# Patient Record
Sex: FEMALE | Race: WHITE | Employment: FULL TIME | URBAN - METROPOLITAN AREA
[De-identification: names, ages, dates, MRNs, and addresses within clinical notes are randomized per-mention and may not be internally consistent; named-entity substitution may affect disease eponyms.]

---

## 2017-01-18 ENCOUNTER — OFFICE VISIT (OUTPATIENT)
Dept: FAMILY MEDICINE CLINIC | Age: 62
End: 2017-01-18

## 2017-01-18 VITALS
BODY MASS INDEX: 34.49 KG/M2 | WEIGHT: 202 LBS | DIASTOLIC BLOOD PRESSURE: 72 MMHG | SYSTOLIC BLOOD PRESSURE: 152 MMHG | HEIGHT: 64 IN

## 2017-01-18 DIAGNOSIS — N18.9 CRI (CHRONIC RENAL INSUFFICIENCY), UNSPECIFIED STAGE: ICD-10-CM

## 2017-01-18 DIAGNOSIS — E78.5 HYPERLIPIDEMIA, UNSPECIFIED HYPERLIPIDEMIA TYPE: ICD-10-CM

## 2017-01-18 DIAGNOSIS — E11.21 TYPE 2 DIABETES MELLITUS WITH DIABETIC NEPHROPATHY, WITH LONG-TERM CURRENT USE OF INSULIN (HCC): ICD-10-CM

## 2017-01-18 DIAGNOSIS — I10 ESSENTIAL HYPERTENSION: Primary | ICD-10-CM

## 2017-01-18 DIAGNOSIS — Z79.4 TYPE 2 DIABETES MELLITUS WITH DIABETIC NEPHROPATHY, WITH LONG-TERM CURRENT USE OF INSULIN (HCC): ICD-10-CM

## 2017-01-18 PROCEDURE — 99214 OFFICE O/P EST MOD 30 MIN: CPT | Performed by: FAMILY MEDICINE

## 2017-01-18 RX ORDER — CARVEDILOL 12.5 MG/1
12.5 TABLET ORAL 2 TIMES DAILY
Qty: 180 TABLET | Refills: 1 | Status: SHIPPED | OUTPATIENT
Start: 2017-01-18 | End: 2017-07-24 | Stop reason: SDUPTHER

## 2017-01-19 RX ORDER — HYDROCHLOROTHIAZIDE 12.5 MG/1
CAPSULE, GELATIN COATED ORAL
Qty: 180 CAPSULE | Refills: 0 | Status: SHIPPED | OUTPATIENT
Start: 2017-01-19 | End: 2017-06-12 | Stop reason: SDUPTHER

## 2017-03-06 ENCOUNTER — OFFICE VISIT (OUTPATIENT)
Dept: FAMILY MEDICINE CLINIC | Age: 62
End: 2017-03-06

## 2017-03-06 VITALS
DIASTOLIC BLOOD PRESSURE: 64 MMHG | HEIGHT: 63 IN | BODY MASS INDEX: 37.39 KG/M2 | SYSTOLIC BLOOD PRESSURE: 160 MMHG | WEIGHT: 211 LBS

## 2017-03-06 DIAGNOSIS — E78.5 HYPERLIPIDEMIA, UNSPECIFIED HYPERLIPIDEMIA TYPE: ICD-10-CM

## 2017-03-06 DIAGNOSIS — E11.21 TYPE 2 DIABETES MELLITUS WITH DIABETIC NEPHROPATHY, WITH LONG-TERM CURRENT USE OF INSULIN (HCC): Primary | ICD-10-CM

## 2017-03-06 DIAGNOSIS — N18.9 CRI (CHRONIC RENAL INSUFFICIENCY), UNSPECIFIED STAGE: ICD-10-CM

## 2017-03-06 DIAGNOSIS — Z79.4 TYPE 2 DIABETES MELLITUS WITH DIABETIC NEPHROPATHY, WITH LONG-TERM CURRENT USE OF INSULIN (HCC): Primary | ICD-10-CM

## 2017-03-06 DIAGNOSIS — I10 ESSENTIAL HYPERTENSION: ICD-10-CM

## 2017-03-06 LAB
ANION GAP SERPL CALCULATED.3IONS-SCNC: 12 MMOL/L (ref 3–16)
BUN BLDV-MCNC: 24 MG/DL (ref 7–20)
CALCIUM SERPL-MCNC: 9 MG/DL (ref 8.3–10.6)
CHLORIDE BLD-SCNC: 105 MMOL/L (ref 99–110)
CHOLESTEROL, TOTAL: 125 MG/DL (ref 0–199)
CO2: 27 MMOL/L (ref 21–32)
CREAT SERPL-MCNC: 1.1 MG/DL (ref 0.6–1.2)
GFR AFRICAN AMERICAN: >60
GFR NON-AFRICAN AMERICAN: 50
GLUCOSE BLD-MCNC: 104 MG/DL (ref 70–99)
HDLC SERPL-MCNC: 53 MG/DL (ref 40–60)
LDL CHOLESTEROL CALCULATED: 50 MG/DL
POTASSIUM SERPL-SCNC: 4.9 MMOL/L (ref 3.5–5.1)
SODIUM BLD-SCNC: 144 MMOL/L (ref 136–145)
TRIGL SERPL-MCNC: 112 MG/DL (ref 0–150)
VLDLC SERPL CALC-MCNC: 22 MG/DL

## 2017-03-06 PROCEDURE — 36415 COLL VENOUS BLD VENIPUNCTURE: CPT | Performed by: FAMILY MEDICINE

## 2017-03-06 PROCEDURE — 99214 OFFICE O/P EST MOD 30 MIN: CPT | Performed by: FAMILY MEDICINE

## 2017-03-06 RX ORDER — VALSARTAN 160 MG/1
160 TABLET ORAL DAILY
COMMUNITY
End: 2017-08-18 | Stop reason: SDUPTHER

## 2017-03-06 RX ORDER — VALSARTAN 80 MG/1
80 TABLET ORAL DAILY
COMMUNITY
End: 2017-03-06

## 2017-03-07 LAB
ESTIMATED AVERAGE GLUCOSE: 128.4 MG/DL
HBA1C MFR BLD: 6.1 %

## 2017-04-24 RX ORDER — HYDROCHLOROTHIAZIDE 12.5 MG/1
CAPSULE, GELATIN COATED ORAL
Qty: 180 CAPSULE | Refills: 0 | Status: SHIPPED | OUTPATIENT
Start: 2017-04-24 | End: 2017-06-12 | Stop reason: SDUPTHER

## 2017-06-12 ENCOUNTER — OFFICE VISIT (OUTPATIENT)
Dept: FAMILY MEDICINE CLINIC | Age: 62
End: 2017-06-12

## 2017-06-12 VITALS
BODY MASS INDEX: 36.71 KG/M2 | DIASTOLIC BLOOD PRESSURE: 66 MMHG | HEIGHT: 63 IN | OXYGEN SATURATION: 98 % | HEART RATE: 66 BPM | SYSTOLIC BLOOD PRESSURE: 154 MMHG | WEIGHT: 207.2 LBS

## 2017-06-12 DIAGNOSIS — E11.21 TYPE 2 DIABETES MELLITUS WITH DIABETIC NEPHROPATHY, WITH LONG-TERM CURRENT USE OF INSULIN (HCC): Primary | ICD-10-CM

## 2017-06-12 DIAGNOSIS — E78.5 HYPERLIPIDEMIA, UNSPECIFIED HYPERLIPIDEMIA TYPE: ICD-10-CM

## 2017-06-12 DIAGNOSIS — Z79.4 TYPE 2 DIABETES MELLITUS WITH DIABETIC NEPHROPATHY, WITH LONG-TERM CURRENT USE OF INSULIN (HCC): Primary | ICD-10-CM

## 2017-06-12 DIAGNOSIS — N18.9 CRI (CHRONIC RENAL INSUFFICIENCY), UNSPECIFIED STAGE: ICD-10-CM

## 2017-06-12 DIAGNOSIS — I10 ESSENTIAL HYPERTENSION: ICD-10-CM

## 2017-06-12 LAB
CREATININE URINE: 152.2 MG/DL (ref 28–259)
HBA1C MFR BLD: 6.5 %
MICROALBUMIN UR-MCNC: 3 MG/DL
MICROALBUMIN/CREAT UR-RTO: 19.7 MG/G (ref 0–30)

## 2017-06-12 PROCEDURE — 99214 OFFICE O/P EST MOD 30 MIN: CPT | Performed by: FAMILY MEDICINE

## 2017-06-12 PROCEDURE — 83036 HEMOGLOBIN GLYCOSYLATED A1C: CPT | Performed by: FAMILY MEDICINE

## 2017-06-26 RX ORDER — PIOGLITAZONEHYDROCHLORIDE 45 MG/1
TABLET ORAL
Qty: 30 TABLET | Refills: 5 | OUTPATIENT
Start: 2017-06-26

## 2017-06-28 RX ORDER — PIOGLITAZONEHYDROCHLORIDE 45 MG/1
45 TABLET ORAL DAILY
Qty: 30 TABLET | Refills: 3 | Status: CANCELLED | OUTPATIENT
Start: 2017-06-28

## 2017-07-24 RX ORDER — CARVEDILOL 12.5 MG/1
12.5 TABLET ORAL 2 TIMES DAILY
Qty: 180 TABLET | Refills: 1 | Status: SHIPPED | OUTPATIENT
Start: 2017-07-24 | End: 2017-09-12 | Stop reason: SDUPTHER

## 2017-07-24 RX ORDER — HYDROCHLOROTHIAZIDE 12.5 MG/1
CAPSULE, GELATIN COATED ORAL
Qty: 180 CAPSULE | Refills: 0 | Status: SHIPPED | OUTPATIENT
Start: 2017-07-24 | End: 2017-09-12 | Stop reason: DRUGHIGH

## 2017-08-18 ENCOUNTER — OFFICE VISIT (OUTPATIENT)
Dept: CARDIOLOGY CLINIC | Age: 62
End: 2017-08-18

## 2017-08-18 VITALS
BODY MASS INDEX: 35.08 KG/M2 | HEART RATE: 64 BPM | DIASTOLIC BLOOD PRESSURE: 90 MMHG | WEIGHT: 198 LBS | SYSTOLIC BLOOD PRESSURE: 180 MMHG | OXYGEN SATURATION: 96 % | HEIGHT: 63 IN

## 2017-08-18 DIAGNOSIS — Z79.4 TYPE 2 DIABETES MELLITUS WITH DIABETIC NEPHROPATHY, WITH LONG-TERM CURRENT USE OF INSULIN (HCC): ICD-10-CM

## 2017-08-18 DIAGNOSIS — I10 ESSENTIAL HYPERTENSION: Primary | ICD-10-CM

## 2017-08-18 DIAGNOSIS — E11.21 TYPE 2 DIABETES MELLITUS WITH DIABETIC NEPHROPATHY, WITH LONG-TERM CURRENT USE OF INSULIN (HCC): ICD-10-CM

## 2017-08-18 DIAGNOSIS — E78.2 MIXED HYPERLIPIDEMIA: ICD-10-CM

## 2017-08-18 DIAGNOSIS — N18.9 CRI (CHRONIC RENAL INSUFFICIENCY), UNSPECIFIED STAGE: ICD-10-CM

## 2017-08-18 PROCEDURE — 93000 ELECTROCARDIOGRAM COMPLETE: CPT | Performed by: INTERNAL MEDICINE

## 2017-08-18 PROCEDURE — 99204 OFFICE O/P NEW MOD 45 MIN: CPT | Performed by: INTERNAL MEDICINE

## 2017-08-18 RX ORDER — HYDROCHLOROTHIAZIDE 25 MG/1
25 TABLET ORAL DAILY
Qty: 30 TABLET | Refills: 5 | Status: SHIPPED | OUTPATIENT
Start: 2017-08-18 | End: 2018-02-08 | Stop reason: SDUPTHER

## 2017-08-18 RX ORDER — VALSARTAN 320 MG/1
320 TABLET ORAL DAILY
Qty: 30 TABLET | Refills: 11 | Status: SHIPPED | OUTPATIENT
Start: 2017-08-18 | End: 2018-08-07 | Stop reason: ALTCHOICE

## 2017-08-18 RX ORDER — AMLODIPINE BESYLATE 5 MG/1
5 TABLET ORAL DAILY
Qty: 30 TABLET | Refills: 5 | Status: SHIPPED | OUTPATIENT
Start: 2017-08-18 | End: 2018-02-08 | Stop reason: SDUPTHER

## 2017-09-06 ENCOUNTER — TELEPHONE (OUTPATIENT)
Dept: CARDIOLOGY CLINIC | Age: 62
End: 2017-09-06

## 2017-09-07 ENCOUNTER — HOSPITAL ENCOUNTER (OUTPATIENT)
Dept: CARDIOLOGY | Facility: CLINIC | Age: 62
Discharge: HOME | End: 2017-09-07
Attending: INTERNAL MEDICINE | Admitting: INTERNAL MEDICINE

## 2017-09-07 LAB
LV EF: 55 %
LVEF MODALITY: NORMAL

## 2017-09-08 ENCOUNTER — TELEPHONE (OUTPATIENT)
Dept: CARDIOLOGY CLINIC | Age: 62
End: 2017-09-08

## 2017-09-12 ENCOUNTER — OFFICE VISIT (OUTPATIENT)
Dept: CARDIOLOGY CLINIC | Age: 62
End: 2017-09-12

## 2017-09-12 ENCOUNTER — HOSPITAL ENCOUNTER (OUTPATIENT)
Dept: VASCULAR LAB | Age: 62
Discharge: HOME | End: 2017-09-12
Attending: INTERNAL MEDICINE | Admitting: INTERNAL MEDICINE

## 2017-09-12 ENCOUNTER — TELEPHONE (OUTPATIENT)
Dept: CARDIOLOGY CLINIC | Age: 62
End: 2017-09-12

## 2017-09-12 VITALS
OXYGEN SATURATION: 96 % | SYSTOLIC BLOOD PRESSURE: 128 MMHG | WEIGHT: 197.5 LBS | HEIGHT: 62 IN | BODY MASS INDEX: 36.34 KG/M2 | HEART RATE: 72 BPM | DIASTOLIC BLOOD PRESSURE: 64 MMHG

## 2017-09-12 DIAGNOSIS — I10 ESSENTIAL (PRIMARY) HYPERTENSION: ICD-10-CM

## 2017-09-12 DIAGNOSIS — N18.9 CRI (CHRONIC RENAL INSUFFICIENCY), UNSPECIFIED STAGE: ICD-10-CM

## 2017-09-12 DIAGNOSIS — Z79.4 TYPE 2 DIABETES MELLITUS WITH DIABETIC NEPHROPATHY, WITH LONG-TERM CURRENT USE OF INSULIN (HCC): ICD-10-CM

## 2017-09-12 DIAGNOSIS — E78.2 MIXED HYPERLIPIDEMIA: ICD-10-CM

## 2017-09-12 DIAGNOSIS — I10 ESSENTIAL HYPERTENSION: Primary | ICD-10-CM

## 2017-09-12 DIAGNOSIS — E11.21 TYPE 2 DIABETES MELLITUS WITH DIABETIC NEPHROPATHY, WITH LONG-TERM CURRENT USE OF INSULIN (HCC): ICD-10-CM

## 2017-09-12 PROCEDURE — 99214 OFFICE O/P EST MOD 30 MIN: CPT | Performed by: NURSE PRACTITIONER

## 2017-09-12 RX ORDER — CARVEDILOL 25 MG/1
25 TABLET ORAL 2 TIMES DAILY
Qty: 60 TABLET | Refills: 5 | Status: SHIPPED | OUTPATIENT
Start: 2017-09-12 | End: 2018-02-08 | Stop reason: SDUPTHER

## 2017-09-13 ENCOUNTER — OFFICE VISIT (OUTPATIENT)
Dept: FAMILY MEDICINE CLINIC | Age: 62
End: 2017-09-13

## 2017-09-13 VITALS
DIASTOLIC BLOOD PRESSURE: 64 MMHG | OXYGEN SATURATION: 97 % | SYSTOLIC BLOOD PRESSURE: 148 MMHG | HEIGHT: 62 IN | HEART RATE: 83 BPM | BODY MASS INDEX: 36.25 KG/M2 | WEIGHT: 197 LBS

## 2017-09-13 DIAGNOSIS — Z79.4 TYPE 2 DIABETES MELLITUS WITH DIABETIC NEPHROPATHY, WITH LONG-TERM CURRENT USE OF INSULIN (HCC): Primary | ICD-10-CM

## 2017-09-13 DIAGNOSIS — E78.2 MIXED HYPERLIPIDEMIA: ICD-10-CM

## 2017-09-13 DIAGNOSIS — I10 ESSENTIAL HYPERTENSION: ICD-10-CM

## 2017-09-13 DIAGNOSIS — E11.21 TYPE 2 DIABETES MELLITUS WITH DIABETIC NEPHROPATHY, WITH LONG-TERM CURRENT USE OF INSULIN (HCC): Primary | ICD-10-CM

## 2017-09-13 DIAGNOSIS — N18.9 CRI (CHRONIC RENAL INSUFFICIENCY), UNSPECIFIED STAGE: ICD-10-CM

## 2017-09-13 DIAGNOSIS — Z23 FLU VACCINE NEED: ICD-10-CM

## 2017-09-13 LAB
A/G RATIO: 1.7 (ref 1.1–2.2)
ALBUMIN SERPL-MCNC: 4.2 G/DL (ref 3.4–5)
ALP BLD-CCNC: 56 U/L (ref 40–129)
ALT SERPL-CCNC: 28 U/L (ref 10–40)
ANION GAP SERPL CALCULATED.3IONS-SCNC: 14 MMOL/L (ref 3–16)
AST SERPL-CCNC: 26 U/L (ref 15–37)
BILIRUB SERPL-MCNC: <0.2 MG/DL (ref 0–1)
BUN BLDV-MCNC: 24 MG/DL (ref 7–20)
CALCIUM SERPL-MCNC: 9.3 MG/DL (ref 8.3–10.6)
CHLORIDE BLD-SCNC: 102 MMOL/L (ref 99–110)
CHOLESTEROL, TOTAL: 138 MG/DL (ref 0–199)
CO2: 27 MMOL/L (ref 21–32)
CREAT SERPL-MCNC: 1 MG/DL (ref 0.6–1.2)
GFR AFRICAN AMERICAN: >60
GFR NON-AFRICAN AMERICAN: 56
GLOBULIN: 2.5 G/DL
GLUCOSE BLD-MCNC: 158 MG/DL (ref 70–99)
HDLC SERPL-MCNC: 54 MG/DL (ref 40–60)
LDL CHOLESTEROL CALCULATED: 57 MG/DL
POTASSIUM SERPL-SCNC: 5.1 MMOL/L (ref 3.5–5.1)
SODIUM BLD-SCNC: 143 MMOL/L (ref 136–145)
TOTAL PROTEIN: 6.7 G/DL (ref 6.4–8.2)
TRIGL SERPL-MCNC: 136 MG/DL (ref 0–150)
VLDLC SERPL CALC-MCNC: 27 MG/DL

## 2017-09-13 PROCEDURE — 90686 IIV4 VACC NO PRSV 0.5 ML IM: CPT | Performed by: FAMILY MEDICINE

## 2017-09-13 PROCEDURE — 36415 COLL VENOUS BLD VENIPUNCTURE: CPT | Performed by: FAMILY MEDICINE

## 2017-09-13 PROCEDURE — 99214 OFFICE O/P EST MOD 30 MIN: CPT | Performed by: FAMILY MEDICINE

## 2017-09-13 PROCEDURE — 90471 IMMUNIZATION ADMIN: CPT | Performed by: FAMILY MEDICINE

## 2017-09-13 RX ORDER — COVID-19 ANTIGEN TEST
440 KIT MISCELLANEOUS DAILY
COMMUNITY
End: 2019-07-24

## 2017-09-13 ASSESSMENT — PATIENT HEALTH QUESTIONNAIRE - PHQ9
1. LITTLE INTEREST OR PLEASURE IN DOING THINGS: 0
2. FEELING DOWN, DEPRESSED OR HOPELESS: 0
SUM OF ALL RESPONSES TO PHQ QUESTIONS 1-9: 0
SUM OF ALL RESPONSES TO PHQ9 QUESTIONS 1 & 2: 0

## 2017-09-15 ENCOUNTER — TELEPHONE (OUTPATIENT)
Dept: FAMILY MEDICINE CLINIC | Age: 62
End: 2017-09-15

## 2017-10-24 ENCOUNTER — OFFICE VISIT (OUTPATIENT)
Dept: CARDIOLOGY CLINIC | Age: 62
End: 2017-10-24

## 2017-10-24 VITALS
HEIGHT: 62 IN | BODY MASS INDEX: 37.26 KG/M2 | DIASTOLIC BLOOD PRESSURE: 56 MMHG | HEART RATE: 70 BPM | OXYGEN SATURATION: 96 % | WEIGHT: 202.5 LBS | SYSTOLIC BLOOD PRESSURE: 138 MMHG

## 2017-10-24 DIAGNOSIS — E11.21 TYPE 2 DIABETES MELLITUS WITH DIABETIC NEPHROPATHY, WITH LONG-TERM CURRENT USE OF INSULIN (HCC): ICD-10-CM

## 2017-10-24 DIAGNOSIS — Z79.4 TYPE 2 DIABETES MELLITUS WITH DIABETIC NEPHROPATHY, WITH LONG-TERM CURRENT USE OF INSULIN (HCC): ICD-10-CM

## 2017-10-24 DIAGNOSIS — N18.9 CRI (CHRONIC RENAL INSUFFICIENCY), UNSPECIFIED STAGE: ICD-10-CM

## 2017-10-24 DIAGNOSIS — E78.2 MIXED HYPERLIPIDEMIA: ICD-10-CM

## 2017-10-24 DIAGNOSIS — I10 ESSENTIAL HYPERTENSION: Primary | ICD-10-CM

## 2017-10-24 PROCEDURE — 99213 OFFICE O/P EST LOW 20 MIN: CPT | Performed by: NURSE PRACTITIONER

## 2017-10-24 NOTE — PROGRESS NOTES
[facility]   Cardiac Evaluation    Primary Care Doctor: Ricky Romano MD    Chief Complaint   Patient presents with    Hypertension     follow up        History of Present Illness:   I had the pleasure of seeing Jenn Feng in follow up for HTN, HLD and DM. At last visit we increased the Coreg to 25 mg bid. She has tolerated this well. She had blood work last month with stable renal function. Her BP readings at home range 127/60 to 167/ 80, mostly in the 140/70's. She continues to exercise regularly and run around with grandkids. Her exercise tolerance is unchanged, good. She follows a no added salt diet. Jenn Feng describes symptoms including edema but denies chest pain, dyspnea, palpitations, orthopnea, PND, fatigue, early saiety, syncope. NYHA:   II  ACC/ AHA Stage:    B    Past Medical History:   has a past medical history of Asthma; Chronic kidney disease; Hyperlipidemia; Hypertension; and Type II or unspecified type diabetes mellitus without mention of complication, not stated as uncontrolled. Surgical History:   has a past surgical history that includes Spine surgery (1995) and Hysterectomy. Social History:   reports that she has never smoked. She has never used smokeless tobacco. She reports that she does not drink alcohol or use drugs. Family History:   Family History   Problem Relation Age of Onset    Diabetes Father        Home Medications:  Prior to Admission medications    Medication Sig Start Date End Date Taking?  Authorizing Provider   Naproxen Sodium (ALEVE) 220 MG CAPS Take 440 mg by mouth daily   Yes Historical Provider, MD   carvedilol (COREG) 25 MG tablet Take 1 tablet by mouth 2 times daily 9/12/17  Yes Lena Flavors, NP   CINNAMON PO Take by mouth   Yes Historical Provider, MD   valsartan (DIOVAN) 320 MG tablet Take 1 tablet by mouth daily 8/18/17  Yes Carlos Manuel Herrera MD   hydrochlorothiazide (HYDRODIURIL) 25 MG tablet Take 1 tablet by mouth daily 8/18/17  Yes Jamal Ormond, MD   amLODIPine (NORVASC) 5 MG tablet Take 1 tablet by mouth daily 8/18/17  Yes Jamal Ormond, MD   simvastatin (ZOCOR) 40 MG tablet TAKE 1 TABLET BY MOUTH EVERY EVENING 5/24/17  Yes Brendan Willett MD   metFORMIN (GLUCOPHAGE) 1000 MG tablet TAKE 1 TABLET BY MOUTH TWICE DAILY WITH MEALS 11/17/16  Yes Brendan Willett MD   Insulin Degludec (TRESIBA FLEXTOUCH) 200 UNIT/ML SOPN Inject 10 Units into the skin daily  Patient taking differently: Inject 16 Units into the skin daily  11/17/16  Yes Brendan Willett MD        Allergies:  Norvasc [amlodipine besylate]; Proventil [albuterol sulfate]; and Tetanus toxoids     Review of Systems:   · Constitutional: there has been no unanticipated weight loss. There's been no change in energy level, sleep pattern, or activity level. · Eyes: No visual changes or diplopia. No scleral icterus. · ENT: No Headaches, hearing loss or vertigo. No mouth sores or sore throat. · Cardiovascular: Reviewed in HPI  · Respiratory: No cough or wheezing, no sputum production. No hematemesis. · Gastrointestinal: No abdominal pain, appetite loss, blood in stools. No change in bowel or bladder habits. · Genitourinary: No dysuria, trouble voiding, or hematuria. · Musculoskeletal:  No gait disturbance, weakness or joint complaints. · Integumentary: No rash or pruritis. · Neurological: No headache, diplopia, change in muscle strength, numbness or tingling. No change in gait, balance, coordination, mood, affect, memory, mentation, behavior. · Psychiatric: No anxiety, no depression. · Endocrine: No malaise, fatigue or temperature intolerance. No excessive thirst, fluid intake, or urination. No tremor. · Hematologic/Lymphatic: No abnormal bruising or bleeding, blood clots or swollen lymph nodes. · Allergic/Immunologic: No nasal congestion or hives.     Physical Examination:    Vitals:    10/24/17 1532 10/24/17 1536   BP: (!) 152/60 (!) 138/56   Pulse: 70 SpO2: 96%    Weight: 202 lb 8 oz (91.9 kg)    Height: 5' 2\" (1.575 m)         Constitutional and General Appearance: Warm and dry, no apparent distress, normal coloration  HEENT:  Normocephalic, atraumatic  Respiratory:  · Normal excursion and expansion without use of accessory muscles  · Resp Auscultation: Normal breath sounds without dullness  Cardiovascular:  · The apical impulses not displaced  · Heart tones are crisp and normal  · JVP 8 cm H2O  · Regular rate and rhythm, normal S1S2, no m/g/r/c  · Peripheral pulses are symmetrical and full  · There is no clubbing, cyanosis of the extremities. · 1+ edema  · Pedal Pulses: 2+ and equal   Abdomen:  · No masses or tenderness  · Liver/Spleen: No Abnormalities Noted  Neurological/Psychiatric:  · Alert and oriented in all spheres  · Moves all extremities well  · Exhibits normal gait balance and coordination  · No abnormalities of mood, affect, memory, mentation, or behavior are noted    Lab Data:    CBC: No results found for: WBC, RBC, HGB, HCT, MCV, RDW, PLT  BMP:  Lab Results   Component Value Date     09/13/2017     03/06/2017     12/02/2016    K 5.1 09/13/2017    K 4.9 03/06/2017    K 4.6 12/02/2016     09/13/2017     03/06/2017     12/02/2016    CO2 27 09/13/2017    CO2 27 03/06/2017    CO2 28 12/02/2016    BUN 24 09/13/2017    BUN 24 03/06/2017    BUN 25 12/02/2016    CREATININE 1.0 09/13/2017    CREATININE 1.1 03/06/2017    CREATININE 1.1 12/02/2016     BNP: No results found for: PROBNP     Cardiac Imaging:  Echo 9/7/17:    Normal left ventricle systolic function with an estimated ejection fraction of 55%.   No regional wall motion abnormalities are seen.   Normal left ventricular diastolic filling pressure.   No significant valvular heart disease.   Systolic pulmonary artery pressure (SPAP) is normal and estimated at 24 mmHg (RA pressure 3 mmHg).     Renal artery ultrasound 9/12/17:    1. Technically limited exam due to

## 2017-11-21 NOTE — TELEPHONE ENCOUNTER
Robert Jacobsen is requesting refill(s)   Last OV 9-13-17 (pertaining to medication)  LR 11-17-16 (per medication requested)  Next office visit scheduled or attempted Yes   If no, reason:  12-20-17

## 2017-12-20 ENCOUNTER — OFFICE VISIT (OUTPATIENT)
Dept: FAMILY MEDICINE CLINIC | Age: 62
End: 2017-12-20

## 2017-12-20 VITALS
BODY MASS INDEX: 36.25 KG/M2 | DIASTOLIC BLOOD PRESSURE: 78 MMHG | WEIGHT: 197 LBS | SYSTOLIC BLOOD PRESSURE: 142 MMHG | HEIGHT: 62 IN

## 2017-12-20 DIAGNOSIS — E78.2 MIXED HYPERLIPIDEMIA: ICD-10-CM

## 2017-12-20 DIAGNOSIS — I10 ESSENTIAL HYPERTENSION: ICD-10-CM

## 2017-12-20 DIAGNOSIS — N18.9 CHRONIC RENAL IMPAIRMENT, UNSPECIFIED CKD STAGE: ICD-10-CM

## 2017-12-20 DIAGNOSIS — Z12.11 SCREEN FOR COLON CANCER: ICD-10-CM

## 2017-12-20 DIAGNOSIS — E11.21 TYPE 2 DIABETES MELLITUS WITH DIABETIC NEPHROPATHY, WITH LONG-TERM CURRENT USE OF INSULIN (HCC): Primary | ICD-10-CM

## 2017-12-20 DIAGNOSIS — Z79.4 TYPE 2 DIABETES MELLITUS WITH DIABETIC NEPHROPATHY, WITH LONG-TERM CURRENT USE OF INSULIN (HCC): Primary | ICD-10-CM

## 2017-12-20 LAB
ANION GAP SERPL CALCULATED.3IONS-SCNC: 14 MMOL/L (ref 3–16)
BUN BLDV-MCNC: 29 MG/DL (ref 7–20)
CALCIUM SERPL-MCNC: 9.6 MG/DL (ref 8.3–10.6)
CHLORIDE BLD-SCNC: 101 MMOL/L (ref 99–110)
CO2: 27 MMOL/L (ref 21–32)
CREAT SERPL-MCNC: 1 MG/DL (ref 0.6–1.2)
GFR AFRICAN AMERICAN: >60
GFR NON-AFRICAN AMERICAN: 56
GLUCOSE BLD-MCNC: 167 MG/DL (ref 70–99)
HBA1C MFR BLD: 7.8 %
POTASSIUM SERPL-SCNC: 5.1 MMOL/L (ref 3.5–5.1)
SODIUM BLD-SCNC: 142 MMOL/L (ref 136–145)

## 2017-12-20 PROCEDURE — 83036 HEMOGLOBIN GLYCOSYLATED A1C: CPT | Performed by: FAMILY MEDICINE

## 2017-12-20 PROCEDURE — 99214 OFFICE O/P EST MOD 30 MIN: CPT | Performed by: FAMILY MEDICINE

## 2017-12-20 PROCEDURE — 36415 COLL VENOUS BLD VENIPUNCTURE: CPT | Performed by: FAMILY MEDICINE

## 2017-12-20 NOTE — PROGRESS NOTES
BP Readings from Last 2 Encounters:   10/24/17 (!) 138/56   09/13/17 (!) 148/64     Hemoglobin A1C (%)   Date Value   06/12/2017 6.5     Microalbumin, Random Urine (mg/dL)   Date Value   06/12/2017 3.00 (H)     LDL Calculated (mg/dL)   Date Value   09/13/2017 57              Tobacco use:  Patient  reports that she has never smoked. She has never used smokeless tobacco.    If Smoker - Cessation materials given? NA    Is Daily aspirin on medication list?:  No    Diabetic retinal exam done? Yes   If yes, document in Health Maintenance. Monofilament placed on counter? Yes    Shoes and socks removed? Yes    BP taken with correct size cuff? Yes   Repeated if > 130/80 No     Microalbumin performed if applicable? Yes     SUBJECTIVE:  Arleth Dooley is a 58 y.o. female who presents for evaluation ofRenal insufficiency, hypertension, Diabetes Mellitus and hyperlipidemia. She indicates that she is feeling well and denies any symptoms referable to her elevated blood pressure, diabetes or hyperlipidemia. Specifically denies chest pain, , dyspnea, orthopnea, PND,peripheral edema , or neuro sx. No anorexia, , or leg cramps noted. No episodes of hypoglycemia. Current medication regimen is as listed below. She denies any side effects of medication, and has been taking it regularly. Since patient's last visit, she has seen cardiology, they did not change her medication that visit. We updated her medication list. Her blood pressures at home have been ranging 120s to 160s over 70s. At her last visit we also talked about increasing her insulin to use 19 units. She did not seem to understand this instruction. So again had a discussion with her about increasing her insulin to 19 units, waiting for 3 days and then increase it by one unit every 3 days until her fasting sugars are closer to 102 hour postprandial sugars are closer to 140. Blood Pressure:   Blood pressures are being checked at home.   Ranges have been : see log    Diabetes: The last A1C: 7.4. Diabetic complications are: renal insufficiency. The Blood sugars range at home have been: see log. Last Eye Exam : 11/17. The last microalbumin:    Lab Results   Component Value Date    LABMICR 3.00 (H) 06/12/2017         she is not taking aspirin. History   Smoking Status    Never Smoker   Smokeless Tobacco    Never Used       Current Outpatient Prescriptions   Medication Sig Dispense Refill    Naproxen Sodium (ALEVE) 220 MG CAPS Take 440 mg by mouth daily      carvedilol (COREG) 25 MG tablet Take 1 tablet by mouth 2 times daily 60 tablet 5    CINNAMON PO Take by mouth      valsartan (DIOVAN) 320 MG tablet Take 1 tablet by mouth daily 30 tablet 11    hydrochlorothiazide (HYDRODIURIL) 25 MG tablet Take 1 tablet by mouth daily 30 tablet 5    amLODIPine (NORVASC) 5 MG tablet Take 1 tablet by mouth daily 30 tablet 5    simvastatin (ZOCOR) 40 MG tablet TAKE 1 TABLET BY MOUTH EVERY EVENING 90 tablet 3    metFORMIN (GLUCOPHAGE) 1000 MG tablet TAKE 1 TABLET BY MOUTH TWICE DAILY WITH MEALS 180 tablet 1    Insulin Degludec (TRESIBA FLEXTOUCH) 200 UNIT/ML SOPN Inject 10 Units into the skin daily (Patient taking differently: Inject 16 Units into the skin daily ) 1 Pen 5    metFORMIN (GLUCOPHAGE) 1000 MG tablet TAKE 1 TABLET BY MOUTH TWICE DAILY WITH MEALS 180 tablet 0     No current facility-administered medications for this visit. OBJECTIVE:  BP (!) 142/78   Ht 5' 2\" (1.575 m)   Wt 197 lb (89.4 kg)   BMI 36.03 kg/m²   General: NAD, non-toxic  Neck: no JVD or bruits  S1 and S2 normal, no murmurs, clicks, gallops or rubs. Regular rate and rhythm. Chest is clear; no wheezes or rales. No edema or JVD. Vascular : DP 1-2+=  Neuro: alert, no CN or motor deficits, monofilament normal BL  Psych: normal mood, thought and judgement        ASSESSMENT:   Diabetes Mellitus, today's A1C is 7.8  1.  Type 2 diabetes mellitus with diabetic nephropathy, with long-term current use of insulin (HCC)  HM DIABETES FOOT EXAM, Needs improvement again. Reviewed with the patient increasing her insulin to 19 units, then every 3 days increase it by one unit until her fasting sugars are closer to 100 and her postprandial sugars are closer to 140. She states that she understands these instructions    2. Essential hypertension  Basic Metabolic Panel, Stable    3. Mixed hyperlipidemia  Well controlled    4. Chronic renal impairment, unspecified CKD stage  Labs pending    5. Screen for colon cancer  POCT Fecal Immunochemical Test (FIT)             Plan:  1)  Medication: continue current medication regimen As above  2)  Recheck in 3 months, sooner should new symptoms or problems arise. 3) LLR  Clemencia received counseling on the following healthy behaviors: nutrition, exercise and medication adherence        I have instructed Clemencia to complete a self tracking handout on Blood Sugars  and Blood Pressures  and instructed them to bring it with them to her next appointment. Discussed use, benefit, and side effects of prescribed medications. Barriers to medication compliance addressed. All patient questions answered. Pt voiced understanding.            Brisa Paniagua M.D.

## 2017-12-22 ENCOUNTER — HOSPITAL ENCOUNTER (OUTPATIENT)
Dept: MAMMOGRAPHY | Age: 62
Discharge: HOME | End: 2017-12-22
Attending: FAMILY MEDICINE | Admitting: FAMILY MEDICINE

## 2017-12-22 DIAGNOSIS — I10 ESSENTIAL (PRIMARY) HYPERTENSION: ICD-10-CM

## 2017-12-22 DIAGNOSIS — Z12.31 ENCOUNTER FOR SCREENING MAMMOGRAM FOR BREAST CANCER: ICD-10-CM

## 2017-12-28 LAB
CONTROL: NORMAL
HEMOCCULT STL QL: NEGATIVE

## 2017-12-28 PROCEDURE — 82274 ASSAY TEST FOR BLOOD FECAL: CPT | Performed by: FAMILY MEDICINE

## 2018-01-09 ENCOUNTER — TELEPHONE (OUTPATIENT)
Dept: FAMILY MEDICINE CLINIC | Age: 63
End: 2018-01-09

## 2018-02-08 ENCOUNTER — OFFICE VISIT (OUTPATIENT)
Dept: CARDIOLOGY CLINIC | Age: 63
End: 2018-02-08

## 2018-02-08 VITALS
OXYGEN SATURATION: 96 % | WEIGHT: 193.6 LBS | SYSTOLIC BLOOD PRESSURE: 144 MMHG | HEART RATE: 63 BPM | HEIGHT: 62 IN | DIASTOLIC BLOOD PRESSURE: 72 MMHG | BODY MASS INDEX: 35.63 KG/M2

## 2018-02-08 DIAGNOSIS — E78.2 MIXED HYPERLIPIDEMIA: ICD-10-CM

## 2018-02-08 DIAGNOSIS — N18.9 CRI (CHRONIC RENAL INSUFFICIENCY), UNSPECIFIED STAGE: ICD-10-CM

## 2018-02-08 DIAGNOSIS — I10 ESSENTIAL HYPERTENSION: Primary | ICD-10-CM

## 2018-02-08 PROCEDURE — 99213 OFFICE O/P EST LOW 20 MIN: CPT | Performed by: NURSE PRACTITIONER

## 2018-02-08 RX ORDER — CARVEDILOL 25 MG/1
25 TABLET ORAL 2 TIMES DAILY
Qty: 60 TABLET | Refills: 11 | Status: SHIPPED | OUTPATIENT
Start: 2018-02-08 | End: 2018-08-09 | Stop reason: SDUPTHER

## 2018-02-08 RX ORDER — AMLODIPINE BESYLATE 5 MG/1
5 TABLET ORAL DAILY
Qty: 30 TABLET | Refills: 11 | Status: SHIPPED | OUTPATIENT
Start: 2018-02-08 | End: 2018-10-02 | Stop reason: SDUPTHER

## 2018-02-08 RX ORDER — CALCIUM CARBONATE 300MG(750)
TABLET,CHEWABLE ORAL NIGHTLY
COMMUNITY

## 2018-02-08 RX ORDER — HYDROCHLOROTHIAZIDE 25 MG/1
25 TABLET ORAL DAILY
Qty: 30 TABLET | Refills: 11 | Status: SHIPPED | OUTPATIENT
Start: 2018-02-08 | End: 2018-10-02 | Stop reason: SDUPTHER

## 2018-02-08 RX ORDER — PHENOL 1.4 %
AEROSOL, SPRAY (ML) MUCOUS MEMBRANE DAILY
COMMUNITY

## 2018-02-08 RX ORDER — CETIRIZINE HYDROCHLORIDE 10 MG/1
10 TABLET ORAL DAILY
COMMUNITY

## 2018-02-08 NOTE — PROGRESS NOTES
[address]   Cardiac Evaluation    Primary Care Doctor: Josefa Lee MD    Chief Complaint   Patient presents with    Follow-up     no cardiac complaints        History of Present Illness:   I had the pleasure of seeing Noe Members in follow up for HTN, HLD as well as DM and CKD. Blood work from December 2017 reviewed and stable. She has been doing well. She has lost about 10 lbs. She is working fewer hours now. Appetite and sleep are good. Noe Members describes symptoms including none but denies chest pain, dyspnea, palpitations, orthopnea, PND, fatigue, early saiety, edema, syncope. NYHA:   I  ACC/ AHA Stage:    B    Past Medical History:   has a past medical history of Asthma; Chronic kidney disease; Hyperlipidemia; Hypertension; and Type II or unspecified type diabetes mellitus without mention of complication, not stated as uncontrolled. Surgical History:   has a past surgical history that includes Spine surgery (1995) and Hysterectomy. Social History:   reports that she has never smoked. She has never used smokeless tobacco. She reports that she does not drink alcohol or use drugs. Family History:   Family History   Problem Relation Age of Onset    Diabetes Father        Home Medications:  Prior to Admission medications    Medication Sig Start Date End Date Taking?  Authorizing Provider   Multiple Vitamins-Minerals (MULTIVITAMIN WOMEN) TABS Take by mouth daily   Yes Historical Provider, MD   Magnesium 400 MG TABS Take by mouth nightly   Yes Historical Provider, MD   cetirizine (ZYRTEC) 10 MG tablet Take 10 mg by mouth daily   Yes Historical Provider, MD SCHMITT FLEXTOUCH 200 UNIT/ML SOPN INJECT 10 UNITS INTO THE SKIN DAILY 1/22/18  Yes Josefa Lee MD   Naproxen Sodium (ALEVE) 220 MG CAPS Take 440 mg by mouth daily   Yes Historical Provider, MD   carvedilol (COREG) 25 MG tablet Take 1 tablet by mouth 2 times daily 9/12/17  Yes Dionte Bill NP   CINNAMON PO Take by mouth   Yes Historical Provider, MD   valsartan (DIOVAN) 320 MG tablet Take 1 tablet by mouth daily 8/18/17  Yes Tessy Acharya MD   hydrochlorothiazide (HYDRODIURIL) 25 MG tablet Take 1 tablet by mouth daily 8/18/17  Yes Tessy Acharya MD   amLODIPine (NORVASC) 5 MG tablet Take 1 tablet by mouth daily 8/18/17  Yes Tessy Acharya MD   simvastatin (ZOCOR) 40 MG tablet TAKE 1 TABLET BY MOUTH EVERY EVENING 5/24/17  Yes Norma Faith MD   metFORMIN (GLUCOPHAGE) 1000 MG tablet TAKE 1 TABLET BY MOUTH TWICE DAILY WITH MEALS 11/17/16  Yes Norma Faith MD        Allergies:  Norvasc [amlodipine besylate]; Proventil [albuterol sulfate]; and Tetanus toxoids     Review of Systems:   · Constitutional: there has been no unanticipated weight loss. There's been no change in energy level, sleep pattern, or activity level. · Eyes: No visual changes or diplopia. No scleral icterus. · ENT: No Headaches, hearing loss or vertigo. No mouth sores or sore throat. · Cardiovascular: Reviewed in HPI  · Respiratory: No cough or wheezing, no sputum production. No hematemesis. · Gastrointestinal: No abdominal pain, appetite loss, blood in stools. No change in bowel or bladder habits. · Genitourinary: No dysuria, trouble voiding, or hematuria. · Musculoskeletal:  No gait disturbance, weakness or joint complaints. · Integumentary: No rash or pruritis. · Neurological: No headache, diplopia, change in muscle strength, numbness or tingling. No change in gait, balance, coordination, mood, affect, memory, mentation, behavior. · Psychiatric: No anxiety, no depression. · Endocrine: No malaise, fatigue or temperature intolerance. No excessive thirst, fluid intake, or urination. No tremor. · Hematologic/Lymphatic: No abnormal bruising or bleeding, blood clots or swollen lymph nodes. · Allergic/Immunologic: No nasal congestion or hives.     Physical Examination:    Vitals:    02/08/18 6365 02/08/18 0820 02/08/18 0654 BP: (!) 162/70 (!) 160/70 (!) 144/72   Site:   Left Arm   Position:   Sitting   Cuff Size:   Medium Adult   Pulse: 63     SpO2: 96%     Weight: 193 lb 9.6 oz (87.8 kg)     Height: 5' 2\" (1.575 m)          Constitutional and General Appearance: Warm and dry, no apparent distress, normal coloration  HEENT:  Normocephalic, atraumatic  Respiratory:  · Normal excursion and expansion without use of accessory muscles  · Resp Auscultation: Normal breath sounds without dullness  Cardiovascular:  · The apical impulses not displaced  · Heart tones are crisp and normal  · JVP <8 cm H2O  · Regular rate and rhythm, normal S1S2, no m/g/r/c  · Peripheral pulses are symmetrical and full  · There is no clubbing, cyanosis of the extremities.   · No edema  · Pedal Pulses: 2+ and equal   Abdomen:  · No masses or tenderness  · Liver/Spleen: No Abnormalities Noted  Neurological/Psychiatric:  · Alert and oriented in all spheres  · Moves all extremities well  · Exhibits normal gait balance and coordination  · No abnormalities of mood, affect, memory, mentation, or behavior are noted    Lab Data:    CBC: No results found for: WBC, RBC, HGB, HCT, MCV, RDW, PLT  BMP:  Lab Results   Component Value Date     12/20/2017     09/13/2017     03/06/2017    K 5.1 12/20/2017    K 5.1 09/13/2017    K 4.9 03/06/2017     12/20/2017     09/13/2017     03/06/2017    CO2 27 12/20/2017    CO2 27 09/13/2017    CO2 27 03/06/2017    BUN 29 12/20/2017    BUN 24 09/13/2017    BUN 24 03/06/2017    CREATININE 1.0 12/20/2017    CREATININE 1.0 09/13/2017    CREATININE 1.1 03/06/2017     BNP: No results found for: PROBNP     Cardiac Imaging:  Echo 9/7/17:    Normal left ventricle systolic function with an estimated ejection fraction of 55%.   No regional wall motion abnormalities are seen.   Normal left ventricular diastolic filling pressure.   No significant valvular heart disease.   Systolic pulmonary artery pressure (SPAP) is

## 2018-03-21 ENCOUNTER — OFFICE VISIT (OUTPATIENT)
Dept: FAMILY MEDICINE CLINIC | Age: 63
End: 2018-03-21

## 2018-03-21 VITALS
DIASTOLIC BLOOD PRESSURE: 68 MMHG | HEART RATE: 65 BPM | WEIGHT: 196 LBS | BODY MASS INDEX: 36.07 KG/M2 | HEIGHT: 62 IN | SYSTOLIC BLOOD PRESSURE: 132 MMHG | OXYGEN SATURATION: 96 %

## 2018-03-21 DIAGNOSIS — Z79.4 TYPE 2 DIABETES MELLITUS WITH DIABETIC NEPHROPATHY, WITH LONG-TERM CURRENT USE OF INSULIN (HCC): Primary | ICD-10-CM

## 2018-03-21 DIAGNOSIS — N18.9 CHRONIC RENAL IMPAIRMENT, UNSPECIFIED CKD STAGE: ICD-10-CM

## 2018-03-21 DIAGNOSIS — E11.21 TYPE 2 DIABETES MELLITUS WITH DIABETIC NEPHROPATHY, WITH LONG-TERM CURRENT USE OF INSULIN (HCC): Primary | ICD-10-CM

## 2018-03-21 DIAGNOSIS — I10 ESSENTIAL HYPERTENSION: ICD-10-CM

## 2018-03-21 DIAGNOSIS — E78.2 MIXED HYPERLIPIDEMIA: ICD-10-CM

## 2018-03-21 LAB
A/G RATIO: 1.6 (ref 1.1–2.2)
ALBUMIN SERPL-MCNC: 4.1 G/DL (ref 3.4–5)
ALP BLD-CCNC: 66 U/L (ref 40–129)
ALT SERPL-CCNC: 70 U/L (ref 10–40)
ANION GAP SERPL CALCULATED.3IONS-SCNC: 11 MMOL/L (ref 3–16)
AST SERPL-CCNC: 53 U/L (ref 15–37)
BILIRUB SERPL-MCNC: 0.3 MG/DL (ref 0–1)
BUN BLDV-MCNC: 21 MG/DL (ref 7–20)
CALCIUM SERPL-MCNC: 9.2 MG/DL (ref 8.3–10.6)
CHLORIDE BLD-SCNC: 102 MMOL/L (ref 99–110)
CHOLESTEROL, TOTAL: 160 MG/DL (ref 0–199)
CO2: 29 MMOL/L (ref 21–32)
CREAT SERPL-MCNC: 0.9 MG/DL (ref 0.6–1.2)
GFR AFRICAN AMERICAN: >60
GFR NON-AFRICAN AMERICAN: >60
GLOBULIN: 2.5 G/DL
GLUCOSE BLD-MCNC: 197 MG/DL (ref 70–99)
HBA1C MFR BLD: 8 %
HDLC SERPL-MCNC: 59 MG/DL (ref 40–60)
LDL CHOLESTEROL CALCULATED: 55 MG/DL
POTASSIUM SERPL-SCNC: 5.2 MMOL/L (ref 3.5–5.1)
SODIUM BLD-SCNC: 142 MMOL/L (ref 136–145)
TOTAL PROTEIN: 6.6 G/DL (ref 6.4–8.2)
TRIGL SERPL-MCNC: 228 MG/DL (ref 0–150)
VLDLC SERPL CALC-MCNC: 46 MG/DL

## 2018-03-21 PROCEDURE — 99214 OFFICE O/P EST MOD 30 MIN: CPT | Performed by: FAMILY MEDICINE

## 2018-03-21 PROCEDURE — 36415 COLL VENOUS BLD VENIPUNCTURE: CPT | Performed by: FAMILY MEDICINE

## 2018-03-21 PROCEDURE — 83036 HEMOGLOBIN GLYCOSYLATED A1C: CPT | Performed by: FAMILY MEDICINE

## 2018-03-21 NOTE — PROGRESS NOTES
diabetic nephropathy, with long-term current use of insulin (HCC)  POCT glycosylated hemoglobin (Hb A1C)    dapagliflozin (FARXIGA) 5 MG tablet     DIABETES FOOT EXAM, Continue Tresiba. Hold off on increasing the dose until she gives the [place] time to show how much of a reduction she would get. We talked about possible side effects of [place] infections and urinary tract infections    2. Essential hypertension  Comprehensive Metabolic Panel, Stable    3. Chronic renal impairment, unspecified CKD stage  Labs pending    4. Mixed hyperlipidemia  Lipid Panel, Labs pending              Plan:  1)  Medication: continue current medication regimen unchanged  2)  Recheck in 3 months, sooner should new symptoms or problems arise. 3) LLR  Clemencia received counseling on the following healthy behaviors: medication adherence        I have instructed Clemencia to complete a self tracking handout on Blood Sugars  and Blood Pressures  and instructed them to bring it with them to her next appointment. Discussed use, benefit, and side effects of prescribed medications. Barriers to medication compliance addressed. All patient questions answered. Pt voiced understanding.            Jena Acosta M.D.

## 2018-06-27 ENCOUNTER — OFFICE VISIT (OUTPATIENT)
Dept: FAMILY MEDICINE CLINIC | Age: 63
End: 2018-06-27

## 2018-06-27 VITALS
DIASTOLIC BLOOD PRESSURE: 64 MMHG | WEIGHT: 190.2 LBS | HEIGHT: 62 IN | BODY MASS INDEX: 35 KG/M2 | OXYGEN SATURATION: 96 % | HEART RATE: 66 BPM | SYSTOLIC BLOOD PRESSURE: 130 MMHG

## 2018-06-27 DIAGNOSIS — I10 ESSENTIAL HYPERTENSION: ICD-10-CM

## 2018-06-27 DIAGNOSIS — Z79.4 TYPE 2 DIABETES MELLITUS WITH DIABETIC NEPHROPATHY, WITH LONG-TERM CURRENT USE OF INSULIN (HCC): Primary | ICD-10-CM

## 2018-06-27 DIAGNOSIS — E78.2 MIXED HYPERLIPIDEMIA: ICD-10-CM

## 2018-06-27 DIAGNOSIS — R22.1 LOCALIZED SWELLING, MASS AND LUMP, NECK: ICD-10-CM

## 2018-06-27 DIAGNOSIS — N18.9 CHRONIC RENAL IMPAIRMENT, UNSPECIFIED CKD STAGE: ICD-10-CM

## 2018-06-27 DIAGNOSIS — E11.21 TYPE 2 DIABETES MELLITUS WITH DIABETIC NEPHROPATHY, WITH LONG-TERM CURRENT USE OF INSULIN (HCC): Primary | ICD-10-CM

## 2018-06-27 LAB
A/G RATIO: 1.4 (ref 1.1–2.2)
ALBUMIN SERPL-MCNC: 3.8 G/DL (ref 3.4–5)
ALP BLD-CCNC: 66 U/L (ref 40–129)
ALT SERPL-CCNC: 86 U/L (ref 10–40)
ANION GAP SERPL CALCULATED.3IONS-SCNC: 13 MMOL/L (ref 3–16)
AST SERPL-CCNC: 54 U/L (ref 15–37)
BILIRUB SERPL-MCNC: <0.2 MG/DL (ref 0–1)
BUN BLDV-MCNC: 24 MG/DL (ref 7–20)
CALCIUM SERPL-MCNC: 9.2 MG/DL (ref 8.3–10.6)
CHLORIDE BLD-SCNC: 97 MMOL/L (ref 99–110)
CO2: 27 MMOL/L (ref 21–32)
CREAT SERPL-MCNC: 1.1 MG/DL (ref 0.6–1.2)
CREATININE URINE: 75.1 MG/DL (ref 28–259)
GFR AFRICAN AMERICAN: >60
GFR NON-AFRICAN AMERICAN: 50
GLOBULIN: 2.8 G/DL
GLUCOSE BLD-MCNC: 263 MG/DL (ref 70–99)
HBA1C MFR BLD: 9.3 %
MICROALBUMIN UR-MCNC: <1.2 MG/DL
MICROALBUMIN/CREAT UR-RTO: NORMAL MG/G (ref 0–30)
POTASSIUM SERPL-SCNC: 4.7 MMOL/L (ref 3.5–5.1)
SODIUM BLD-SCNC: 137 MMOL/L (ref 136–145)
TOTAL PROTEIN: 6.6 G/DL (ref 6.4–8.2)

## 2018-06-27 PROCEDURE — 99214 OFFICE O/P EST MOD 30 MIN: CPT | Performed by: FAMILY MEDICINE

## 2018-06-27 PROCEDURE — 36415 COLL VENOUS BLD VENIPUNCTURE: CPT | Performed by: FAMILY MEDICINE

## 2018-06-27 PROCEDURE — 83036 HEMOGLOBIN GLYCOSYLATED A1C: CPT | Performed by: FAMILY MEDICINE

## 2018-06-28 LAB
ESTIMATED AVERAGE GLUCOSE: 237.4 MG/DL
HBA1C MFR BLD: 9.9 %

## 2018-06-29 ENCOUNTER — HOSPITAL ENCOUNTER (OUTPATIENT)
Dept: OTHER | Age: 63
Discharge: HOME | End: 2018-06-30
Attending: FAMILY MEDICINE | Admitting: FAMILY MEDICINE

## 2018-06-29 ENCOUNTER — HOSPITAL ENCOUNTER (OUTPATIENT)
Dept: GENERAL RADIOLOGY | Age: 63
Discharge: HOME | End: 2018-06-29

## 2018-06-29 ENCOUNTER — OFFICE VISIT (OUTPATIENT)
Dept: FAMILY MEDICINE CLINIC | Age: 63
End: 2018-06-29

## 2018-06-29 VITALS
SYSTOLIC BLOOD PRESSURE: 144 MMHG | WEIGHT: 190 LBS | HEIGHT: 62 IN | BODY MASS INDEX: 34.96 KG/M2 | DIASTOLIC BLOOD PRESSURE: 68 MMHG

## 2018-06-29 DIAGNOSIS — E11.21 TYPE 2 DIABETES MELLITUS WITH DIABETIC NEPHROPATHY, WITH LONG-TERM CURRENT USE OF INSULIN (HCC): Primary | ICD-10-CM

## 2018-06-29 DIAGNOSIS — R22.1 LOCALIZED SWELLING, MASS AND LUMP, NECK: ICD-10-CM

## 2018-06-29 DIAGNOSIS — Z79.4 TYPE 2 DIABETES MELLITUS WITH DIABETIC NEPHROPATHY, WITH LONG-TERM CURRENT USE OF INSULIN (HCC): Primary | ICD-10-CM

## 2018-06-29 PROCEDURE — 99213 OFFICE O/P EST LOW 20 MIN: CPT | Performed by: FAMILY MEDICINE

## 2018-07-12 ENCOUNTER — TELEPHONE (OUTPATIENT)
Dept: FAMILY MEDICINE CLINIC | Age: 63
End: 2018-07-12

## 2018-08-07 RX ORDER — IRBESARTAN 300 MG/1
300 TABLET ORAL NIGHTLY
Qty: 90 TABLET | Refills: 2 | Status: SHIPPED | OUTPATIENT
Start: 2018-08-07 | End: 2018-08-09 | Stop reason: SDUPTHER

## 2018-08-09 ENCOUNTER — OFFICE VISIT (OUTPATIENT)
Dept: CARDIOLOGY CLINIC | Age: 63
End: 2018-08-09

## 2018-08-09 VITALS
OXYGEN SATURATION: 97 % | BODY MASS INDEX: 35.33 KG/M2 | WEIGHT: 192 LBS | HEART RATE: 62 BPM | DIASTOLIC BLOOD PRESSURE: 80 MMHG | HEIGHT: 62 IN | SYSTOLIC BLOOD PRESSURE: 160 MMHG

## 2018-08-09 DIAGNOSIS — E78.2 MIXED HYPERLIPIDEMIA: ICD-10-CM

## 2018-08-09 DIAGNOSIS — E11.21 TYPE 2 DIABETES MELLITUS WITH DIABETIC NEPHROPATHY, WITH LONG-TERM CURRENT USE OF INSULIN (HCC): ICD-10-CM

## 2018-08-09 DIAGNOSIS — Z79.4 TYPE 2 DIABETES MELLITUS WITH DIABETIC NEPHROPATHY, WITH LONG-TERM CURRENT USE OF INSULIN (HCC): ICD-10-CM

## 2018-08-09 DIAGNOSIS — I10 ESSENTIAL HYPERTENSION: Primary | ICD-10-CM

## 2018-08-09 DIAGNOSIS — N18.9 CRI (CHRONIC RENAL INSUFFICIENCY), UNSPECIFIED STAGE: ICD-10-CM

## 2018-08-09 PROCEDURE — 99213 OFFICE O/P EST LOW 20 MIN: CPT | Performed by: NURSE PRACTITIONER

## 2018-08-09 RX ORDER — CARVEDILOL 25 MG/1
25 TABLET ORAL 2 TIMES DAILY WITH MEALS
Qty: 60 TABLET | Refills: 11
Start: 2018-08-09 | End: 2019-02-13 | Stop reason: SDUPTHER

## 2018-08-09 RX ORDER — IRBESARTAN 300 MG/1
300 TABLET ORAL EVERY EVENING
Qty: 90 TABLET | Refills: 2
Start: 2018-08-09 | End: 2020-01-28 | Stop reason: ALTCHOICE

## 2018-08-09 NOTE — PROGRESS NOTES
[address]   Cardiac Evaluation    Primary Care Doctor: Any Telles MD    Chief Complaint   Patient presents with    Hyperlipidemia    Hypertension        History of Present Illness:   I had the pleasure of seeing Mikey Cali in follow up for HTN, HLD as well as DM and CKD. She was started on short acting insulin for coverage, A1c was in 9 range. Her BP at home ranges 603-554 systolic however review of her log shows 709-497 systolic in AM, 224-471 in PM.  She is finishing out her valsartan and will  the irbesartan today. She denies any problems recently. She is staying active with working, grand kids and riding bicycle. She did not take the valsartan today, last dose was Monday (3 days ago). Mikey Cali describes symptoms including none but denies chest pain, dyspnea, palpitations, orthopnea, PND, fatigue, early saiety, edema, syncope. NYHA:   I  ACC/ AHA Stage:    B    Past Medical History:   has a past medical history of Asthma; Chronic kidney disease; Hyperlipidemia; Hypertension; and Type II or unspecified type diabetes mellitus without mention of complication, not stated as uncontrolled. Surgical History:   has a past surgical history that includes Spine surgery (1995) and Hysterectomy. Social History:   reports that she has never smoked. She has never used smokeless tobacco. She reports that she does not drink alcohol or use drugs. Family History:   Family History   Problem Relation Age of Onset    Diabetes Father        Home Medications:  Prior to Admission medications    Medication Sig Start Date End Date Taking?  Authorizing Provider   insulin lispro (HUMALOG KWIKPEN) 100 UNIT/ML pen Inject 5-12 Units into the skin 3 times daily (before meals) 6/29/18  Yes Any Telles MD   Insulin Degludec (TRESIBA FLEXTOUCH) 200 UNIT/ML SOPN Inject 30 Units into the skin daily 6/27/18  Yes Any Telles MD   simvastatin (ZOCOR) 40 MG tablet TAKE 1 TABLET BY MOUTH EVERY EVENING 6/1/18  Yes Billie Vallecillo MD   dapagliflozin Jenajasper Brantley) 5 MG tablet Take 1 tablet by mouth every morning 3/21/18  Yes Billie Vallecillo MD   metFORMIN (GLUCOPHAGE) 1000 MG tablet TAKE 1 TABLET BY MOUTH TWICE DAILY WITH MEALS 2/21/18  Yes Billie Vallecillo MD   Multiple Vitamins-Minerals (MULTIVITAMIN WOMEN) TABS Take by mouth daily   Yes Historical Provider, MD   Magnesium 400 MG TABS Take by mouth nightly   Yes Historical Provider, MD   cetirizine (ZYRTEC) 10 MG tablet Take 10 mg by mouth daily   Yes Historical Provider, MD   amLODIPine (NORVASC) 5 MG tablet Take 1 tablet by mouth daily 2/8/18  Yes SHIRA Uriostegui CNP   carvedilol (COREG) 25 MG tablet Take 1 tablet by mouth 2 times daily 2/8/18  Yes SHIRA Uriostegui CNP   hydrochlorothiazide (HYDRODIURIL) 25 MG tablet Take 1 tablet by mouth daily 2/8/18  Yes SHIRA Uriostegui CNP   Naproxen Sodium (ALEVE) 220 MG CAPS Take 440 mg by mouth daily   Yes Historical Provider, MD   CINNAMON PO Take by mouth   Yes Historical Provider, MD   irbesartan (AVAPRO) 300 MG tablet Take 1 tablet by mouth nightly 8/7/18   Tanner Patterson MD        Allergies:  Norvasc [amlodipine besylate]; Proventil [albuterol sulfate]; and Tetanus toxoids     Review of Systems:   · Constitutional: there has been no unanticipated weight loss. There's been no change in energy level, sleep pattern, or activity level. · Eyes: No visual changes or diplopia. No scleral icterus. · ENT: No Headaches, hearing loss or vertigo. No mouth sores or sore throat. · Cardiovascular: Reviewed in HPI  · Respiratory: No cough or wheezing, no sputum production. No hematemesis. · Gastrointestinal: No abdominal pain, appetite loss, blood in stools. No change in bowel or bladder habits. · Genitourinary: No dysuria, trouble voiding, or hematuria. · Musculoskeletal:  No gait disturbance, weakness or joint complaints. · Integumentary: No rash or pruritis.   · Neurological: No headache, BUN 24 06/27/2018    BUN 21 03/21/2018    BUN 29 12/20/2017    CREATININE 1.1 06/27/2018    CREATININE 0.9 03/21/2018    CREATININE 1.0 12/20/2017     BNP: No results found for: PROBNP     Cardiac Imaging:  Echo 9/7/17:    Normal left ventricle systolic function with an estimated ejection fraction of 55%.   No regional wall motion abnormalities are seen.   Normal left ventricular diastolic filling pressure.   No significant valvular heart disease.   Systolic pulmonary artery pressure (SPAP) is normal and estimated at 24 mmHg (RA pressure 3 mmHg). Renal artery ultrasound 9/12/17:    1. Technically limited exam due to depth and bowel gas.    2. Within the limits of this exam, there is no evidence to suggest renal    artery stenosis bilaterally.    3. Resistive indexes in the bilateral upper and lower poles are within    normal limits. Assessment:    1. Essential hypertension    2. Mixed hyperlipidemia    3. CRI (chronic renal insufficiency), unspecified stage    4. Type 2 diabetes mellitus with diabetic nephropathy, with long-term current use of insulin ([address]          Plan:   1. Start the irbesartan 300 mg this evening and take with each evening meal  2. No change in other BP medicines  3. Call us if you have 3 consecutive readings of BP > 140/ > 90  4. Follow up with Dr. Taiwo Osborn in 6 months       I appreciate the opportunity of cooperating in the care of this individual.    Jia Arce CNP, 8/9/2018, 8:54 AM    QUALITY MEASURES  1. Tobacco Cessation Counseling: NA  2. Retake of BP if >140/90:   Yes  3. Documentation to PCP/referring for new patient:  Sent to PCP at close of office visit  4. CAD patient on anti-platelet: NA  5. CAD patient on STATIN therapy:  Yes  6.  Patient with CHF and aFib on anticoagulation:  NA

## 2018-09-05 NOTE — TELEPHONE ENCOUNTER
Saul Barakat is requesting refill(s) metformin  Last OV 6/29/18 (pertaining to medication)  LR 2/21/18 (per medication requested)  Next office visit scheduled or attempted Yes   If no, reason:  Pt is scheduled for 10/2/18

## 2018-10-02 ENCOUNTER — OFFICE VISIT (OUTPATIENT)
Dept: FAMILY MEDICINE CLINIC | Age: 63
End: 2018-10-02
Payer: COMMERCIAL

## 2018-10-02 VITALS
HEART RATE: 84 BPM | DIASTOLIC BLOOD PRESSURE: 74 MMHG | BODY MASS INDEX: 35.15 KG/M2 | SYSTOLIC BLOOD PRESSURE: 152 MMHG | HEIGHT: 62 IN | WEIGHT: 191 LBS | OXYGEN SATURATION: 97 %

## 2018-10-02 DIAGNOSIS — I10 ESSENTIAL HYPERTENSION: ICD-10-CM

## 2018-10-02 DIAGNOSIS — E78.2 MIXED HYPERLIPIDEMIA: ICD-10-CM

## 2018-10-02 DIAGNOSIS — Z23 NEEDS FLU SHOT: ICD-10-CM

## 2018-10-02 DIAGNOSIS — E11.21 TYPE 2 DIABETES MELLITUS WITH DIABETIC NEPHROPATHY, WITH LONG-TERM CURRENT USE OF INSULIN (HCC): Primary | ICD-10-CM

## 2018-10-02 DIAGNOSIS — Z79.4 TYPE 2 DIABETES MELLITUS WITH DIABETIC NEPHROPATHY, WITH LONG-TERM CURRENT USE OF INSULIN (HCC): Primary | ICD-10-CM

## 2018-10-02 DIAGNOSIS — Z23 NEED FOR INFLUENZA VACCINATION: ICD-10-CM

## 2018-10-02 LAB
A/G RATIO: 1.7 (ref 1.1–2.2)
ALBUMIN SERPL-MCNC: 4.3 G/DL (ref 3.4–5)
ALP BLD-CCNC: 68 U/L (ref 40–129)
ALT SERPL-CCNC: 84 U/L (ref 10–40)
ANION GAP SERPL CALCULATED.3IONS-SCNC: 17 MMOL/L (ref 3–16)
AST SERPL-CCNC: 53 U/L (ref 15–37)
BILIRUB SERPL-MCNC: 0.3 MG/DL (ref 0–1)
BUN BLDV-MCNC: 24 MG/DL (ref 7–20)
CALCIUM SERPL-MCNC: 10 MG/DL (ref 8.3–10.6)
CHLORIDE BLD-SCNC: 96 MMOL/L (ref 99–110)
CHOLESTEROL, TOTAL: 149 MG/DL (ref 0–199)
CO2: 26 MMOL/L (ref 21–32)
CREAT SERPL-MCNC: 1.1 MG/DL (ref 0.6–1.2)
GFR AFRICAN AMERICAN: >60
GFR NON-AFRICAN AMERICAN: 50
GLOBULIN: 2.6 G/DL
GLUCOSE BLD-MCNC: 222 MG/DL (ref 70–99)
HBA1C MFR BLD: 9.1 %
HDLC SERPL-MCNC: 41 MG/DL (ref 40–60)
LDL CHOLESTEROL CALCULATED: ABNORMAL MG/DL
LDL CHOLESTEROL DIRECT: 66 MG/DL
POTASSIUM SERPL-SCNC: 5 MMOL/L (ref 3.5–5.1)
SODIUM BLD-SCNC: 139 MMOL/L (ref 136–145)
TOTAL PROTEIN: 6.9 G/DL (ref 6.4–8.2)
TRIGL SERPL-MCNC: 344 MG/DL (ref 0–150)
VLDLC SERPL CALC-MCNC: ABNORMAL MG/DL

## 2018-10-02 PROCEDURE — 99214 OFFICE O/P EST MOD 30 MIN: CPT | Performed by: FAMILY MEDICINE

## 2018-10-02 PROCEDURE — 90471 IMMUNIZATION ADMIN: CPT | Performed by: FAMILY MEDICINE

## 2018-10-02 PROCEDURE — 36415 COLL VENOUS BLD VENIPUNCTURE: CPT | Performed by: FAMILY MEDICINE

## 2018-10-02 PROCEDURE — 90686 IIV4 VACC NO PRSV 0.5 ML IM: CPT | Performed by: FAMILY MEDICINE

## 2018-10-02 PROCEDURE — 83036 HEMOGLOBIN GLYCOSYLATED A1C: CPT | Performed by: FAMILY MEDICINE

## 2018-10-02 RX ORDER — AMLODIPINE BESYLATE 5 MG/1
5 TABLET ORAL DAILY
Qty: 30 TABLET | Refills: 11 | Status: SHIPPED | OUTPATIENT
Start: 2018-10-02 | End: 2019-10-04

## 2018-10-02 RX ORDER — HYDROCHLOROTHIAZIDE 25 MG/1
25 TABLET ORAL DAILY
Qty: 30 TABLET | Refills: 11 | Status: SHIPPED | OUTPATIENT
Start: 2018-10-02 | End: 2019-10-03 | Stop reason: SDUPTHER

## 2018-10-02 ASSESSMENT — PATIENT HEALTH QUESTIONNAIRE - PHQ9
SUM OF ALL RESPONSES TO PHQ9 QUESTIONS 1 & 2: 0
2. FEELING DOWN, DEPRESSED OR HOPELESS: 0
SUM OF ALL RESPONSES TO PHQ QUESTIONS 1-9: 0
1. LITTLE INTEREST OR PLEASURE IN DOING THINGS: 0
SUM OF ALL RESPONSES TO PHQ QUESTIONS 1-9: 0

## 2018-10-03 ENCOUNTER — OFFICE VISIT (OUTPATIENT)
Dept: ENDOCRINOLOGY | Age: 63
End: 2018-10-03
Payer: COMMERCIAL

## 2018-10-03 VITALS
WEIGHT: 189.4 LBS | BODY MASS INDEX: 34.85 KG/M2 | OXYGEN SATURATION: 98 % | HEART RATE: 79 BPM | HEIGHT: 62 IN | SYSTOLIC BLOOD PRESSURE: 170 MMHG | DIASTOLIC BLOOD PRESSURE: 76 MMHG

## 2018-10-03 DIAGNOSIS — E78.2 MIXED HYPERLIPIDEMIA: ICD-10-CM

## 2018-10-03 DIAGNOSIS — Z79.4 TYPE 2 DIABETES MELLITUS WITH DIABETIC NEPHROPATHY, WITH LONG-TERM CURRENT USE OF INSULIN (HCC): Primary | ICD-10-CM

## 2018-10-03 DIAGNOSIS — E11.21 TYPE 2 DIABETES MELLITUS WITH DIABETIC NEPHROPATHY, WITH LONG-TERM CURRENT USE OF INSULIN (HCC): Primary | ICD-10-CM

## 2018-10-03 DIAGNOSIS — I10 ESSENTIAL HYPERTENSION: ICD-10-CM

## 2018-10-03 DIAGNOSIS — E66.9 CLASS 1 OBESITY WITH SERIOUS COMORBIDITY AND BODY MASS INDEX (BMI) OF 34.0 TO 34.9 IN ADULT, UNSPECIFIED OBESITY TYPE: ICD-10-CM

## 2018-10-03 PROCEDURE — 95250 CONT GLUC MNTR PHYS/QHP EQP: CPT | Performed by: NURSE PRACTITIONER

## 2018-10-03 PROCEDURE — 99204 OFFICE O/P NEW MOD 45 MIN: CPT | Performed by: NURSE PRACTITIONER

## 2018-10-03 ASSESSMENT — ENCOUNTER SYMPTOMS
EYE PAIN: 0
NAUSEA: 0
DIARRHEA: 0
COLOR CHANGE: 0
SHORTNESS OF BREATH: 0
CONSTIPATION: 0

## 2018-10-03 NOTE — PROGRESS NOTES
Pt came in today for a NP visit with Samina Jefferson CNP . Pt had a  Danya Pro CGM placed during visit. Freestyle danya sensor placed on Left Arm. Lot# [id number]  Exp: 12/31/18. Patient instructed to return on 10/10/2018 to have sensor removed. Advised pt to come back in between 8am -5pm to have sensor removed. Pt stated her understanding.

## 2018-10-03 NOTE — PROGRESS NOTES
concerns  · Retinopathy no concerns with vision, field of vision  · Nephropathy  Component      Latest Ref Rng & Units 6/27/2018   Microalbumin, Random Urine    <2.0 mg/dL <1.20   Creatinine, Ur    28.0 - 259.0 mg/dL 75.1   Microalbumin Creatinine Ratio    0.0 - 30.0 mg/g see below     Diabetic Health Maintenance   · Last Eye Exam: 11/2017  · Last Foot exam:   · Has patient seen a dietitian? Yes  · Current Exercise: No structured exercise  · On ACEI or ARB: Irbesartan 300 mg  · On statin: Simvastatin 40 mg    Risk Factors  · Smoker: no  · ETOH:no  · Co-morbid conditions: HTN, HLD, Obesity, CKD    Hyperlipidemia: Currently is on Simvastatin 40 mg. Current complaints include occasional myalgias but otherwise tolerates well. Lab Results   Component Value Date    CHOL 149 10/02/2018    CHOL 160 03/21/2018    CHOL 138 09/13/2017     Lab Results   Component Value Date    TRIG 344 10/02/2018    TRIG 228 03/21/2018    TRIG 136 09/13/2017     Lab Results   Component Value Date    HDL 41 10/02/2018    HDL 59 03/21/2018    HDL 54 09/13/2017    HDL 47 03/20/2012    HDL 59 09/20/2011    HDL 49 12/17/2010     Lab Results   Component Value Date    LDLCALC see below 10/02/2018    LDLCALC 55 03/21/2018    LDLCALC 57 09/13/2017     Lab Results   Component Value Date    LDLDIRECT 66 10/02/2018    LDLDIRECT 127 09/17/2013    LDLDIRECT 84 12/14/2009     No results found for: CHOLHDLRATIO    Vitamin D deficiency: Currently is on no supplementation. Current complaints include fatigue on daily basis. Last vitamin D level is:  No results found for: VD23T, VITD3, VD25, VITD25    Hypertension  Currently on Irbesartan 300 mg, HCTZ 25 mg. Norvasc 5 mg. BOP elevated at visit, denies symptoms of dizziness, light headedness. Occasional dependent edema. Tries to follow a salt restricted diet.    Lab Results   Component Value Date     10/02/2018    K 5.0 10/02/2018    CL 96 (L) 10/02/2018    CO2 26 10/02/2018    BUN 24 (H) 10/02/2018 Upper Arm Right Upper Arm   Position: Sitting Sitting   Cuff Size: Large Adult Large Adult   Pulse: 79    SpO2: 98%    Weight: 189 lb 6.4 oz (85.9 kg)    Height: 5' 2\" (1.575 m)      Physical Exam   Constitutional: She is oriented to person, place, and time. She appears well-developed and well-nourished. Eyes: Pupils are equal, round, and reactive to light. Neck: Normal range of motion. No thyromegaly present. Cardiovascular: Normal rate, regular rhythm and normal heart sounds. Pulmonary/Chest: Effort normal and breath sounds normal.   Abdominal: She exhibits no distension. Musculoskeletal: Normal range of motion. She exhibits no edema. Neurological: She is alert and oriented to person, place, and time. No sensory deficit. Skin: Skin is warm and dry. No skin changes or evidence of trauma. Psychiatric: She has a normal mood and affect. Her behavior is normal. Thought content normal.   Vitals reviewed. Skeletal foot exam: defered patient request, foot exam at PCP yesterday    Raven Miranda is a 61 y.o. female with uncontrolled diabetes mellitus type 2 associated with HTN, HLD and obesity     Plan  Problem List Items Addressed This Visit     HLD (hyperlipidemia)     LDL goal  < 100; recent at 64  TG elevated at 344  Continue current regimen, managed by PCP  Reviewed impact of carbs on TG levels           HTN (hypertension)     Blood pressure elevated at visit. States has not had morning meds and is anxious.  Managed per PCP         Obesity     Has lost 20 lbs in past 6 months per patient  BMI is 34.64  Reviewed caloric vs carb intake         Relevant Medications    dapagliflozin (FARXIGA) 10 MG tablet    insulin glargine (TOUJEO SOLOSTAR) 300 UNIT/ML injection pen    Type 2 diabetes mellitus (HCC) - Primary     Titration of Toujeo/Tresiba  Increase by 1 unit for every three days that fasting blood sugars are > 150  Decrease by 1 unit for any given day that fasting blood sugars are <

## 2018-10-04 DIAGNOSIS — Z79.4 TYPE 2 DIABETES MELLITUS WITH DIABETIC NEPHROPATHY, WITH LONG-TERM CURRENT USE OF INSULIN (HCC): Primary | ICD-10-CM

## 2018-10-04 DIAGNOSIS — E11.21 TYPE 2 DIABETES MELLITUS WITH DIABETIC NEPHROPATHY, WITH LONG-TERM CURRENT USE OF INSULIN (HCC): Primary | ICD-10-CM

## 2018-11-14 ENCOUNTER — OFFICE VISIT (OUTPATIENT)
Dept: ENDOCRINOLOGY | Age: 63
End: 2018-11-14
Payer: COMMERCIAL

## 2018-11-14 VITALS
BODY MASS INDEX: 34.56 KG/M2 | DIASTOLIC BLOOD PRESSURE: 84 MMHG | SYSTOLIC BLOOD PRESSURE: 155 MMHG | HEART RATE: 67 BPM | WEIGHT: 187.8 LBS | OXYGEN SATURATION: 98 % | HEIGHT: 62 IN

## 2018-11-14 DIAGNOSIS — E66.9 CLASS 1 OBESITY WITH SERIOUS COMORBIDITY AND BODY MASS INDEX (BMI) OF 34.0 TO 34.9 IN ADULT, UNSPECIFIED OBESITY TYPE: ICD-10-CM

## 2018-11-14 DIAGNOSIS — E78.2 MIXED HYPERLIPIDEMIA: ICD-10-CM

## 2018-11-14 DIAGNOSIS — E55.9 VITAMIN D DEFICIENCY: ICD-10-CM

## 2018-11-14 DIAGNOSIS — E11.21 TYPE 2 DIABETES MELLITUS WITH DIABETIC NEPHROPATHY, WITH LONG-TERM CURRENT USE OF INSULIN (HCC): Primary | ICD-10-CM

## 2018-11-14 DIAGNOSIS — I10 ESSENTIAL HYPERTENSION: ICD-10-CM

## 2018-11-14 DIAGNOSIS — Z79.4 TYPE 2 DIABETES MELLITUS WITH DIABETIC NEPHROPATHY, WITH LONG-TERM CURRENT USE OF INSULIN (HCC): Primary | ICD-10-CM

## 2018-11-14 PROCEDURE — 99214 OFFICE O/P EST MOD 30 MIN: CPT | Performed by: NURSE PRACTITIONER

## 2018-11-14 PROCEDURE — 95251 CONT GLUC MNTR ANALYSIS I&R: CPT | Performed by: NURSE PRACTITIONER

## 2018-11-14 ASSESSMENT — ENCOUNTER SYMPTOMS
EYE PAIN: 0
SHORTNESS OF BREATH: 0
DIARRHEA: 0
CONSTIPATION: 0
NAUSEA: 0
COLOR CHANGE: 0

## 2018-11-14 NOTE — PROGRESS NOTES
10/02/2018    BUN 24 (H) 10/02/2018    CREATININE 1.1 10/02/2018    GLUCOSE 222 (H) 10/02/2018    CALCIUM 10.0 10/02/2018    PROT 6.9 10/02/2018    LABALBU 4.3 10/02/2018    BILITOT 0.3 10/02/2018    ALKPHOS 68 10/02/2018    AST 53 (H) 10/02/2018    ALT 84 (H) 10/02/2018    LABGLOM 50 (A) 10/02/2018    GFRAA >60 10/02/2018    AGRATIO 1.7 10/02/2018    GLOB 2.6 10/02/2018       The 10-year ASCVD risk score (Lidia Prajapati et al., 2013) is: 15.8%    Values used to calculate the score:      Age: 61 years      Sex: Female      Is Non- : No      Diabetic: Yes      Tobacco smoker: No      Systolic Blood Pressure: 213 mmHg      Is BP treated: Yes      HDL Cholesterol: 41 mg/dL      Total Cholesterol: 149 mg/dL    Past Medical History:   Diagnosis Date    Asthma     Chronic kidney disease     Hyperlipidemia     Hypertension     Type II or unspecified type diabetes mellitus without mention of complication, not stated as uncontrolled      Family History   Problem Relation Age of Onset    Diabetes Father      Review of Systems   Constitutional: Negative for activity change, appetite change, diaphoresis, fever and unexpected weight change. HENT: Negative for dental problem. Eyes: Negative for pain and visual disturbance. Respiratory: Negative for shortness of breath. Cardiovascular: Negative for chest pain, palpitations and leg swelling. Gastrointestinal: Negative for constipation, diarrhea and nausea. Endocrine: Negative for cold intolerance, heat intolerance, polydipsia, polyphagia and polyuria. Genitourinary: Negative for frequency and urgency. Musculoskeletal: Negative for arthralgias, joint swelling and myalgias. Skin: Negative for color change and pallor. Neurological: Negative for weakness, numbness and headaches. Psychiatric/Behavioral: Negative for dysphoric mood and sleep disturbance. The patient is not nervous/anxious.       Vitals:    11/14/18 1558 11/14/18 1604

## 2018-12-04 LAB — DIABETIC RETINOPATHY: NEGATIVE

## 2018-12-28 DIAGNOSIS — E55.9 VITAMIN D DEFICIENCY: ICD-10-CM

## 2018-12-28 DIAGNOSIS — E11.21 TYPE 2 DIABETES MELLITUS WITH DIABETIC NEPHROPATHY, WITH LONG-TERM CURRENT USE OF INSULIN (HCC): ICD-10-CM

## 2018-12-28 DIAGNOSIS — Z79.4 TYPE 2 DIABETES MELLITUS WITH DIABETIC NEPHROPATHY, WITH LONG-TERM CURRENT USE OF INSULIN (HCC): ICD-10-CM

## 2018-12-28 LAB
A/G RATIO: 1.9 (ref 1.1–2.2)
ALBUMIN SERPL-MCNC: 4.3 G/DL (ref 3.4–5)
ALP BLD-CCNC: 71 U/L (ref 40–129)
ALT SERPL-CCNC: 63 U/L (ref 10–40)
ANION GAP SERPL CALCULATED.3IONS-SCNC: 16 MMOL/L (ref 3–16)
AST SERPL-CCNC: 42 U/L (ref 15–37)
BILIRUB SERPL-MCNC: 0.3 MG/DL (ref 0–1)
BUN BLDV-MCNC: 38 MG/DL (ref 7–20)
CALCIUM SERPL-MCNC: 9.6 MG/DL (ref 8.3–10.6)
CHLORIDE BLD-SCNC: 98 MMOL/L (ref 99–110)
CHOLESTEROL, TOTAL: 158 MG/DL (ref 0–199)
CO2: 24 MMOL/L (ref 21–32)
CREAT SERPL-MCNC: 1.1 MG/DL (ref 0.6–1.2)
CREATININE URINE: 117 MG/DL (ref 28–259)
GFR AFRICAN AMERICAN: >60
GFR NON-AFRICAN AMERICAN: 50
GLOBULIN: 2.3 G/DL
GLUCOSE BLD-MCNC: 197 MG/DL (ref 70–99)
HDLC SERPL-MCNC: 42 MG/DL (ref 40–60)
LDL CHOLESTEROL CALCULATED: 62 MG/DL
MICROALBUMIN UR-MCNC: <1.2 MG/DL
MICROALBUMIN/CREAT UR-RTO: NORMAL MG/G (ref 0–30)
POTASSIUM SERPL-SCNC: 4.5 MMOL/L (ref 3.5–5.1)
SODIUM BLD-SCNC: 138 MMOL/L (ref 136–145)
T4 FREE: 1.5 NG/DL (ref 0.9–1.8)
TOTAL PROTEIN: 6.6 G/DL (ref 6.4–8.2)
TRIGL SERPL-MCNC: 271 MG/DL (ref 0–150)
TSH REFLEX FT4: 14.15 UIU/ML (ref 0.27–4.2)
VITAMIN D 25-HYDROXY: 28.9 NG/ML
VLDLC SERPL CALC-MCNC: 54 MG/DL

## 2018-12-29 LAB
ESTIMATED AVERAGE GLUCOSE: 188.6 MG/DL
HBA1C MFR BLD: 8.2 %

## 2019-01-02 ENCOUNTER — OFFICE VISIT (OUTPATIENT)
Dept: ENDOCRINOLOGY | Age: 64
End: 2019-01-02
Payer: COMMERCIAL

## 2019-01-02 VITALS
DIASTOLIC BLOOD PRESSURE: 78 MMHG | BODY MASS INDEX: 34.63 KG/M2 | OXYGEN SATURATION: 97 % | HEART RATE: 68 BPM | SYSTOLIC BLOOD PRESSURE: 148 MMHG | WEIGHT: 188.2 LBS | HEIGHT: 62 IN

## 2019-01-02 DIAGNOSIS — Z79.4 TYPE 2 DIABETES MELLITUS WITH DIABETIC NEPHROPATHY, WITH LONG-TERM CURRENT USE OF INSULIN (HCC): Primary | ICD-10-CM

## 2019-01-02 DIAGNOSIS — I10 ESSENTIAL HYPERTENSION: ICD-10-CM

## 2019-01-02 DIAGNOSIS — E66.9 CLASS 1 OBESITY WITH SERIOUS COMORBIDITY AND BODY MASS INDEX (BMI) OF 34.0 TO 34.9 IN ADULT, UNSPECIFIED OBESITY TYPE: ICD-10-CM

## 2019-01-02 DIAGNOSIS — N18.9 CHRONIC RENAL IMPAIRMENT, UNSPECIFIED CKD STAGE: ICD-10-CM

## 2019-01-02 DIAGNOSIS — E11.21 TYPE 2 DIABETES MELLITUS WITH DIABETIC NEPHROPATHY, WITH LONG-TERM CURRENT USE OF INSULIN (HCC): Primary | ICD-10-CM

## 2019-01-02 DIAGNOSIS — E55.9 VITAMIN D DEFICIENCY: ICD-10-CM

## 2019-01-02 DIAGNOSIS — E78.2 MIXED HYPERLIPIDEMIA: ICD-10-CM

## 2019-01-02 PROCEDURE — 99214 OFFICE O/P EST MOD 30 MIN: CPT | Performed by: NURSE PRACTITIONER

## 2019-01-02 ASSESSMENT — ENCOUNTER SYMPTOMS
DIARRHEA: 0
CONSTIPATION: 0
SHORTNESS OF BREATH: 0
EYE PAIN: 0
NAUSEA: 0
COLOR CHANGE: 0

## 2019-01-22 ENCOUNTER — OFFICE VISIT (OUTPATIENT)
Dept: FAMILY MEDICINE CLINIC | Age: 64
End: 2019-01-22
Payer: COMMERCIAL

## 2019-01-22 VITALS
SYSTOLIC BLOOD PRESSURE: 112 MMHG | HEART RATE: 80 BPM | DIASTOLIC BLOOD PRESSURE: 62 MMHG | HEIGHT: 62 IN | WEIGHT: 186 LBS | OXYGEN SATURATION: 97 % | BODY MASS INDEX: 34.23 KG/M2

## 2019-01-22 DIAGNOSIS — N18.9 CHRONIC RENAL IMPAIRMENT, UNSPECIFIED CKD STAGE: ICD-10-CM

## 2019-01-22 DIAGNOSIS — Z79.4 TYPE 2 DIABETES MELLITUS WITH DIABETIC NEPHROPATHY, WITH LONG-TERM CURRENT USE OF INSULIN (HCC): Primary | ICD-10-CM

## 2019-01-22 DIAGNOSIS — E78.2 MIXED HYPERLIPIDEMIA: ICD-10-CM

## 2019-01-22 DIAGNOSIS — I10 ESSENTIAL HYPERTENSION: ICD-10-CM

## 2019-01-22 DIAGNOSIS — E11.21 TYPE 2 DIABETES MELLITUS WITH DIABETIC NEPHROPATHY, WITH LONG-TERM CURRENT USE OF INSULIN (HCC): Primary | ICD-10-CM

## 2019-01-22 DIAGNOSIS — Z12.11 SCREEN FOR COLON CANCER: ICD-10-CM

## 2019-01-22 LAB — HBA1C MFR BLD: 8.1 %

## 2019-01-22 PROCEDURE — 99214 OFFICE O/P EST MOD 30 MIN: CPT | Performed by: FAMILY MEDICINE

## 2019-01-22 PROCEDURE — 83036 HEMOGLOBIN GLYCOSYLATED A1C: CPT | Performed by: FAMILY MEDICINE

## 2019-02-12 ENCOUNTER — OFFICE VISIT (OUTPATIENT)
Dept: CARDIOLOGY CLINIC | Age: 64
End: 2019-02-12
Payer: COMMERCIAL

## 2019-02-12 VITALS
WEIGHT: 186.6 LBS | OXYGEN SATURATION: 97 % | HEIGHT: 62 IN | DIASTOLIC BLOOD PRESSURE: 60 MMHG | BODY MASS INDEX: 34.34 KG/M2 | SYSTOLIC BLOOD PRESSURE: 108 MMHG | HEART RATE: 72 BPM

## 2019-02-12 DIAGNOSIS — E78.2 MIXED HYPERLIPIDEMIA: ICD-10-CM

## 2019-02-12 DIAGNOSIS — I10 ESSENTIAL HYPERTENSION: Primary | ICD-10-CM

## 2019-02-12 PROCEDURE — 99213 OFFICE O/P EST LOW 20 MIN: CPT | Performed by: INTERNAL MEDICINE

## 2019-02-13 RX ORDER — DAPAGLIFLOZIN 10 MG/1
10 TABLET, FILM COATED ORAL EVERY MORNING
Qty: 30 TABLET | Refills: 0 | Status: SHIPPED | OUTPATIENT
Start: 2019-02-13 | End: 2019-03-25 | Stop reason: SDUPTHER

## 2019-02-13 RX ORDER — CARVEDILOL 25 MG/1
TABLET ORAL
Qty: 60 TABLET | Refills: 0 | Status: SHIPPED | OUTPATIENT
Start: 2019-02-13 | End: 2019-03-25 | Stop reason: SDUPTHER

## 2019-02-18 RX ORDER — AMLODIPINE BESYLATE 5 MG/1
5 TABLET ORAL DAILY
Qty: 30 TABLET | Refills: 5 | Status: SHIPPED | OUTPATIENT
Start: 2019-02-18 | End: 2019-09-01 | Stop reason: SDUPTHER

## 2019-03-07 DIAGNOSIS — Z12.11 SCREEN FOR COLON CANCER: ICD-10-CM

## 2019-03-26 RX ORDER — DAPAGLIFLOZIN 10 MG/1
10 TABLET, FILM COATED ORAL EVERY MORNING
Qty: 30 TABLET | Refills: 3 | Status: SHIPPED | OUTPATIENT
Start: 2019-03-26 | End: 2019-07-31 | Stop reason: SDUPTHER

## 2019-03-26 RX ORDER — CARVEDILOL 25 MG/1
TABLET ORAL
Qty: 60 TABLET | Refills: 11 | Status: SHIPPED | OUTPATIENT
Start: 2019-03-26 | End: 2020-04-14 | Stop reason: SDUPTHER

## 2019-04-03 ENCOUNTER — HOSPITAL ENCOUNTER (OUTPATIENT)
Age: 64
Discharge: HOME | End: 2019-04-03
Payer: COMMERCIAL

## 2019-04-03 ENCOUNTER — OFFICE VISIT (OUTPATIENT)
Dept: ENDOCRINOLOGY | Age: 64
End: 2019-04-03
Payer: COMMERCIAL

## 2019-04-03 VITALS
BODY MASS INDEX: 34.04 KG/M2 | OXYGEN SATURATION: 97 % | HEIGHT: 62 IN | RESPIRATION RATE: 16 BRPM | HEART RATE: 66 BPM | SYSTOLIC BLOOD PRESSURE: 138 MMHG | DIASTOLIC BLOOD PRESSURE: 70 MMHG | WEIGHT: 185 LBS

## 2019-04-03 DIAGNOSIS — I10 ESSENTIAL HYPERTENSION: ICD-10-CM

## 2019-04-03 DIAGNOSIS — E78.2 MIXED HYPERLIPIDEMIA: Primary | ICD-10-CM

## 2019-04-03 DIAGNOSIS — Z79.4 TYPE 2 DIABETES MELLITUS WITH DIABETIC NEPHROPATHY, WITH LONG-TERM CURRENT USE OF INSULIN (HCC): ICD-10-CM

## 2019-04-03 DIAGNOSIS — E11.21 TYPE 2 DIABETES MELLITUS WITH DIABETIC NEPHROPATHY, WITH LONG-TERM CURRENT USE OF INSULIN (HCC): ICD-10-CM

## 2019-04-03 DIAGNOSIS — E55.9 VITAMIN D DEFICIENCY: ICD-10-CM

## 2019-04-03 DIAGNOSIS — E78.2 MIXED HYPERLIPIDEMIA: ICD-10-CM

## 2019-04-03 DIAGNOSIS — E66.9 CLASS 1 OBESITY WITH SERIOUS COMORBIDITY AND BODY MASS INDEX (BMI) OF 34.0 TO 34.9 IN ADULT, UNSPECIFIED OBESITY TYPE: ICD-10-CM

## 2019-04-03 LAB
A/G RATIO: 1.3 (ref 1.1–2.2)
ALBUMIN SERPL-MCNC: 3.9 G/DL (ref 3.4–5)
ALP BLD-CCNC: 67 U/L (ref 40–129)
ALT SERPL-CCNC: 52 U/L (ref 10–40)
ANION GAP SERPL CALCULATED.3IONS-SCNC: 12 MMOL/L (ref 3–16)
AST SERPL-CCNC: 41 U/L (ref 15–37)
BILIRUB SERPL-MCNC: <0.2 MG/DL (ref 0–1)
BUN BLDV-MCNC: 20 MG/DL (ref 7–20)
CALCIUM SERPL-MCNC: 9.6 MG/DL (ref 8.3–10.6)
CHLORIDE BLD-SCNC: 103 MMOL/L (ref 99–110)
CHOLESTEROL, TOTAL: 182 MG/DL (ref 0–199)
CO2: 27 MMOL/L (ref 21–32)
CREAT SERPL-MCNC: 0.9 MG/DL (ref 0.6–1.2)
GFR AFRICAN AMERICAN: >60
GFR NON-AFRICAN AMERICAN: >60
GLOBULIN: 3.1 G/DL
GLUCOSE BLD-MCNC: 90 MG/DL (ref 70–99)
HBA1C MFR BLD: 8.4 %
HDLC SERPL-MCNC: 42 MG/DL (ref 40–60)
LDL CHOLESTEROL CALCULATED: ABNORMAL MG/DL
LDL CHOLESTEROL DIRECT: 87 MG/DL
POTASSIUM SERPL-SCNC: 4.3 MMOL/L (ref 3.5–5.1)
SODIUM BLD-SCNC: 142 MMOL/L (ref 136–145)
T4 FREE: 1.4 NG/DL (ref 0.9–1.8)
TOTAL PROTEIN: 7 G/DL (ref 6.4–8.2)
TRIGL SERPL-MCNC: 384 MG/DL (ref 0–150)
TSH REFLEX FT4: 6.37 UIU/ML (ref 0.27–4.2)
VLDLC SERPL CALC-MCNC: ABNORMAL MG/DL

## 2019-04-03 PROCEDURE — 99214 OFFICE O/P EST MOD 30 MIN: CPT | Performed by: NURSE PRACTITIONER

## 2019-04-03 PROCEDURE — 36415 COLL VENOUS BLD VENIPUNCTURE: CPT

## 2019-04-03 PROCEDURE — 83036 HEMOGLOBIN GLYCOSYLATED A1C: CPT | Performed by: NURSE PRACTITIONER

## 2019-04-03 PROCEDURE — 84443 ASSAY THYROID STIM HORMONE: CPT

## 2019-04-03 PROCEDURE — 80053 COMPREHEN METABOLIC PANEL: CPT

## 2019-04-03 PROCEDURE — 80061 LIPID PANEL: CPT

## 2019-04-03 PROCEDURE — 84439 ASSAY OF FREE THYROXINE: CPT

## 2019-04-03 ASSESSMENT — ENCOUNTER SYMPTOMS
DIARRHEA: 0
NAUSEA: 0
SHORTNESS OF BREATH: 0
COLOR CHANGE: 0
EYE PAIN: 0
CONSTIPATION: 0

## 2019-04-03 NOTE — PROGRESS NOTES
Endocrinology  [place], [place]  Phone: [phone]   FAX: [phone]    Andrez Randall is a 61 y.o. female who is following up for management of Diabetes Mellitus Type 2. Last A1C:   Lab Results   Component Value Date    LABA1C 8.4 04/03/2019    LABA1C 8.1 01/22/2019    LABA1C 8.2 12/28/2018     Last BP Readings:   BP Readings from Last 3 Encounters:   04/03/19 138/70   02/12/19 108/60   01/22/19 112/62     Last LDL:   Lab Results   Component Value Date    LDLCALC 62 12/28/2018    LDLDIRECT 66 10/02/2018     Aspirin Use: no    Tobacco History:    Smoking status: Never Smoker    Smokeless tobacco: Never Used    Alcohol use No     Diabetes:  Andrez Randall  has been diabetic since 1996 and on insulin for 2 years. Patient reports that diabetes is generally not well controlled. Disease course has been stable but uncontrolled. Patient reports compliance for about 80% of the time and adheres to medication, but usually not to diet and exercise instructions. There have been no recent hospital or ED admissions for hypoglycemia, hyperglycemia or DKA. Patient brought no or glucometer at this visit  Readings per day  4 per patient report--> now checking 2 x daily per patient  Average reading 150  Lowest in past 2 weeks 98  Highest in past 2 weeks 202  Hypoglycemia awareness and symptoms: no  CGM done  Usual carbs per meal:does not count carbs    Lab Results   Component Value Date    .6 12/28/2018    .4 06/27/2018    .4 03/06/2017     Current Medication regimen:   Farxiga 10 mg                                                    Tresiba 30 units -- has not titrated  Humalog 5-20 U AC TID--> 150-200 : 5 units, 3:50 > 200: typically doses BID with 10 -14 U per dose, lunch and dinner  Metformin 1000 mg BID    Diet:  Typically eat one meal a day: vegetables, meat, rice, soups (cream of potato). --> has improved diet and decreased overall carb intake  Coffee with no sugar  Reports financial constraints that prevent making better food choices    Microvascular Complications:  · Neuropathy denies concerns  · Retinopathy no concerns with vision, field of vision  · Nephropathy  Component      Latest Ref Rng & Units 6/27/2018   Microalbumin, Random Urine    <2.0 mg/dL <1.20   Creatinine, Ur    28.0 - 259.0 mg/dL 75.1   Microalbumin Creatinine Ratio    0.0 - 30.0 mg/g see below     Diabetic Health Maintenance   · Last Eye Exam: 11/2017  · Last Foot exam: at visit  · Has patient seen a dietitian? Yes  · Current Exercise: No structured exercise  · On ACEI or ARB: Irbesartan 300 mg  · On statin: Simvastatin 40 mg    Risk Factors  · Smoker: no  · ETOH:no  · Co-morbid conditions: HTN, HLD, Obesity, CKD    Hyperlipidemia: Currently is on Simvastatin 40 mg. Current complaints include occasional myalgias but otherwise tolerates well. Lab Results   Component Value Date    CHOL 158 12/28/2018    CHOL 149 10/02/2018    CHOL 160 03/21/2018     Lab Results   Component Value Date    TRIG 271 12/28/2018    TRIG 344 10/02/2018    TRIG 228 03/21/2018     Lab Results   Component Value Date    HDL 42 12/28/2018    HDL 41 10/02/2018    HDL 59 03/21/2018    HDL 47 03/20/2012    HDL 59 09/20/2011    HDL 49 12/17/2010     Lab Results   Component Value Date    LDLCALC 62 12/28/2018    LDLCALC see below 10/02/2018    LDLCALC 55 03/21/2018     Lab Results   Component Value Date    LDLDIRECT 66 10/02/2018    LDLDIRECT 127 09/17/2013    LDLDIRECT 84 12/14/2009     No results found for: CHOLHDLRATIBETTY    Vitamin D deficiency: Currently is on no supplementation. Current complaints include fatigue on daily basis. Last vitamin D level is:  Lab Results   Component Value Date    VITD25 28.9 12/28/2018       Hypertension  Currently on Irbesartan 300 mg, HCTZ 25 mg. Norvasc 5 mg. BOP elevated at visit, denies symptoms of dizziness, light headedness. Occasional dependent edema. Tries to follow a salt restricted diet.    Lab Results   Component Value Date     12/28/2018    K 4.5 12/28/2018    CL 98 (L) 12/28/2018    CO2 24 12/28/2018    BUN 38 (H) 12/28/2018    CREATININE 1.1 12/28/2018    GLUCOSE 197 (H) 12/28/2018    CALCIUM 9.6 12/28/2018    PROT 6.6 12/28/2018    LABALBU 4.3 12/28/2018    BILITOT 0.3 12/28/2018    ALKPHOS 71 12/28/2018    AST 42 (H) 12/28/2018    ALT 63 (H) 12/28/2018    LABGLOM 50 (A) 12/28/2018    GFRAA >60 12/28/2018    AGRATIO 1.9 12/28/2018    GLOB 2.3 12/28/2018       The 10-year ASCVD risk score (Carlos Eduardo Luu et al., 2013) is: 13%    Values used to calculate the score:      Age: 61 years      Sex: Female      Is Non- : No      Diabetic: Yes      Tobacco smoker: No      Systolic Blood Pressure: 101 mmHg      Is BP treated: Yes      HDL Cholesterol: 42 mg/dL      Total Cholesterol: 158 mg/dL    Past Medical History:   Diagnosis Date    Asthma     Chronic kidney disease     Hyperlipidemia     Hypertension     Type II or unspecified type diabetes mellitus without mention of complication, not stated as uncontrolled      Family History   Problem Relation Age of Onset    Diabetes Father      Review of Systems   Constitutional: Negative for activity change, appetite change, diaphoresis, fever and unexpected weight change. HENT: Negative for dental problem. Eyes: Negative for pain and visual disturbance. Respiratory: Negative for shortness of breath. Cardiovascular: Negative for chest pain, palpitations and leg swelling. Gastrointestinal: Negative for constipation, diarrhea and nausea. Endocrine: Negative for cold intolerance, heat intolerance, polydipsia, polyphagia and polyuria. Genitourinary: Negative for frequency and urgency. Musculoskeletal: Negative for arthralgias, joint swelling and myalgias. Skin: Negative for color change and pallor. Neurological: Negative for weakness, numbness and headaches. Psychiatric/Behavioral: Negative for dysphoric mood and sleep disturbance. proteins  Repeat IPRO: in 6 months or PRN      Assessment  Ike Orr is a 61 y.o. female with uncontrolled diabetes mellitus type 2 associated with HTN, HLD and obesity and Vitamin D deficiency    Plan  Problem List Items Addressed This Visit     Type 2 diabetes mellitus ([address]     Lab Results   Component Value Date    LABA1C 8.4 04/03/2019     Lab Results   Component Value Date    .6 12/28/2018     Has increased  Patient reports a higher carb diet  Started on Trulicity: coupon provided  Financial constraints: will see if qualifies for [employer] program         Relevant Medications    Dulaglutide (TRULICITY) 6.99 BK/5.5DQ SOPN    Other Relevant Orders    Comprehensive Metabolic Panel    TSH WITH REFLEX TO FT4    POCT glycosylated hemoglobin (Hb A1C) (Completed)    HTN (hypertension)     Blood pressure controlled. Continue current regimen         Relevant Orders    Comprehensive Metabolic Panel    HLD (hyperlipidemia) - Primary    Relevant Orders    Lipid Panel    Obesity     Reviewed carbohyrate and caloric restriction  Emphasis on former at this time  Ensure good hydration and adequate electrolyte replacement         Relevant Medications    Dulaglutide (TRULICITY) 8.08 RV/8.7LP SOPN    Vitamin D deficiency     Lab Results   Component Value Date    VITD25 28.9 (L) 12/28/2018     Continue to supplement @ 5000 IU QD           Counseled patient on the areas above for > 30 minutes  Return in about 3 months (around 7/3/2019).

## 2019-04-03 NOTE — ASSESSMENT & PLAN NOTE
Reviewed carbohyrate and caloric restriction  Emphasis on former at this time  Ensure good hydration and adequate electrolyte replacement

## 2019-04-29 ENCOUNTER — TELEPHONE (OUTPATIENT)
Dept: ENDOCRINOLOGY | Age: 64
End: 2019-04-29

## 2019-06-28 RX ORDER — SIMVASTATIN 40 MG
TABLET ORAL
Qty: 90 TABLET | Refills: 0 | Status: SHIPPED | OUTPATIENT
Start: 2019-06-28 | End: 2019-10-03 | Stop reason: SDUPTHER

## 2019-06-28 NOTE — TELEPHONE ENCOUNTER
Luis Miguel Cross is requesting refill(s) Zocor  Last OV 1/22/19 (pertaining to medication)  LR 5/29/19 (per medication requested)  Next office visit scheduled or attempted Yes, 7/24/19   If no, reason:

## 2019-07-03 ENCOUNTER — OFFICE VISIT (OUTPATIENT)
Dept: ENDOCRINOLOGY | Age: 64
End: 2019-07-03
Payer: COMMERCIAL

## 2019-07-03 ENCOUNTER — HOSPITAL ENCOUNTER (OUTPATIENT)
Age: 64
Discharge: HOME | End: 2019-07-03
Payer: COMMERCIAL

## 2019-07-03 VITALS
HEIGHT: 62 IN | HEART RATE: 76 BPM | BODY MASS INDEX: 33.57 KG/M2 | SYSTOLIC BLOOD PRESSURE: 122 MMHG | RESPIRATION RATE: 16 BRPM | WEIGHT: 182.4 LBS | OXYGEN SATURATION: 98 % | DIASTOLIC BLOOD PRESSURE: 72 MMHG

## 2019-07-03 DIAGNOSIS — E78.2 MIXED HYPERLIPIDEMIA: Primary | ICD-10-CM

## 2019-07-03 DIAGNOSIS — I10 ESSENTIAL HYPERTENSION: ICD-10-CM

## 2019-07-03 DIAGNOSIS — R79.89 HIGH SERUM THYROID STIMULATING HORMONE (TSH): ICD-10-CM

## 2019-07-03 DIAGNOSIS — E55.9 VITAMIN D DEFICIENCY: ICD-10-CM

## 2019-07-03 DIAGNOSIS — E11.21 TYPE 2 DIABETES MELLITUS WITH DIABETIC NEPHROPATHY, WITH LONG-TERM CURRENT USE OF INSULIN (HCC): ICD-10-CM

## 2019-07-03 DIAGNOSIS — E66.9 CLASS 1 OBESITY WITH SERIOUS COMORBIDITY AND BODY MASS INDEX (BMI) OF 34.0 TO 34.9 IN ADULT, UNSPECIFIED OBESITY TYPE: ICD-10-CM

## 2019-07-03 DIAGNOSIS — Z79.4 TYPE 2 DIABETES MELLITUS WITH DIABETIC NEPHROPATHY, WITH LONG-TERM CURRENT USE OF INSULIN (HCC): ICD-10-CM

## 2019-07-03 LAB
HBA1C MFR BLD: 7.6 %
T3 FREE: 2.6 PG/ML (ref 2.3–4.2)
T4 FREE: 1.4 NG/DL (ref 0.9–1.8)
THYROID PEROXIDASE (TPO) ABS: 13 IU/ML
TSH SERPL DL<=0.05 MIU/L-ACNC: 7.92 UIU/ML (ref 0.27–4.2)

## 2019-07-03 PROCEDURE — 99214 OFFICE O/P EST MOD 30 MIN: CPT | Performed by: NURSE PRACTITIONER

## 2019-07-03 PROCEDURE — 36415 COLL VENOUS BLD VENIPUNCTURE: CPT

## 2019-07-03 PROCEDURE — 84439 ASSAY OF FREE THYROXINE: CPT

## 2019-07-03 PROCEDURE — 83036 HEMOGLOBIN GLYCOSYLATED A1C: CPT | Performed by: NURSE PRACTITIONER

## 2019-07-03 PROCEDURE — 84443 ASSAY THYROID STIM HORMONE: CPT

## 2019-07-03 PROCEDURE — 86376 MICROSOMAL ANTIBODY EACH: CPT

## 2019-07-03 PROCEDURE — 84481 FREE ASSAY (FT-3): CPT

## 2019-07-03 ASSESSMENT — ENCOUNTER SYMPTOMS
COLOR CHANGE: 0
DIARRHEA: 0
SHORTNESS OF BREATH: 0
CONSTIPATION: 0
EYE PAIN: 0
NAUSEA: 0

## 2019-07-03 NOTE — ASSESSMENT & PLAN NOTE
Lost > 4 lbs from previous visit  Continues to monitor carb and calorie intake  BMI < 30 as interim goal

## 2019-07-03 NOTE — PATIENT INSTRUCTIONS
Lab Results   Component Value Date    LABA1C 7.6 07/03/2019     Goal A1c is < 6.5%    Fasting number: 90 -120  Meals:  Before meal: < 130  3 hours after a meal : < 180  No readings in the > 200 range    Carbs: 20-30 per meal  Avoid all processed carbs:anything out of a bag/can/packet  Humalog/Novalog : AC TID 2: 50 >150    Other meds:    Call the office if blood sugars are less than 60 or more than 500. Diet :   30-40 grams per meal , 3 meals per day, avoid carbs in snack choices  Include moderate proteins and good fats   Ensure adequate hydration and  electrolyte replacement    Exercise :  Recommended exercise is 5-7 days a week for 30-60 mins at least, per day OR a total of 2.5 hours per week , which ever is more feasible. Diabetic Health Maintenance  Follow up with annual eye exams  Follow up with annual podiatry exams if needed  Reviewed sick day management of BG     Other areas of Diabetic Education reviewed:   Carbs: good carbs and bad carbs, importance of carb counting, incorporation of protein with each meal to reduce Glycemic index, importance of portions, Carb/insulin ratio   Fats: Good fats and bad fats, meal planning and supplements.  Discussed how food affects blood sugar readings.     Insulin pumps, how they work and how they affect blood sugar levels   Steroids and effects on blood sugars   Different diabetic medications   Managing high and low sugar readings   Effects of smoking and diabetes   Rotation of sites for subcutaneous medication injection

## 2019-07-03 NOTE — PROGRESS NOTES
Endocrinology  [place], [place]  Phone: [phone]   FAX: [phone]    Ish Paredes is a 59 y.o. female who is following up for management of Diabetes Mellitus Type 2. Last A1C:   Lab Results   Component Value Date    LABA1C 7.6 07/03/2019    LABA1C 8.4 04/03/2019    LABA1C 8.1 01/22/2019     Last BP Readings:   BP Readings from Last 3 Encounters:   07/03/19 122/72   04/03/19 138/70   02/12/19 108/60     Last LDL:   Lab Results   Component Value Date    LDLCALC see below 04/03/2019    LDLDIRECT 87 04/03/2019     Aspirin Use: no    Tobacco History:    Smoking status: Never Smoker    Smokeless tobacco: Never Used    Alcohol use No     Diabetes:  Ish Paredes  has been diabetic since 1996 and on insulin for 2 years. Patient reports that diabetes is generally not well controlled. Disease course has been stable but uncontrolled. Patient reports compliance for about 80% of the time and adheres to medication, but usually not to diet and exercise instructions. There have been no recent hospital or ED admissions for hypoglycemia, hyperglycemia or DKA. Has had 3 back surgeries:  Last in 2002, chronic back pain, sciatica.       Patient brought no or glucometer at this visit  Readings per day  4 per patient report--> now checking 2 x daily per patient: in morning and after dinner  Average reading 150  Lowest in past 2 weeks 98  Highest in past 2 weeks 202  Hypoglycemia awareness and symptoms: no  CGM done  Usual carbs per meal:does not count carbs    Lab Results   Component Value Date    .6 12/28/2018    .4 06/27/2018    .4 03/06/2017     Current Medication regimen:   Farxiga 10 mg                                                    Tresiba 30 units -- has not titrated  Humalog 5-20 U AC TID--> 150-200 : 10 units, 2:50 > 150: typically doses BID with 10 -14 U--> 8-10 units per dose, lunch and dinner  Metformin 2308 mg BID  Trulicity 1.27 mg   GFR > 60    Diet:  Typically eat one meal Skin: Negative for color change and pallor. Neurological: Negative for weakness, numbness and headaches. Psychiatric/Behavioral: Negative for dysphoric mood and sleep disturbance. The patient is not nervous/anxious. Vitals:    07/03/19 0837   BP: 122/72   Site: Right Upper Arm   Position: Sitting   Pulse: 76   Resp: 16   SpO2: 98%   Weight: 182 lb 6.4 oz (82.7 kg)   Height: 5' 2\" (1.575 m)     Physical Exam   Constitutional: She is oriented to person, place, and time. She appears well-developed and well-nourished. Eyes: Pupils are equal, round, and reactive to light. Neck: Normal range of motion. No thyromegaly present. Cardiovascular: Normal rate, regular rhythm and normal heart sounds. Pulmonary/Chest: Effort normal and breath sounds normal.   Abdominal: She exhibits no distension. Musculoskeletal: Normal range of motion. She exhibits no edema. Neurological: She is alert and oriented to person, place, and time. No sensory deficit. Skin: Skin is warm and dry. No skin changes or evidence of trauma. Psychiatric: She has a normal mood and affect. Her behavior is normal. Thought content normal.   Vitals reviewed.     Skeletal foot exam: defered patient request, foot exam at PCP yesterday        Diabetes Continuous Glucose Monitoring Report      Reason for Study:   - Poorly controlled DM without success with standard interventions   - Glucose variability      Current Therapy:   Farxiga 5 mg                                                    Tresiba 30 units   Humalog 5-20 U AC TID--> 150-200 : 5 units, 3:50 > 200  Metformin 1000 mg BID     CGMS Report   CGMS Device: Freestyle Danya Pro  Data collection from 10/3/18 to 10/10/18  Range set @   Average reading 128 mg/dL   Above target range 6%  In target range 92%  Below target range 2%      Impression:   Postprandial hyperglycemia at lunch and dinner  Occasional nocturnal hypoglycemia     Recommendation:   Repeat A1c at visit today: not Results   Component Value Date    VITD25 28.9 (L) 12/28/2018   Ensure 5000 IU QD   Repeat labs in 6 months         High serum thyroid stimulating hormone (TSH)     TSH elevated  Repeat labs and clarify trend         Relevant Orders    T3, Free    T4, Free    TSH without Reflex    Thyroid Peroxidase Antibody      Greater than 30 minutes spent directly counseling patient about topics listed above (such as lifestyle modifications, preventative screenings and/or disease related processes). Return in about 3 months (around 10/3/2019).

## 2019-07-17 ENCOUNTER — TELEPHONE (OUTPATIENT)
Dept: ENDOCRINOLOGY | Age: 64
End: 2019-07-17

## 2019-07-22 ENCOUNTER — TELEPHONE (OUTPATIENT)
Dept: ENDOCRINOLOGY | Age: 64
End: 2019-07-22

## 2019-07-22 DIAGNOSIS — R79.89 HIGH SERUM THYROID STIMULATING HORMONE (TSH): Primary | ICD-10-CM

## 2019-07-22 RX ORDER — LEVOTHYROXINE SODIUM 0.03 MG/1
25 TABLET ORAL DAILY
Qty: 30 TABLET | Refills: 3 | Status: SHIPPED | OUTPATIENT
Start: 2019-07-22 | End: 2019-09-17 | Stop reason: DRUGHIGH

## 2019-07-24 ENCOUNTER — OFFICE VISIT (OUTPATIENT)
Dept: FAMILY MEDICINE CLINIC | Age: 64
End: 2019-07-24
Payer: COMMERCIAL

## 2019-07-24 VITALS
SYSTOLIC BLOOD PRESSURE: 116 MMHG | DIASTOLIC BLOOD PRESSURE: 60 MMHG | BODY MASS INDEX: 33.27 KG/M2 | HEART RATE: 71 BPM | WEIGHT: 180.8 LBS | OXYGEN SATURATION: 98 % | HEIGHT: 62 IN

## 2019-07-24 DIAGNOSIS — N18.9 CHRONIC RENAL IMPAIRMENT, UNSPECIFIED CKD STAGE: ICD-10-CM

## 2019-07-24 DIAGNOSIS — E78.2 MIXED HYPERLIPIDEMIA: ICD-10-CM

## 2019-07-24 DIAGNOSIS — E11.21 TYPE 2 DIABETES MELLITUS WITH DIABETIC NEPHROPATHY, WITH LONG-TERM CURRENT USE OF INSULIN (HCC): ICD-10-CM

## 2019-07-24 DIAGNOSIS — Z79.4 TYPE 2 DIABETES MELLITUS WITH DIABETIC NEPHROPATHY, WITH LONG-TERM CURRENT USE OF INSULIN (HCC): ICD-10-CM

## 2019-07-24 DIAGNOSIS — I10 ESSENTIAL HYPERTENSION: Primary | ICD-10-CM

## 2019-07-24 PROCEDURE — 99214 OFFICE O/P EST MOD 30 MIN: CPT | Performed by: FAMILY MEDICINE

## 2019-07-24 RX ORDER — ACETAMINOPHEN 500 MG
500 TABLET ORAL EVERY 6 HOURS PRN
COMMUNITY
End: 2020-07-27

## 2019-07-24 ASSESSMENT — PATIENT HEALTH QUESTIONNAIRE - PHQ9
SUM OF ALL RESPONSES TO PHQ QUESTIONS 1-9: 0
SUM OF ALL RESPONSES TO PHQ QUESTIONS 1-9: 0
1. LITTLE INTEREST OR PLEASURE IN DOING THINGS: 0
SUM OF ALL RESPONSES TO PHQ9 QUESTIONS 1 & 2: 0
2. FEELING DOWN, DEPRESSED OR HOPELESS: 0

## 2019-07-24 NOTE — PROGRESS NOTES
Insulin Degludec (TRESIBA FLEXTOUCH) 200 UNIT/ML SOPN Inject 50 Units into the skin nightly 3 pen 3    Dulaglutide (TRULICITY) 1.5 TD/3.2FV SOPN Inject 1.5 mg into the skin once a week 4 pen 5    simvastatin (ZOCOR) 40 MG tablet TAKE 1 TABLET BY MOUTH EVERY EVENING 90 tablet 0    carvedilol (COREG) 25 MG tablet TAKE 1 TABLET BY MOUTH TWICE DAILY 60 tablet 11    FARXIGA 10 MG tablet TAKE 1 TABLET BY MOUTH EVERY MORNING 30 tablet 3    amLODIPine (NORVASC) 5 MG tablet TAKE 1 TABLET BY MOUTH DAILY 30 tablet 5    vitamin D (CHOLECALCIFEROL) 1000 UNIT TABS tablet Take 1,000 Units by mouth daily      insulin lispro (HUMALOG KWIKPEN) 100 UNIT/ML pen Inject 5-20 Units into the skin 3 times daily (before meals) 1 pen 5    amLODIPine (NORVASC) 5 MG tablet Take 1 tablet by mouth daily 30 tablet 11    hydrochlorothiazide (HYDRODIURIL) 25 MG tablet Take 1 tablet by mouth daily 30 tablet 11    metFORMIN (GLUCOPHAGE) 1000 MG tablet TAKE 1 TABLET BY MOUTH TWICE DAILY WITH MEALS 180 tablet 3    irbesartan (AVAPRO) 300 MG tablet Take 1 tablet by mouth every evening 90 tablet 2    Multiple Vitamins-Minerals (MULTIVITAMIN WOMEN) TABS Take by mouth daily      Magnesium 400 MG TABS Take by mouth nightly      cetirizine (ZYRTEC) 10 MG tablet Take 10 mg by mouth daily      CINNAMON PO Take by mouth       No current facility-administered medications for this visit. OBJECTIVE:  /60   Pulse 71   Ht 5' 2\" (1.575 m)   Wt 180 lb 12.8 oz (82 kg)   SpO2 98%   BMI 33.07 kg/m²   General: NAD, non-toxic  Neck: no JVD or bruits  S1 and S2 normal, no murmurs, clicks, gallops or rubs. Regular rate and rhythm. Chest is clear; no wheezes or rales. No edema or JVD. Vascular : DP 1-2+=  Neuro: alert, no CN or motor deficits, monofilament normal BL  Psych: normal mood, thought and judgement        ASSESSMENT:   Diabetes Mellitus, today's A1C is per endo   Diagnosis Orders   1.  Essential hypertension   very well controlled 2. Chronic renal impairment, unspecified CKD stage   stable   3. Mixed hyperlipidemia   elevated triglycerides otherwise normal   4. Type 2 diabetes mellitus with diabetic nephropathy, with long-term current use of insulin (HCC)  HM DIABETES FOOT EXAM, improving continue working with endocrinology             Plan:  1)  Medication: continue current medication regimen unchanged  2)  Recheck in 6 months, sooner should new symptoms or problems arise. 3) LLR  Clemencia received counseling on the following healthy behaviors: nutrition, exercise and medication adherence        Discussed use, benefit, and side effects of prescribed medications. Barriers to medication compliance addressed. All patient questions answered. Pt voiced understanding.            Yahaira Jacobson M.D.

## 2019-07-31 DIAGNOSIS — E11.21 TYPE 2 DIABETES MELLITUS WITH DIABETIC NEPHROPATHY, WITH LONG-TERM CURRENT USE OF INSULIN (HCC): Primary | ICD-10-CM

## 2019-07-31 DIAGNOSIS — Z79.4 TYPE 2 DIABETES MELLITUS WITH DIABETIC NEPHROPATHY, WITH LONG-TERM CURRENT USE OF INSULIN (HCC): Primary | ICD-10-CM

## 2019-07-31 RX ORDER — DAPAGLIFLOZIN 10 MG/1
10 TABLET, FILM COATED ORAL EVERY MORNING
Qty: 30 TABLET | Refills: 3 | Status: SHIPPED | OUTPATIENT
Start: 2019-07-31 | End: 2020-01-27

## 2019-09-13 ENCOUNTER — HOSPITAL ENCOUNTER (OUTPATIENT)
Age: 64
Discharge: HOME | End: 2019-09-13
Payer: COMMERCIAL

## 2019-09-13 DIAGNOSIS — R79.89 HIGH SERUM THYROID STIMULATING HORMONE (TSH): ICD-10-CM

## 2019-09-13 LAB
T4 FREE: 1.5 NG/DL (ref 0.9–1.8)
TSH SERPL DL<=0.05 MIU/L-ACNC: 6.39 UIU/ML (ref 0.27–4.2)

## 2019-09-13 PROCEDURE — 84439 ASSAY OF FREE THYROXINE: CPT

## 2019-09-13 PROCEDURE — 84443 ASSAY THYROID STIM HORMONE: CPT

## 2019-09-13 PROCEDURE — 36415 COLL VENOUS BLD VENIPUNCTURE: CPT

## 2019-09-17 DIAGNOSIS — E66.9 CLASS 1 OBESITY WITH SERIOUS COMORBIDITY AND BODY MASS INDEX (BMI) OF 34.0 TO 34.9 IN ADULT, UNSPECIFIED OBESITY TYPE: Primary | ICD-10-CM

## 2019-09-17 RX ORDER — LEVOTHYROXINE SODIUM 0.05 MG/1
50 TABLET ORAL DAILY
Qty: 30 TABLET | Refills: 3 | Status: SHIPPED | OUTPATIENT
Start: 2019-09-17 | End: 2020-07-22 | Stop reason: SINTOL

## 2019-10-03 DIAGNOSIS — I10 ESSENTIAL HYPERTENSION: ICD-10-CM

## 2019-10-04 RX ORDER — SIMVASTATIN 40 MG
TABLET ORAL
Qty: 90 TABLET | Refills: 0 | Status: SHIPPED | OUTPATIENT
Start: 2019-10-04 | End: 2020-01-06

## 2019-10-04 RX ORDER — AMLODIPINE BESYLATE 5 MG/1
5 TABLET ORAL DAILY
Qty: 90 TABLET | Refills: 1 | Status: SHIPPED | OUTPATIENT
Start: 2019-10-04 | End: 2020-04-14 | Stop reason: SDUPTHER

## 2019-10-04 RX ORDER — HYDROCHLOROTHIAZIDE 25 MG/1
25 TABLET ORAL DAILY
Qty: 30 TABLET | Refills: 0 | Status: SHIPPED | OUTPATIENT
Start: 2019-10-04 | End: 2019-11-05 | Stop reason: SDUPTHER

## 2019-10-09 ENCOUNTER — OFFICE VISIT (OUTPATIENT)
Dept: ENDOCRINOLOGY | Age: 64
End: 2019-10-09
Payer: COMMERCIAL

## 2019-10-09 VITALS
DIASTOLIC BLOOD PRESSURE: 78 MMHG | SYSTOLIC BLOOD PRESSURE: 162 MMHG | BODY MASS INDEX: 32.97 KG/M2 | WEIGHT: 179.2 LBS | HEIGHT: 62 IN | HEART RATE: 76 BPM | RESPIRATION RATE: 18 BRPM | OXYGEN SATURATION: 99 %

## 2019-10-09 DIAGNOSIS — M81.0 OSTEOPOROSIS, POST-MENOPAUSAL: ICD-10-CM

## 2019-10-09 DIAGNOSIS — E78.2 MIXED HYPERLIPIDEMIA: ICD-10-CM

## 2019-10-09 DIAGNOSIS — Z79.4 TYPE 2 DIABETES MELLITUS WITH DIABETIC NEPHROPATHY, WITH LONG-TERM CURRENT USE OF INSULIN (HCC): Primary | ICD-10-CM

## 2019-10-09 DIAGNOSIS — R53.82 CHRONIC FATIGUE: ICD-10-CM

## 2019-10-09 DIAGNOSIS — R79.89 HIGH SERUM THYROID STIMULATING HORMONE (TSH): ICD-10-CM

## 2019-10-09 DIAGNOSIS — E66.9 CLASS 1 OBESITY WITH SERIOUS COMORBIDITY AND BODY MASS INDEX (BMI) OF 34.0 TO 34.9 IN ADULT, UNSPECIFIED OBESITY TYPE: ICD-10-CM

## 2019-10-09 DIAGNOSIS — E55.9 VITAMIN D DEFICIENCY: ICD-10-CM

## 2019-10-09 DIAGNOSIS — I10 ESSENTIAL HYPERTENSION: ICD-10-CM

## 2019-10-09 DIAGNOSIS — E11.21 TYPE 2 DIABETES MELLITUS WITH DIABETIC NEPHROPATHY, WITH LONG-TERM CURRENT USE OF INSULIN (HCC): Primary | ICD-10-CM

## 2019-10-09 LAB — HBA1C MFR BLD: 6.4 %

## 2019-10-09 PROCEDURE — 99214 OFFICE O/P EST MOD 30 MIN: CPT | Performed by: NURSE PRACTITIONER

## 2019-10-09 PROCEDURE — 83036 HEMOGLOBIN GLYCOSYLATED A1C: CPT | Performed by: NURSE PRACTITIONER

## 2019-10-09 ASSESSMENT — ENCOUNTER SYMPTOMS
DIARRHEA: 0
NAUSEA: 0
SHORTNESS OF BREATH: 0
COLOR CHANGE: 0
EYE PAIN: 0
CONSTIPATION: 0

## 2019-10-11 ENCOUNTER — HOSPITAL ENCOUNTER (OUTPATIENT)
Dept: WOMENS IMAGING | Age: 64
Discharge: HOME | End: 2019-10-11
Payer: COMMERCIAL

## 2019-10-11 DIAGNOSIS — Z12.31 ENCOUNTER FOR SCREENING MAMMOGRAM FOR BREAST CANCER: ICD-10-CM

## 2019-10-11 PROCEDURE — 77067 SCR MAMMO BI INCL CAD: CPT

## 2019-10-16 ENCOUNTER — HOSPITAL ENCOUNTER (OUTPATIENT)
Dept: WOMENS IMAGING | Age: 64
Discharge: HOME | End: 2019-10-16
Payer: COMMERCIAL

## 2019-10-16 DIAGNOSIS — M81.0 OSTEOPOROSIS, POST-MENOPAUSAL: ICD-10-CM

## 2019-10-16 PROCEDURE — 77080 DXA BONE DENSITY AXIAL: CPT

## 2019-10-18 ENCOUNTER — HOSPITAL ENCOUNTER (OUTPATIENT)
Age: 64
Discharge: HOME | End: 2019-10-18
Payer: COMMERCIAL

## 2019-10-18 DIAGNOSIS — E66.9 CLASS 1 OBESITY WITH SERIOUS COMORBIDITY AND BODY MASS INDEX (BMI) OF 34.0 TO 34.9 IN ADULT, UNSPECIFIED OBESITY TYPE: ICD-10-CM

## 2019-10-18 DIAGNOSIS — R53.82 CHRONIC FATIGUE: ICD-10-CM

## 2019-10-18 DIAGNOSIS — Z79.4 TYPE 2 DIABETES MELLITUS WITH DIABETIC NEPHROPATHY, WITH LONG-TERM CURRENT USE OF INSULIN (HCC): ICD-10-CM

## 2019-10-18 DIAGNOSIS — E55.9 VITAMIN D DEFICIENCY: ICD-10-CM

## 2019-10-18 DIAGNOSIS — E11.21 TYPE 2 DIABETES MELLITUS WITH DIABETIC NEPHROPATHY, WITH LONG-TERM CURRENT USE OF INSULIN (HCC): ICD-10-CM

## 2019-10-18 LAB
FOLATE: >20 NG/ML (ref 4.78–24.2)
HCT VFR BLD CALC: 38.9 % (ref 36–48)
HEMOGLOBIN: 13.2 G/DL (ref 12–16)
MCH RBC QN AUTO: 30.8 PG (ref 26–34)
MCHC RBC AUTO-ENTMCNC: 34 G/DL (ref 31–36)
MCV RBC AUTO: 90.5 FL (ref 80–100)
PDW BLD-RTO: 13.3 % (ref 12.4–15.4)
PLATELET # BLD: 248 K/UL (ref 135–450)
PMV BLD AUTO: 9.2 FL (ref 5–10.5)
RBC # BLD: 4.3 M/UL (ref 4–5.2)
T4 FREE: 1.4 NG/DL (ref 0.9–1.8)
TSH SERPL DL<=0.05 MIU/L-ACNC: 4.02 UIU/ML (ref 0.27–4.2)
VITAMIN B-12: 205 PG/ML (ref 211–911)
VITAMIN D 25-HYDROXY: 23.3 NG/ML
WBC # BLD: 8.6 K/UL (ref 4–11)

## 2019-10-18 PROCEDURE — 36415 COLL VENOUS BLD VENIPUNCTURE: CPT

## 2019-10-18 PROCEDURE — 84443 ASSAY THYROID STIM HORMONE: CPT

## 2019-10-18 PROCEDURE — 85027 COMPLETE CBC AUTOMATED: CPT

## 2019-10-18 PROCEDURE — 84439 ASSAY OF FREE THYROXINE: CPT

## 2019-10-18 PROCEDURE — 82306 VITAMIN D 25 HYDROXY: CPT

## 2019-10-18 PROCEDURE — 82746 ASSAY OF FOLIC ACID SERUM: CPT

## 2019-10-18 PROCEDURE — 82607 VITAMIN B-12: CPT

## 2019-11-05 ENCOUNTER — TELEPHONE (OUTPATIENT)
Dept: ENDOCRINOLOGY | Age: 64
End: 2019-11-05

## 2019-11-22 ENCOUNTER — TELEPHONE (OUTPATIENT)
Dept: ENDOCRINOLOGY | Age: 64
End: 2019-11-22

## 2019-11-22 ENCOUNTER — HOSPITAL ENCOUNTER (OUTPATIENT)
Age: 64
Discharge: HOME | End: 2019-11-22
Payer: COMMERCIAL

## 2019-11-22 LAB — RHEUMATOID FACTOR: <10 IU/ML

## 2019-11-22 PROCEDURE — 86039 ANTINUCLEAR ANTIBODIES (ANA): CPT

## 2019-11-22 PROCEDURE — 83519 RIA NONANTIBODY: CPT

## 2019-11-22 PROCEDURE — 36415 COLL VENOUS BLD VENIPUNCTURE: CPT

## 2019-11-22 PROCEDURE — 86038 ANTINUCLEAR ANTIBODIES: CPT

## 2019-11-22 PROCEDURE — 86431 RHEUMATOID FACTOR QUANT: CPT

## 2019-11-22 PROCEDURE — 83516 IMMUNOASSAY NONANTIBODY: CPT

## 2019-11-25 LAB
ANA INTERPRETATION: ABNORMAL
ANA TITER: ABNORMAL
ANTI-NUCLEAR ANTIBODY (ANA): POSITIVE

## 2019-11-26 LAB
ACETYLCHOLINE BINDING ANTIBODY: 0 NMOL/L (ref 0–0.4)
ACETYLCHOLINE BLOCKING AB: 0 % (ref 0–26)

## 2019-12-09 LAB — DIABETIC RETINOPATHY: NEGATIVE

## 2019-12-10 LAB
CATARACTS: NEGATIVE
DIABETIC RETINOPATHY: NEGATIVE
GLAUCOMA: NEGATIVE
INTRAOCULAR PRESSURE EYE: NORMAL
VISUAL ACUITY DISTANCE LEFT EYE: NORMAL
VISUAL ACUITY DISTANCE RIGHT EYE: NORMAL

## 2020-01-27 ENCOUNTER — OFFICE VISIT (OUTPATIENT)
Dept: FAMILY MEDICINE CLINIC | Age: 65
End: 2020-01-27
Payer: COMMERCIAL

## 2020-01-27 VITALS
WEIGHT: 181 LBS | HEIGHT: 62 IN | OXYGEN SATURATION: 99 % | HEART RATE: 69 BPM | BODY MASS INDEX: 33.31 KG/M2 | DIASTOLIC BLOOD PRESSURE: 60 MMHG | SYSTOLIC BLOOD PRESSURE: 120 MMHG

## 2020-01-27 PROBLEM — M62.89 MYOTONIA: Status: ACTIVE | Noted: 2020-01-27

## 2020-01-27 LAB
A/G RATIO: 1.6 (ref 1.1–2.2)
ALBUMIN SERPL-MCNC: 3.9 G/DL (ref 3.4–5)
ALP BLD-CCNC: 51 U/L (ref 40–129)
ALT SERPL-CCNC: 206 U/L (ref 10–40)
ANION GAP SERPL CALCULATED.3IONS-SCNC: 14 MMOL/L (ref 3–16)
AST SERPL-CCNC: 140 U/L (ref 15–37)
BILIRUB SERPL-MCNC: <0.2 MG/DL (ref 0–1)
BUN BLDV-MCNC: 32 MG/DL (ref 7–20)
CALCIUM SERPL-MCNC: 9.5 MG/DL (ref 8.3–10.6)
CHLORIDE BLD-SCNC: 104 MMOL/L (ref 99–110)
CHOLESTEROL, TOTAL: 148 MG/DL (ref 0–199)
CO2: 26 MMOL/L (ref 21–32)
CREAT SERPL-MCNC: 0.7 MG/DL (ref 0.6–1.2)
GFR AFRICAN AMERICAN: >60
GFR NON-AFRICAN AMERICAN: >60
GLOBULIN: 2.4 G/DL
GLUCOSE BLD-MCNC: 120 MG/DL (ref 70–99)
HBA1C MFR BLD: 6.5 %
HDLC SERPL-MCNC: 48 MG/DL (ref 40–60)
LDL CHOLESTEROL CALCULATED: 50 MG/DL
POTASSIUM SERPL-SCNC: 4.7 MMOL/L (ref 3.5–5.1)
SODIUM BLD-SCNC: 144 MMOL/L (ref 136–145)
TOTAL PROTEIN: 6.3 G/DL (ref 6.4–8.2)
TRIGL SERPL-MCNC: 249 MG/DL (ref 0–150)
VLDLC SERPL CALC-MCNC: 50 MG/DL

## 2020-01-27 PROCEDURE — 36415 COLL VENOUS BLD VENIPUNCTURE: CPT | Performed by: FAMILY MEDICINE

## 2020-01-27 PROCEDURE — 99214 OFFICE O/P EST MOD 30 MIN: CPT | Performed by: FAMILY MEDICINE

## 2020-01-27 PROCEDURE — 83036 HEMOGLOBIN GLYCOSYLATED A1C: CPT | Performed by: FAMILY MEDICINE

## 2020-01-27 RX ORDER — HYDROCHLOROTHIAZIDE 25 MG/1
25 TABLET ORAL DAILY
Qty: 30 TABLET | Refills: 5 | Status: SHIPPED | OUTPATIENT
Start: 2020-01-27 | End: 2020-02-06

## 2020-01-27 RX ORDER — PYRIDOXINE HCL (VITAMIN B6) 50 MG
TABLET ORAL
COMMUNITY

## 2020-01-27 NOTE — PROGRESS NOTES
SUBJECTIVE:  Florencio Smyth is a 59 y.o. female who presents for evaluation of hypertension and hyperlipidemia. She denies any symptoms referable to her elevated blood pressure. Specifically denies chest pain, palpitations, dyspnea, orthopnea, PND or peripheral edema   No anorexia, arthralgia, or leg cramps noted. Current medication regimen is as listed below. She denies any side effects of medication, and has been taking it regularly. Lab Results   Component Value Date    LDLCALC see below 04/03/2019    LDLDIRECT 87 04/03/2019       Patient also has a history of insulin requiring type 2 diabetes. She is under the care of endocrinology for this. Her last A1c was 6.4 in October. Patient's last BUN/creatinine was normal at 20 and 0.9.   she also has been identified with hypothyroidism, last TSH was 4.02 in October. Endocrinology also manages that condition as well. Since patient's last visit she has been evaluated for progressive weakness of her muscles. She is now seeing neurology for diagnosis of myotonia. Patient says it affects her left side more than her right side but it affects all her muscles. Patient says there is no treatment for it is a progressive process. She is having difficulty moving. She continues to work full-time. She says she needs to work another 3 years. Patient's medication list was reconciled. She is not sure if she is taking Avapro or not.     Current Outpatient Medications   Medication Sig Dispense Refill    Cyanocobalamin (B-12) 100 MCG TABS       hydrochlorothiazide (HYDRODIURIL) 25 MG tablet Take 1 tablet by mouth daily 30 tablet 5    simvastatin (ZOCOR) 40 MG tablet TAKE 1 TABLET BY MOUTH EVERY EVENING 90 tablet 1    metFORMIN (GLUCOPHAGE) 1000 MG tablet TAKE 1 TABLET BY MOUTH TWICE DAILY WITH MEALS 180 tablet 1    amLODIPine (NORVASC) 5 MG tablet TAKE 1 TABLET BY MOUTH DAILY 90 tablet 1    levothyroxine (SYNTHROID) 50 MCG tablet Take 1 tablet by mouth Daily 30 tablet 3    acetaminophen (TYLENOL) 500 MG tablet Take 500 mg by mouth every 6 hours as needed for Pain      Insulin Degludec (TRESIBA FLEXTOUCH) 200 UNIT/ML SOPN Inject 50 Units into the skin nightly 3 pen 3    Dulaglutide (TRULICITY) 1.5 JS/3.1AB SOPN Inject 1.5 mg into the skin once a week 4 pen 5    carvedilol (COREG) 25 MG tablet TAKE 1 TABLET BY MOUTH TWICE DAILY 60 tablet 11    vitamin D (CHOLECALCIFEROL) 1000 UNIT TABS tablet Take 1,000 Units by mouth daily      insulin lispro (HUMALOG KWIKPEN) 100 UNIT/ML pen Inject 5-20 Units into the skin 3 times daily (before meals) 1 pen 5    irbesartan (AVAPRO) 300 MG tablet Take 1 tablet by mouth every evening 90 tablet 2    Multiple Vitamins-Minerals (MULTIVITAMIN WOMEN) TABS Take by mouth daily      Magnesium 400 MG TABS Take by mouth nightly      cetirizine (ZYRTEC) 10 MG tablet Take 10 mg by mouth daily      CINNAMON PO Take by mouth       No current facility-administered medications for this visit. Allergies   Allergen Reactions    Norvasc [Amlodipine Besylate] Other (See Comments)     edema    Proventil [Albuterol Sulfate] Other (See Comments)     Paralyzed vocal cords    Tetanus Toxoids        Social History     Tobacco Use    Smoking status: Never Smoker    Smokeless tobacco: Never Used   Substance Use Topics    Alcohol use: No       OBJECTIVE:   /60   Pulse 69   Ht 5' 2\" (1.575 m)   Wt 181 lb (82.1 kg)   SpO2 99%   BMI 33.11 kg/m²   NAD  Neck no bruit or JVD  S1 and S2 normal, no murmurs, clicks, gallops or rubs. Regular rate and rhythm. Chest is clear; no wheezes or rales. No edema . Neuro alert, no CN deficits, psych nl mood, thought and judgement    ASSESSMENT:   Diagnosis Orders   1. Essential hypertension, well-controlled patient was given a copy of her medication list and was asked to verify if she is taking Avapro or not Comprehensive Metabolic Panel    hydrochlorothiazide (HYDRODIURIL) 25 MG tablet   2.  Type 2 diabetes mellitus with diabetic nephropathy, with long-term current use of insulin (HCC)  POCT glycosylated hemoglobin (Hb A1C), today 6.5 well-controlled plan per endocrinology   3. Chronic renal impairment, unspecified CKD stage   labs pending   4. Mixed hyperlipidemia  Lipid Panel, labs pending   5. Myotonia  Handicap placard, plan per neurology        Plan:  1)  Medication: continue current medication regimen unchanged  2)  Recheck in 6 months, sooner should new symptoms or problems arise. 3) TREVOR Khanna received counseling on the following healthy behaviors: medication adherence         Discussed use, benefit, and side effects of prescribed medications. Barriers to medication compliance addressed. All patient questions answered. Pt voiced understanding.

## 2020-02-06 RX ORDER — HYDROCHLOROTHIAZIDE 25 MG/1
25 TABLET ORAL DAILY
Qty: 30 TABLET | Refills: 5 | Status: SHIPPED | OUTPATIENT
Start: 2020-02-06 | End: 2020-08-12

## 2020-02-07 ENCOUNTER — HOSPITAL ENCOUNTER (OUTPATIENT)
Age: 65
Discharge: HOME | End: 2020-02-07
Payer: COMMERCIAL

## 2020-02-07 LAB
ALBUMIN SERPL-MCNC: 4 G/DL (ref 3.4–5)
ALP BLD-CCNC: 53 U/L (ref 40–129)
ALT SERPL-CCNC: 193 U/L (ref 10–40)
AST SERPL-CCNC: 118 U/L (ref 15–37)
BILIRUB SERPL-MCNC: <0.2 MG/DL (ref 0–1)
BILIRUBIN DIRECT: <0.2 MG/DL (ref 0–0.3)
BILIRUBIN, INDIRECT: ABNORMAL MG/DL (ref 0–1)
TOTAL PROTEIN: 6.5 G/DL (ref 6.4–8.2)

## 2020-02-07 PROCEDURE — 80076 HEPATIC FUNCTION PANEL: CPT

## 2020-02-07 PROCEDURE — 36415 COLL VENOUS BLD VENIPUNCTURE: CPT

## 2020-02-21 ENCOUNTER — HOSPITAL ENCOUNTER (OUTPATIENT)
Age: 65
Discharge: HOME | End: 2020-02-21
Payer: COMMERCIAL

## 2020-02-21 LAB
ALBUMIN SERPL-MCNC: 3.9 G/DL (ref 3.4–5)
ALP BLD-CCNC: 60 U/L (ref 40–129)
ALT SERPL-CCNC: 150 U/L (ref 10–40)
AST SERPL-CCNC: 100 U/L (ref 15–37)
BILIRUB SERPL-MCNC: <0.2 MG/DL (ref 0–1)
BILIRUBIN DIRECT: <0.2 MG/DL (ref 0–0.3)
BILIRUBIN, INDIRECT: ABNORMAL MG/DL (ref 0–1)
TOTAL PROTEIN: 6.7 G/DL (ref 6.4–8.2)

## 2020-02-21 PROCEDURE — 80076 HEPATIC FUNCTION PANEL: CPT

## 2020-02-21 PROCEDURE — 36415 COLL VENOUS BLD VENIPUNCTURE: CPT

## 2020-03-06 ENCOUNTER — HOSPITAL ENCOUNTER (OUTPATIENT)
Age: 65
Discharge: HOME | End: 2020-03-06
Payer: COMMERCIAL

## 2020-03-06 ENCOUNTER — OFFICE VISIT (OUTPATIENT)
Dept: GASTROENTEROLOGY | Age: 65
End: 2020-03-06
Payer: COMMERCIAL

## 2020-03-06 VITALS
BODY MASS INDEX: 32.76 KG/M2 | WEIGHT: 178 LBS | SYSTOLIC BLOOD PRESSURE: 132 MMHG | HEIGHT: 62 IN | DIASTOLIC BLOOD PRESSURE: 78 MMHG

## 2020-03-06 LAB
FERRITIN: 36.4 NG/ML (ref 15–150)
GAMMA GLUTAMYL TRANSFERASE: 18 U/L (ref 5–36)
HAV IGM SER IA-ACNC: NORMAL
HEPATITIS B CORE IGM ANTIBODY: NORMAL
HEPATITIS B SURFACE ANTIGEN INTERPRETATION: NORMAL
HEPATITIS C ANTIBODY INTERPRETATION: NORMAL
IGA: 167 MG/DL (ref 70–400)
INR BLD: 0.92 (ref 0.86–1.14)
IRON SATURATION: 13 % (ref 15–50)
IRON: 47 UG/DL (ref 37–145)
PROTHROMBIN TIME: 10.7 SEC (ref 10–13.2)
TOTAL IRON BINDING CAPACITY: 356 UG/DL (ref 260–445)

## 2020-03-06 PROCEDURE — 36415 COLL VENOUS BLD VENIPUNCTURE: CPT

## 2020-03-06 PROCEDURE — 83516 IMMUNOASSAY NONANTIBODY: CPT

## 2020-03-06 PROCEDURE — 82784 ASSAY IGA/IGD/IGG/IGM EACH: CPT

## 2020-03-06 PROCEDURE — 82977 ASSAY OF GGT: CPT

## 2020-03-06 PROCEDURE — 99204 OFFICE O/P NEW MOD 45 MIN: CPT | Performed by: INTERNAL MEDICINE

## 2020-03-06 PROCEDURE — 80074 ACUTE HEPATITIS PANEL: CPT

## 2020-03-06 PROCEDURE — 85610 PROTHROMBIN TIME: CPT

## 2020-03-06 PROCEDURE — 82390 ASSAY OF CERULOPLASMIN: CPT

## 2020-03-06 PROCEDURE — 82104 ALPHA-1-ANTITRYPSIN PHENO: CPT

## 2020-03-06 PROCEDURE — 82728 ASSAY OF FERRITIN: CPT

## 2020-03-06 PROCEDURE — 82103 ALPHA-1-ANTITRYPSIN TOTAL: CPT

## 2020-03-06 PROCEDURE — 83550 IRON BINDING TEST: CPT

## 2020-03-06 PROCEDURE — 83540 ASSAY OF IRON: CPT

## 2020-03-06 NOTE — PATIENT INSTRUCTIONS
[place] [id number] [facility] ,  [address] [facility]  Phone: [phone]  [facility],  [address] [place] [address], [address] [place] [address]  Phone: [phone]    Sedation  Three types of sedation are used for endoscopy and colonoscopy. The standard and most common is called conscious sedation. This is administered by the gastroenterologist and is part of the standard procedure. Common medications used for this are IV forms of a benzodiazepine (most commonly Versed) and a narcotic (most commonly fentanyl). Benadryl and nausea medicines may also be used. The effect of this is to make you comfortable. Most people will actually have amnesia with this and not recall the procedure. Some individuals will have other types of anesthesia provided by an anesthesiologist or nurse anesthetist.  The reason for this is a history of poor sedation, medication use that makes one more resistant to conscious sedation, a medical condition for which conscious sedation is contraindicated or other medical unstable conditions. This usually involves a separate fee from anesthesia. The most common of these is propofol (diprivan sedation) which is a deeper sedative the conscious sedation. In some instances, general anesthesia with intubation (breathing tube) is required. If you need to cancel or reschedule, please do so at least 2 weeks before the procedure, so that we can be considerate to other patients who are waiting to be scheduled. If you cancel or reschedule less than 7 days before your procedure, you will be placed on a lower priority list.    COLONOSCOPY OVERVIEW  A colonoscopy is an exam of the lower part of the gastrointestinal tract, which is called the colon or large intestine (bowel). Colonoscopy is a safe procedure that provides information other tests may not be able to give.  Patients who require colonoscopy often have questions and concerns about the procedure. Colonoscopy is performed by inserting a device called a colonoscope into the anus and advanced through the entire colon. The procedure generally takes between 20 minutes and one hour. Other tests that are sometimes used to screen for colon cancer, like virtual colonoscopy (also called CT colonography), are discussed separately. More detailed information about colonoscopy is available by subscription. REASONS FOR COLONOSCOPY   The most common reasons for colonoscopy are to evaluate the following:        As a screening exam for colon cancer      Rectal bleeding      A change in bowel habits, like persistent diarrhea      Iron deficiency anemia (a decrease in blood count due to loss of iron)      A family history of colon cancer      As a follow-up test in people with colon polyps or colon cancer      Chronic, unexplained abdominal or rectal pain      An abnormal X-ray exam, like a barium enema or CT scan    COLONOSCOPY PREPARATION  Before colonoscopy, your colon must be completely cleaned out so that the doctor can see any abnormal areas. To clean the colon, you will take a strong laxative and empty your bowels the night before your test.    Your doctor's office will provide specific instructions about how you should prepare for colonoscopy. Be sure to read these instructions ahead of time so you will be prepared for the prep. If you have questions, call the doctor's office in advance.     You will need to avoid solid food for at least one day before the test. You should also drink plenty of fluids on the day before the test. You can drink clear liquids up to several hours before your procedure, including:        Water      Clear broth (beef, chicken, or vegetable)      Coffee or tea (without milk)      Ices      Gelatin (avoid red gelatin)    The day or night before the colonoscopy, you will take a laxative in two parts:        A pill that you take by mouth      A powder that is mixed with water    The most common laxative treatment is called Go-Lytely® or Half-Lytely®. You can add a flavoring (included), which, unfortunately, only partially hides the unpleasant taste. Most doctors do not recommend that you add other flavorings to the solution. Refrigerating the solution can make it easier to drink. Drinking this solution may be the most unpleasant part of the exam. You will begin to have watery diarrhea within a short time after drinking the solution. If you become nauseated or vomit while drinking the solution, call your doctor or nurse for instructions. Medicines - You can take most prescription and nonprescription medicines right up to the day of the colonoscopy. Your doctor should tell you what medicines to stop. You should also tell the doctor if you are allergic to any medicines. Some medicines increase the risk of heavy bleeding if you have a biopsy during the colonoscopy. Ask your doctor how and when to stop these medicines, including warfarin/Coumadin® and clopidogrel/Plavix®. Transportation home - You will be given a sedative (a medicine to help you relax) during the colonoscopy, so you will need someone to take you home after your test. Although you will be awake by the time you go home, the sedative medicines cause changes in reflexes and judgment that can interfere with your ability to make decisions, similar to the effect of alcohol. WHAT TO EXPECT  Before the test, a doctor will review the test, including possible complications, and will ask you to sign a consent form. The nurse will start an IV line in your hand or arm. Your blood pressure and heart rate will be monitored during the test.    THE COLONOSCOPY PROCEDURE  You will be given fluid and medicines through an IV line. Many people sleep during the test, while others are very relaxed, comfortable, and generally not aware. The colonoscope is a flexible tube, approximately the size of the index finger.  The scope pumps air into the colon to inflate it and allow the doctor to see the entire lining. You might feel bloating or gas cramps as the air opens the colon. Try not to be embarrassed about passing this gas, and let your doctor know if you are uncomfortable. During the procedure, the doctor might take a biopsy (small pieces of tissue) or remove polyps. Polyps are growths of tissue that can range in size from the tip of a pen to several inches. Most polyps are benign (not cancerous). However, some polyps can become cancerous if allowed to grow for a long time. Having a polyp removed does not hurt. RECOVERY FROM COLONOSCOPY  After the colonoscopy, you will be observed in a recovery area until the effects of the sedative medication wear off. The most common complaint after colonoscopy is a feeling of bloating and gas cramps. You may also feel groggy from the sedation medications. You should not return to work or drive that day. Most people are able to eat normally after the test. Ask your doctor when it is safe to restart aspirin and other blood-thinning medications. COLONOSCOPY COMPLICATIONS  Colonoscopy is a safe procedure, and complications are rare but can occur:        Bleeding can occur from biopsies or the removal of polyps, but it is usually minimal and can be controlled. The colonoscope can cause a tear or hole (perforation) in the colon. This is a serious problem, but it does not happen commonly. It is possible to have side effects from the sedative medicines. Although colonoscopy is the best test to examine the colon, it is possible for even the most skilled doctors to miss or overlook an abnormal area in the colon.     You should call your doctor immediately if you have any of the following:        Severe abdominal pain (not just gas cramps)      A firm, bloated abdomen      Vomiting      Fever      Rectal bleeding (greater than a few tablespoons)    AFTER COLONOSCOPY  Although many people worry about being uncomfortable during a colonoscopy, most people tolerate it very well and feel fine afterward. It is normal to feel tired afterward. Plan to take it easy and relax the rest of the day. Your doctor can describe the results of the colonoscopy as soon as it is over. If s/he took biopsies or polyps, you should call for results within one to two weeks. We will make every attempt to get you your results by phone, mychart or sometimes a follow up visit, however, It is your responsibility to obtain your test results. If you do not get your results within 2 weeks, please call the office. Not all test results are available during your visit. If your test results are not back during the visit and you are still having symptoms, make an appointment with your caregiver to find out the results and any next steps. Do not assume everything is normal if you have not heard from your caregiver or the medical facility. It is important for you to follow up on all of your test results. WHERE TO GET MORE INFORMATION  Your healthcare provider is the best source of information for questions and concerns related to your medical problem. This article will be updated as needed every four months on our Web site (www.digitalbox.[employer]/patients). The following organizations also provide reliable health information. [employer] (www.nlm.nih.gov/medlineplus/colonoscopy.html)  [address] for Gastrointestinal Endoscopy  (www.asge.org/PatientInfoIndex. aspx?xy=520)

## 2020-03-06 NOTE — PROGRESS NOTES
[id number] [facility],  [address] [place] [address] Ave  [place], [address] [place] [address]  Phone: [phone]   [facility]     Chief Complaint   Patient presents with    New Patient     Elevated LFTs r/b Dr Barbara Duran       HPI     Thank you Andrade Disla MD for asking me to see Davida Phillips in consultation. She is a  [5] White [1] 59 y.o. Aneita Albert female seen independently who presents with the following GI complaints:    Davida Phillips  Presents for elevated transaminases since 2012 but increasing and now 3x elevation in January. They have improved a small amount since stopping her statin. Has not had any liver imaging. Has hypothyroidism with a near normal tsh. Plt and albumin normal.  LILIAN+ 1:40. No pertinent GI family history. Had cologuard 2yo and is due for follow up screening this year. Denies any GI symptoms. Describes new diagnosis of an unknown muscle disorder diagnosed by EMG. No pertinent GI family history. Results for Kylee Aguilar (MRN <[id number]>) as of 3/6/2020 13:58   Ref. Range 12/14/2009 10:03 3/15/2010 13:58 6/16/2010 02:42 12/17/2010 09:22 9/20/2011 09:56 3/20/2012 09:47 3/19/2013 08:16 9/17/2013 08:28 3/10/2014 08:28 9/16/2014 08:27 3/17/2015 09:10 9/15/2015 08:55 3/16/2016 09:07 8/31/2016 09:07 9/13/2017 08:13 3/21/2018 08:52 6/27/2018 07:55 10/2/2018 08:58 12/28/2018 07:49 4/3/2019 09:12 1/27/2020 08:30 2/7/2020 12:14 2/21/2020 12:09   ALT Latest Ref Range: 10 - 40 U/L 18 25 25 23 35 46 (H) 27 22 18 21 42 (H) 26 21 30 28 70 (H) 86 (H) 84 (H) 63 (H) 52 (H) 206 (H) 193 (H) 150 (H)   AST Latest Ref Range: 15 - 37 U/L 22 25 26 29 35 51 (H) 30 23 21 25 42 (H) 26 24 27 26 53 (H) 54 (H) 53 (H) 42 (H) 41 (H) 140 (H) 118 (H) 100 (H)       HPI elements: location, severity, timing, modifying factors, quality, duration, context and associated signs/symptoms. Last Encounter Reviewed:   Pertinent PMH, FH, SH is reviewed below.   Last EGD: none  Last Colonoscopy: 10yo, NA    Review of available records reveals:   Wt Readings from Last 50 Encounters:   03/06/20 178 lb (80.7 kg)   01/27/20 181 lb (82.1 kg)   10/09/19 179 lb 3.2 oz (81.3 kg)   07/24/19 180 lb 12.8 oz (82 kg)   07/03/19 182 lb 6.4 oz (82.7 kg)   04/03/19 185 lb (83.9 kg)   02/12/19 186 lb 9.6 oz (84.6 kg)   01/22/19 186 lb (84.4 kg)   01/02/19 188 lb 3.2 oz (85.4 kg)   11/14/18 187 lb 12.8 oz (85.2 kg)   10/03/18 189 lb 6.4 oz (85.9 kg)   10/02/18 191 lb (86.6 kg)   08/09/18 192 lb (87.1 kg)   06/29/18 190 lb (86.2 kg)   06/27/18 190 lb 3.2 oz (86.3 kg)   03/21/18 196 lb (88.9 kg)   02/08/18 193 lb 9.6 oz (87.8 kg)   12/20/17 197 lb (89.4 kg)   10/24/17 202 lb 8 oz (91.9 kg)   09/13/17 197 lb (89.4 kg)   09/12/17 197 lb 8 oz (89.6 kg)   08/18/17 198 lb (89.8 kg)   06/12/17 207 lb 3.2 oz (94 kg)   03/06/17 211 lb (95.7 kg)   01/18/17 202 lb (91.6 kg)   12/02/16 198 lb (89.8 kg)   08/31/16 198 lb (89.8 kg)   05/27/16 202 lb (91.6 kg)   03/16/16 204 lb (92.5 kg)   12/15/15 197 lb (89.4 kg)   09/15/15 198 lb (89.8 kg)   06/16/15 191 lb (86.6 kg)   03/17/15 195 lb (88.5 kg)   12/15/14 188 lb (85.3 kg)   09/16/14 192 lb (87.1 kg)   06/24/14 186 lb (84.4 kg)   06/16/14 186 lb (84.4 kg)   03/10/14 188 lb (85.3 kg)   12/10/13 190 lb (86.2 kg)   09/17/13 190 lb 12.8 oz (86.5 kg)   06/19/13 191 lb (86.6 kg)   03/19/13 188 lb 6.4 oz (85.5 kg)   12/17/12 187 lb 12.8 oz (85.2 kg)   09/17/12 187 lb 9.6 oz (85.1 kg)   06/20/12 180 lb 3.2 oz (81.7 kg)   03/20/12 179 lb (81.2 kg)   01/18/12 181 lb 6.4 oz (82.3 kg)   12/20/11 186 lb 3.2 oz (84.5 kg)   09/20/11 187 lb 12.8 oz (85.2 kg)   06/17/11 183 lb (83 kg)       No components found for: HGBA1C  BP Readings from Last 3 Encounters:   03/06/20 132/78   01/27/20 120/60   10/09/19 (!) 162/78     Health Maintenance   Topic Date Due    Hepatitis B vaccine (1 of 3 - Risk 3-dose series) [birth year]1974    Shingles Vaccine (2 of 3) 07/22/2016    Flu vaccine (1) 09/01/2019    Diabetic microalbuminuria test  12/28/2019    Diabetic foot exam  07/24/2020    A1C test (Diabetic or Prediabetic)  01/27/2021    Lipid screen  01/27/2021    Potassium monitoring  01/27/2021    Creatinine monitoring  01/27/2021    Breast cancer screen  10/11/2021    Diabetic retinal exam  12/10/2021    Colon cancer screen fecal DNA test (Cologuard)  02/25/2022    Pneumococcal 0-64 years Vaccine  Completed    Hepatitis C screen  Completed    HIV screen  Completed    Hepatitis A vaccine  Aged Out    Hib vaccine  Aged Out    Meningococcal (ACWY) vaccine  Aged Out       No components found for: [employer]     PAST MEDICAL HISTORY     Past Medical History:   Diagnosis Date    Asthma     Chronic kidney disease     Hyperlipidemia     Hypertension     Myotonia     Type II or unspecified type diabetes mellitus without mention of complication, not stated as uncontrolled      FAMILY HISTORY     Family History   Problem Relation Age of Onset    Diabetes Father      SOCIAL HISTORY     Social History     Socioeconomic History    Marital status:       Spouse name: Not on file    Number of children: Not on file    Years of education: Not on file    Highest education level: Not on file   Occupational History    Not on file   Social Needs    Financial resource strain: Not on file    Food insecurity:     Worry: Not on file     Inability: Not on file    Transportation needs:     Medical: Not on file     Non-medical: Not on file   Tobacco Use    Smoking status: Never Smoker    Smokeless tobacco: Never Used   Substance and Sexual Activity    Alcohol use: No    Drug use: No    Sexual activity: Not Currently   Lifestyle    Physical activity:     Days per week: Not on file     Minutes per session: Not on file    Stress: Not on file   Relationships    Social connections:     Talks on phone: Not on file     Gets together: Not on file     Attends Voodoo service: Not on file     Active member of club or organization: Not on file     Attends meetings of clubs or organizations: Not on file     Relationship status: Not on file    Intimate partner violence:     Fear of current or ex partner: Not on file     Emotionally abused: Not on file     Physically abused: Not on file     Forced sexual activity: Not on file   Other Topics Concern    Not on file   Social History Narrative    Not on file     SURGICAL HISTORY     Past Surgical History:   Procedure Laterality Date    HYSTERECTOMY      [id number]    cervical     CURRENT MEDICATIONS   (This list may include medications prescribed during this encounter as epic can not insert only the list prior to this encounter.)  Current Outpatient Rx   Medication Sig Dispense Refill    hydrochlorothiazide (HYDRODIURIL) 25 MG tablet TAKE 1 TABLET BY MOUTH DAILY 30 tablet 5    Cyanocobalamin (B-12) 100 MCG TABS       simvastatin (ZOCOR) 40 MG tablet TAKE 1 TABLET BY MOUTH EVERY EVENING 90 tablet 1    metFORMIN (GLUCOPHAGE) 1000 MG tablet TAKE 1 TABLET BY MOUTH TWICE DAILY WITH MEALS 180 tablet 1    amLODIPine (NORVASC) 5 MG tablet TAKE 1 TABLET BY MOUTH DAILY 90 tablet 1    levothyroxine (SYNTHROID) 50 MCG tablet Take 1 tablet by mouth Daily 30 tablet 3    acetaminophen (TYLENOL) 500 MG tablet Take 500 mg by mouth every 6 hours as needed for Pain      Insulin Degludec (TRESIBA FLEXTOUCH) 200 UNIT/ML SOPN Inject 50 Units into the skin nightly 3 pen 3    Dulaglutide (TRULICITY) 1.5 ZAMBRANO/4.8TR SOPN Inject 1.5 mg into the skin once a week 4 pen 5    carvedilol (COREG) 25 MG tablet TAKE 1 TABLET BY MOUTH TWICE DAILY 60 tablet 11    vitamin D (CHOLECALCIFEROL) 1000 UNIT TABS tablet Take 1,000 Units by mouth daily      insulin lispro (HUMALOG KWIKPEN) 100 UNIT/ML pen Inject 5-20 Units into the skin 3 times daily (before meals) 1 pen 5    Multiple Vitamins-Minerals (MULTIVITAMIN WOMEN) TABS Take by mouth daily      Magnesium 400 MG TABS Take by mouth nightly      cetirizine (ZYRTEC) 10 MG tablet Take 10 mg by mouth daily      CINNAMON PO Take by mouth       ALLERGIES     Allergies   Allergen Reactions    Norvasc [Amlodipine Besylate] Other (See Comments)     edema    Proventil [Albuterol Sulfate] Other (See Comments)     Paralyzed vocal cords    Tetanus Toxoids      IMMUNIZATIONS     Immunization History   Administered Date(s) Administered    Influenza Virus Vaccine 09/16/2014, 09/15/2015    Influenza, Quadv, IM, PF (6 mo and older Fluzone, Flulaval, Fluarix, and 3 yrs and older Afluria) 12/02/2016, 09/13/2017, 10/02/2018    Pneumococcal Polysaccharide ([id number]) 02/01/2008, 09/15/2015    Zoster Live (Zostavax) 05/27/2016     REVIEW OF SYSTEMS   See HPI for further details and pertinent postiives. Negative for the following:  Constitutional: Negative for weight change. Negative for appetite change and fatigue. HENT: Negative for nosebleeds, sore throat, mouth sores, and voice change. Respiratory: Negative for cough, choking and chest tightness. Cardiovascular: Negative for chest pain   Gastrointestinal: See HPI  Musculoskeletal: Negative for arthralgias. Skin: Negative for pallor. Neurological: Negative for weakness and light-headedness. Hematological: Negative for adenopathy. Does not bruise/bleed easily. Psychiatric/Behavioral: Negative for suicidal ideas. PHYSICAL EXAM   VITAL SIGNS: /78 (Site: Right Upper Arm, Position: Sitting, Cuff Size: Small Adult)   Ht 5' 2\" (1.575 m)   Wt 178 lb (80.7 kg)   BMI 32.56 kg/m²   Wt Readings from Last 3 Encounters:   03/06/20 178 lb (80.7 kg)   01/27/20 181 lb (82.1 kg)   10/09/19 179 lb 3.2 oz (81.3 kg)     Constitutional: Well developed, Well nourished, No acute distress, Non-toxic appearance. HENT: Normocephalic, Atraumatic, Bilateral external ears normal, Oropharynx moist, No oral exudates, Nose normal.   Eyes: Conjunctiva normal, No discharge.    Neck: Normal range of motion, No tenderness, Supple, No stridor. Lymphatic: No cervical, subclavian, or axillary lymphadenopathy. Cardiovascular: Normal heart rate, Normal rhythm, No murmurs, No rubs, No gallops. Thorax & Lungs: Normal breath sounds, No respiratory distress, No wheezing, No chest tenderness. No gynecomastia. Abdomen: scars consistent with stated surgeries, no hernias, no HSM, soft NTND   Rectal:  Deferred. Skin: Warm, Dry, No erythema, No rash. No bruising. No spider hemangiomas. Back: No tenderness, No CVA tenderness. Lower Extremities: Intact distal pulses, No edema, No tenderness, No cyanosis, No clubbing. Neurologic: Alert & oriented x 3, Normal motor function, Normal sensory function, No focal deficits noted. No asterixis. RADIOLOGY/PROCEDURES         FINAL IMPRESSION     Orders Placed This Encounter   Procedures    US Gallbladder Ruq     Standing Status:   Future     Standing Expiration Date:   3/6/2021    Mitochondrial antibodies, M2     Standing Status:   Future     Standing Expiration Date:   4/6/2020    Ceruloplasmin     Standing Status:   Future     Standing Expiration Date:   4/6/2020    F-actin (smooth muscle) antibody w/ reflex to titer     Standing Status:   Future     Standing Expiration Date:   4/6/2020    IgA     Standing Status:   Future     Standing Expiration Date:   4/6/2020    Tissue Transglutaminase, IgA     Standing Status:   Future     Standing Expiration Date:   4/6/2020    Hepatitis Panel, Acute     Standing Status:   Future     Standing Expiration Date:   4/6/2020    Protime-INR     Standing Status:   Future     Standing Expiration Date:   4/6/2020     Order Specific Question:   Daily Coumadin Dose?      Answer:   none-elevated lft's    Alpha-1-Antitrypsin w Phenotype     Standing Status:   Future     Standing Expiration Date:   4/6/2020    Gamma GT     Standing Status:   Future     Standing Expiration Date:   4/6/2020    Iron and TIBC     Standing Status:   Future     Standing Expiration liver diseases more then 50% risk. For this reason, the general recommendation of weight loss (if overweight) and moderate to heavy alcohol avoidance are universal recommendations with elevated lft's. ORDERED FUTURE/PENDING TESTS     Lab Frequency Next Occurrence       FOLLOWUP   Return for test results per phone/Antonia briggs.           Vinh Talley 3/6/20 1:57 PM    CC:  Spencer Peacock MD

## 2020-03-08 LAB
F-ACTIN AB IGG: 14 UNITS (ref 0–19)
MITOCHONDRIAL M2 AB, IGG: 3.2 UNITS (ref 0–20)
TISSUE TRANSGLUTAMINASE IGA: <0.5 U/ML (ref 0–14)

## 2020-03-09 NOTE — RESULT ENCOUNTER NOTE
Please call patient with normal results. Remind about ability to get results, make appointments, and communicate with us through [employer] since not signed up. Still awaiting a few labs.

## 2020-03-10 LAB
ALPHA-1 ANTITRYPSIN PHENOTYPE: NORMAL
ALPHA-1 ANTITRYPSIN: 145 MG/DL (ref 90–200)
CERULOPLASMIN: 26 MG/DL (ref 16–45)

## 2020-03-13 ENCOUNTER — HOSPITAL ENCOUNTER (OUTPATIENT)
Dept: ULTRASOUND IMAGING | Age: 65
Discharge: HOME | End: 2020-03-13
Payer: COMMERCIAL

## 2020-03-13 PROCEDURE — 76705 ECHO EXAM OF ABDOMEN: CPT

## 2020-03-16 NOTE — RESULT ENCOUNTER NOTE
Please call patient with normal results. Remind about ability to get results, make appointments, and communicate with us through [employer] since not signed up.

## 2020-03-16 NOTE — RESULT ENCOUNTER NOTE
Please call patient with normal results. Remind about ability to get results, make appointments, and communicate with us through [employer] since not signed up. Recommend repeat lft's along with ggt. If has a muscle disorder ggt will be normal but could cause the lft elevation.

## 2020-03-19 ENCOUNTER — TELEPHONE (OUTPATIENT)
Dept: GASTROENTEROLOGY | Age: 65
End: 2020-03-19

## 2020-04-09 RX ORDER — INSULIN LISPRO 100 [IU]/ML
INJECTION, SOLUTION INTRAVENOUS; SUBCUTANEOUS
Qty: 15 ML | Refills: 5 | Status: SHIPPED | OUTPATIENT
Start: 2020-04-09 | End: 2021-06-25

## 2020-04-09 NOTE — TELEPHONE ENCOUNTER
Trell Brito is requesting refill(s) humalog   Last OV 1/27/20 (pertaining to medication)  LR 10/2/18 (per medication requested)  Next office visit scheduled or attempted Yes   If no, reason:  7/27/20

## 2020-04-10 RX ORDER — CARVEDILOL 25 MG/1
TABLET ORAL
Qty: 180 TABLET | Refills: 1 | OUTPATIENT
Start: 2020-04-10

## 2020-04-10 RX ORDER — AMLODIPINE BESYLATE 5 MG/1
5 TABLET ORAL DAILY
Qty: 90 TABLET | Refills: 1 | OUTPATIENT
Start: 2020-04-10

## 2020-04-13 RX ORDER — ATORVASTATIN CALCIUM 40 MG/1
40 TABLET, FILM COATED ORAL DAILY
Qty: 90 TABLET | Refills: 1 | Status: SHIPPED | OUTPATIENT
Start: 2020-04-13 | End: 2020-07-28 | Stop reason: SINTOL

## 2020-04-13 RX ORDER — SIMVASTATIN 40 MG
TABLET ORAL
Qty: 90 TABLET | Refills: 1 | OUTPATIENT
Start: 2020-04-13

## 2020-04-13 RX ORDER — ATORVASTATIN CALCIUM 20 MG/1
20 TABLET, FILM COATED ORAL DAILY
Qty: 90 TABLET | Refills: 1 | Status: CANCELLED | OUTPATIENT
Start: 2020-04-13

## 2020-04-14 RX ORDER — AMLODIPINE BESYLATE 5 MG/1
5 TABLET ORAL DAILY
Qty: 90 TABLET | Refills: 1 | Status: SHIPPED | OUTPATIENT
Start: 2020-04-14 | End: 2020-10-23

## 2020-04-14 RX ORDER — CARVEDILOL 25 MG/1
TABLET ORAL
Qty: 180 TABLET | Refills: 1 | Status: SHIPPED | OUTPATIENT
Start: 2020-04-14 | End: 2020-10-23

## 2020-04-22 ENCOUNTER — VIRTUAL VISIT (OUTPATIENT)
Dept: ENDOCRINOLOGY | Age: 65
End: 2020-04-22
Payer: COMMERCIAL

## 2020-04-22 PROCEDURE — 99214 OFFICE O/P EST MOD 30 MIN: CPT | Performed by: NURSE PRACTITIONER

## 2020-04-22 RX ORDER — ERTUGLIFLOZIN 15 MG/1
1 TABLET, FILM COATED ORAL DAILY
Qty: 30 TABLET | Refills: 11 | Status: SHIPPED | OUTPATIENT
Start: 2020-04-22 | End: 2021-04-29 | Stop reason: SDUPTHER

## 2020-04-22 ASSESSMENT — ENCOUNTER SYMPTOMS
COLOR CHANGE: 0
NAUSEA: 0
SHORTNESS OF BREATH: 0
EYE PAIN: 0
DIARRHEA: 0
CONSTIPATION: 0

## 2020-04-22 NOTE — ASSESSMENT & PLAN NOTE
Stopped medication approx 3 months ago  Will recheck labs   Denies symptoms, does not want to go back on levothyroxine for s/e

## 2020-04-22 NOTE — PROGRESS NOTES
Endocrinology  Doreen Prescott, [facility], [address] [facility] [address] [facility], [address]  Phone [phone]  Fax [phone]    Services were provided over a phone visit to substitute for in-person clinic visit. Patient provided consent prior to the visit. Tomas De Santiago is a 59 y.o. female who is following up for management of Diabetes Mellitus Type 2. Last A1C:   Lab Results   Component Value Date    LABA1C 6.5 01/27/2020    LABA1C 6.4 10/09/2019    LABA1C 7.6 07/03/2019     Last BP Readings:   BP Readings from Last 3 Encounters:   03/06/20 132/78   01/27/20 120/60   10/09/19 (!) 162/78     Last LDL:   Lab Results   Component Value Date    LDLCALC 50 01/27/2020    LDLDIRECT 87 04/03/2019     Aspirin Use: no    Tobacco History:    Smoking status: Never Smoker    Smokeless tobacco: Never Used    Alcohol use No     Diabetes:  Tomas De Santiago  has been diabetic since 1996 and on insulin for 2 years. Patient reports that diabetes is generally not well controlled. Disease course has been stable but uncontrolled. Patient reports compliance for about 80% of the time and adheres to medication, but usually not to diet and exercise instructions. There have been no recent hospital or ED admissions for hypoglycemia, hyperglycemia or DKA. Has had 3 back surgeries:  Last in 2002, chronic back pain, sciatica. DMITRIY in 1996 when she was 41. Was on HRT for 10 years ago  Neck surgery (C5 and C6 ) in 1995    Patient brought no or glucometer at this visit  Readings per day  4 per patient report--> now checking 2 x daily per patient: in morning and after dinner  Average reading 150  Lowest in past 2 weeks 98--> 96 ( usually low 100s)  Highest in past 2 weeks 202--> 176  Hypoglycemia awareness and symptoms: no--> last week and ok after drinking OJ.  Did not check BG  CGM done  Usual carbs per meal:does not count carbs    HPI on 4/22/2020  Reports doing well  Has not had falls in 4 months  BG is 120-130 fasting; checks approx 1 hour > dinner in 160-170s  Stopped taking levothyroxine as could not get refill per patient  Could not get [place] rx filled      HPI on 10/9/19  Doing well; appetite suppression  Does low carb options; tolerates medication ok  Reports significant fatigue; occasional falls. Unable to bowl due to fatigue    Lab Results   Component Value Date    .6 12/28/2018    .4 06/27/2018    .4 03/06/2017     Current Medication regimen:   Farxiga 10 mg  --> stopped                                                  Tresiba 30 units -- has not titrated--> 24 units  Humalog 5-20 U AC TID--> 150-200 : 10 units, 2:50 > 150: typically doses BID with 10 units   Metformin 9548 mg BID  Trulicity 0.90 mg   GFR > 60    Diet:  Typically eat one meal a day: vegetables, meat, rice, soups (cream of potato). --> has improved diet and decreased overall carb intake  Coffee with no sugar, lots of water, lite beer usually 1/day  Reports financial constraints that prevent making better food choices    Microvascular Complications:  · Neuropathy denies concerns  · Retinopathy no concerns with vision, field of vision  · Nephropathy  Component    Latest Ref Rng & Units 6/27/2018   Microalbumin, Random Urine    <2.0 mg/dL <1.20   Creatinine, Ur    28.0 - 259.0 mg/dL 75.1   Microalbumin Creatinine Ratio    0.0 - 30.0 mg/g see below     Diabetic Health Maintenance   · Last Eye Exam: 11/2018--> next visit in 12/2019  · Last Foot exam: at visit  · Has patient seen a dietitian? Yes  · Current Exercise: No structured exercise  · On ACEI or ARB: Irbesartan 300 mg  · On statin: Simvastatin 40 mg    Risk Factors  · Smoker: no  · ETOH:no  · Co-morbid conditions: HTN, HLD, Obesity, CKD    Hyperlipidemia: Currently is on Simvastatin 40 mg. Current complaints include occasional myalgias but otherwise tolerates well.   Lab Results   Component Value Date    CHOL 148 01/27/2020    CHOL 182 04/03/2019    CHOL 158 12/28/2018 Lab Results   Component Value Date    TRIG 249 01/27/2020    TRIG 384 04/03/2019    TRIG 271 12/28/2018     Lab Results   Component Value Date    HDL 48 01/27/2020    HDL 42 04/03/2019    HDL 42 12/28/2018    HDL 47 03/20/2012    HDL 59 09/20/2011    HDL 49 12/17/2010     Lab Results   Component Value Date    LDLCALC 50 01/27/2020    LDLCALC see below 04/03/2019    LDLCALC 62 12/28/2018     Lab Results   Component Value Date    LDLDIRECT 87 04/03/2019    LDLDIRECT 66 10/02/2018    LDLDIRECT 127 09/17/2013     No results found for: CHOLHDLRATIO    Vitamin D deficiency: Currently is on no supplementation. Current complaints include fatigue on daily basis. Last vitamin D level is:  Lab Results   Component Value Date    VITD25 23.3 10/18/2019    VITD25 28.9 12/28/2018       Hypertension  Currently on Irbesartan 300 mg, HCTZ 25 mg. Norvasc 5 mg. BOP elevated at visit, denies symptoms of dizziness, light headedness. Occasional dependent edema. Tries to follow a salt restricted diet.    Lab Results   Component Value Date     01/27/2020    K 4.7 01/27/2020     01/27/2020    CO2 26 01/27/2020    BUN 32 (H) 01/27/2020    CREATININE 0.7 01/27/2020    GLUCOSE 120 (H) 01/27/2020    CALCIUM 9.5 01/27/2020    PROT 6.7 02/21/2020    LABALBU 3.9 02/21/2020    BILITOT <0.2 02/21/2020    ALKPHOS 60 02/21/2020     (H) 02/21/2020     (H) 02/21/2020    LABGLOM >60 01/27/2020    GFRAA >60 01/27/2020    AGRATIO 1.6 01/27/2020    GLOB 2.4 01/27/2020       The 10-year ASCVD risk score (Veto Villa et al., 2013) is: 12%    Values used to calculate the score:      Age: 59 years      Sex: Female      Is Non- : No      Diabetic: Yes      Tobacco smoker: No      Systolic Blood Pressure: 826 mmHg      Is BP treated: Yes      HDL Cholesterol: 48 mg/dL      Total Cholesterol: 148 mg/dL    Past Medical History:   Diagnosis Date    Asthma     Chronic kidney disease     Hyperlipidemia     Hypertension     Myotonia     Type II or unspecified type diabetes mellitus without mention of complication, not stated as uncontrolled      Family History   Problem Relation Age of Onset    Diabetes Father      Review of Systems   Constitutional: Negative for activity change, appetite change, diaphoresis, fever and unexpected weight change. HENT: Negative for dental problem. Eyes: Negative for pain and visual disturbance. Respiratory: Negative for shortness of breath. Cardiovascular: Negative for chest pain, palpitations and leg swelling. Gastrointestinal: Negative for constipation, diarrhea and nausea. Endocrine: Negative for cold intolerance, heat intolerance, polydipsia, polyphagia and polyuria. Genitourinary: Negative for frequency and urgency. Musculoskeletal: Negative for arthralgias, joint swelling and myalgias. Skin: Negative for color change and pallor. Neurological: Negative for weakness, numbness and headaches. Psychiatric/Behavioral: Negative for dysphoric mood and sleep disturbance. The patient is not nervous/anxious. There were no vitals filed for this visit.  televisit    Physical Exam   televisit    Skeletal foot exam: defered patient request, foot exam at PCP yesterday        Diabetes Continuous Glucose Monitoring Report      Reason for Study:   - Poorly controlled DM without success with standard interventions   - Glucose variability      Current Therapy:   Farxiga 5 mg                                                    Tresiba 30 units   Humalog 5-20 U AC TID--> 150-200 : 5 units, 3:50 > 200  Metformin 1000 mg BID     CGMS Report   CGMS Device: Freestyle Danya Pro  Data collection from 10/3/18 to 10/10/18  Range set @   Average reading 128 mg/dL   Above target range 6%  In target range 92%  Below target range 2%      Impression:   Postprandial hyperglycemia at lunch and dinner  Occasional nocturnal hypoglycemia     Recommendation:   Repeat A1c at visit today: not done, most recent is 10.2 (A1c Goal < 7% )  Insulin Change: increase Tresiba to 36 units  Dietary Recommendations: reinforced low processed carb diet, ok to ensure good fats and moderate proteins  Repeat IPRO: in 6 months or PRN      Assessment  Trell Brito is a 61 y.o. female with uncontrolled diabetes mellitus type 2 associated with HTN, HLD and obesity and Vitamin D deficiency    Plan  Problem List Items Addressed This Visit     Type 2 diabetes mellitus ([address]     Lab Results   Component Value Date    LABA1C 6.5 01/27/2020     Goal is < 6.5%  Was unable to get [place], will trial Steglatro with coupon  Rx for 15 mg sent, will start with 1/2 tab I.e. 7.5 mg    No change in other diabetic medications at this time  Check before meal blood sugars, and titrate down on humalog  Goal is to decrease/eliminate humalog and simplify management protocol         Relevant Medications    Ertugliflozin L-PyroglutamicAc (STEGLATRO) 15 MG TABS    Other Relevant Orders    Hemoglobin A1C    Vitamin D deficiency - Primary    Relevant Orders    Vitamin D 25 Hydroxy    High serum thyroid stimulating hormone (TSH)     Stopped medication approx 3 months ago  Will recheck labs   Denies symptoms, does not want to go back on levothyroxine for s/e         Relevant Orders    T4, Free    TSH without Reflex    T3, Free      Greater than 30 minutes spent directly counseling patient about topics listed above (such as lifestyle modifications, preventative screenings and/or disease related processes). No follow-ups on file.

## 2020-05-01 DIAGNOSIS — E55.9 VITAMIN D DEFICIENCY: ICD-10-CM

## 2020-05-01 DIAGNOSIS — Z79.4 TYPE 2 DIABETES MELLITUS WITH DIABETIC NEPHROPATHY, WITH LONG-TERM CURRENT USE OF INSULIN (HCC): ICD-10-CM

## 2020-05-01 DIAGNOSIS — R79.89 HIGH SERUM THYROID STIMULATING HORMONE (TSH): ICD-10-CM

## 2020-05-01 DIAGNOSIS — E11.21 TYPE 2 DIABETES MELLITUS WITH DIABETIC NEPHROPATHY, WITH LONG-TERM CURRENT USE OF INSULIN (HCC): ICD-10-CM

## 2020-05-01 LAB
ALBUMIN SERPL-MCNC: 3.9 G/DL (ref 3.4–5)
ALP BLD-CCNC: 198 U/L (ref 40–129)
ALT SERPL-CCNC: 294 U/L (ref 10–40)
AST SERPL-CCNC: 209 U/L (ref 15–37)
BILIRUB SERPL-MCNC: 0.3 MG/DL (ref 0–1)
BILIRUBIN DIRECT: <0.2 MG/DL (ref 0–0.3)
BILIRUBIN, INDIRECT: ABNORMAL MG/DL (ref 0–1)
T3 FREE: 3.1 PG/ML (ref 2.3–4.2)
T4 FREE: 1.5 NG/DL (ref 0.9–1.8)
TOTAL PROTEIN: 6.6 G/DL (ref 6.4–8.2)
TSH SERPL DL<=0.05 MIU/L-ACNC: 14.39 UIU/ML (ref 0.27–4.2)
VITAMIN D 25-HYDROXY: 33.3 NG/ML

## 2020-05-02 LAB
ESTIMATED AVERAGE GLUCOSE: 142.7 MG/DL
HBA1C MFR BLD: 6.6 %

## 2020-05-15 ENCOUNTER — TELEPHONE (OUTPATIENT)
Dept: GASTROENTEROLOGY | Age: 65
End: 2020-05-15

## 2020-05-15 NOTE — TELEPHONE ENCOUNTER
Patient calling to get clarification on whether or not she should be getting a CT w contrast or a CT liver bx? Per [facility] lab     Notes recorded by Patricia Kendall MD on 5/6/2020 at 2:21 PM EDT  Please call patient with persistent abnormal results. I don't see the GGT but given they are actually increasing and US was normal, I recommend a CT guided liver biopsy (ordered) followed by a VV follow up. Carmen Dickerson also need BMP prior to getting contrast for any CT (ordered).

## 2020-05-19 NOTE — TELEPHONE ENCOUNTER
Rec CT guided liver biopsy with aukerman, mclafferty, mittal, obert, phalen, or debby only followed by office visit 1-2 weeks after.

## 2020-06-02 ENCOUNTER — TELEPHONE (OUTPATIENT)
Dept: GASTROENTEROLOGY | Age: 65
End: 2020-06-02

## 2020-06-02 NOTE — TELEPHONE ENCOUNTER
Received call from radiology stating that since pt is having sedation with her liver bx that she will need to be tested for COVID-19.    [phone] (home) [phone] (work)    Left messages on both home and mobile asking pt to call back as pt needs tested today.

## 2020-06-02 NOTE — TELEPHONE ENCOUNTER
Called and spoke with Dany Back @ [facility] Scheduling to cancel pt's liver bx. Pt's appt was cancelled.

## 2020-06-04 RX ORDER — LEVOTHYROXINE SODIUM 0.03 MG/1
25 TABLET ORAL DAILY
Qty: 30 TABLET | Refills: 3 | OUTPATIENT
Start: 2020-06-04

## 2020-06-15 RX ORDER — DULAGLUTIDE 1.5 MG/.5ML
INJECTION, SOLUTION SUBCUTANEOUS
Qty: 2 ML | Refills: 2 | Status: SHIPPED | OUTPATIENT
Start: 2020-06-15 | End: 2020-11-25 | Stop reason: SDUPTHER

## 2020-07-10 RX ORDER — INSULIN DEGLUDEC 200 U/ML
INJECTION, SOLUTION SUBCUTANEOUS
Qty: 9 ML | Refills: 2 | Status: SHIPPED | OUTPATIENT
Start: 2020-07-10 | End: 2021-04-28 | Stop reason: SDUPTHER

## 2020-07-10 NOTE — TELEPHONE ENCOUNTER
Medication:   Requested Prescriptions     Pending Prescriptions Disp Refills    TRESIBA FLEXTOUCH 200 UNIT/ML SOPN [Pharmacy Med Name: Violetta Angi PEN(U-200)INJ 3ML] 9 mL      Sig: INJECT 50 UNITS UNDER THE SKIN NIGHTLY       Last appt: 04/22/2020  Next appt: 7/22/2020    Last OARRS: No flowsheet data found.

## 2020-07-22 ENCOUNTER — OFFICE VISIT (OUTPATIENT)
Dept: ENDOCRINOLOGY | Age: 65
End: 2020-07-22
Payer: COMMERCIAL

## 2020-07-22 VITALS
DIASTOLIC BLOOD PRESSURE: 76 MMHG | WEIGHT: 182.4 LBS | TEMPERATURE: 97.5 F | RESPIRATION RATE: 14 BRPM | HEART RATE: 80 BPM | SYSTOLIC BLOOD PRESSURE: 121 MMHG | HEIGHT: 62 IN | BODY MASS INDEX: 33.57 KG/M2 | OXYGEN SATURATION: 100 %

## 2020-07-22 PROCEDURE — 99214 OFFICE O/P EST MOD 30 MIN: CPT | Performed by: NURSE PRACTITIONER

## 2020-07-22 PROCEDURE — 83036 HEMOGLOBIN GLYCOSYLATED A1C: CPT | Performed by: NURSE PRACTITIONER

## 2020-07-22 RX ORDER — LEVOTHYROXINE SODIUM 50 MCG
50 TABLET ORAL DAILY
Qty: 30 TABLET | Refills: 1 | Status: SHIPPED | OUTPATIENT
Start: 2020-07-22 | End: 2020-09-03 | Stop reason: SDUPTHER

## 2020-07-22 ASSESSMENT — ENCOUNTER SYMPTOMS
DIARRHEA: 0
SHORTNESS OF BREATH: 0
COLOR CHANGE: 0
EYE PAIN: 0
CONSTIPATION: 0
NAUSEA: 0

## 2020-07-22 NOTE — PROGRESS NOTES
Endocrinology  Lashanda Green, CNP, [address]  [place], [address]  Phone [phone]  Fax [phone]      Pretty Varela is a 72 y.o. female who is following up for management of Diabetes Mellitus Type 2. Last A1C:   Lab Results   Component Value Date    LABA1C 6.6 05/01/2020    LABA1C 6.5 01/27/2020    LABA1C 6.4 10/09/2019     Last BP Readings:   BP Readings from Last 3 Encounters:   07/22/20 121/76   03/06/20 132/78   01/27/20 120/60     Last LDL:   Lab Results   Component Value Date    LDLCALC 50 01/27/2020    LDLDIRECT 87 04/03/2019     Aspirin Use: no    Tobacco History:    Smoking status: Never Smoker    Smokeless tobacco: Never Used    Alcohol use No     Diabetes:  Pretty Varela  has been diabetic since 1996 and on insulin for 2 years. Patient reports that diabetes is generally not well controlled. Disease course has been stable but uncontrolled. Patient reports compliance for about 80% of the time and adheres to medication, but usually not to diet and exercise instructions. There have been no recent hospital or ED admissions for hypoglycemia, hyperglycemia or DKA. Has had 3 back surgeries:  Last in 2002, chronic back pain, sciatica. DMITRIY in 1996 when she was 41. Was on HRT for 10 years ago  Neck surgery (C5 and C6 ) in 1995    Patient brought no or glucometer at this visit  Readings per day  4 per patient report--> now checking 2 x daily per patient: in morning and after dinner  Average reading 150  Lowest in past 2 weeks 98--> 96 ( usually low 100s)  Highest in past 2 weeks 202--> 176  Hypoglycemia awareness and symptoms: no--> last week and ok after drinking OJ. Did not check BG  CGM done  Usual carbs per meal:does not count carbs    HPI on 7/22/2020  A1c is maintained @ 6.6%  BG is 90 -120  90 day average was 136  TSH significantly elevated.  Patient stopped levothyroxine per self  Liver enzymes very elevated, followed by GI  Myotonia       HPI on 4/22/2020  Reports doing well  Has not had falls in 4 months  BG is 120-130 fasting; checks approx 1 hour > dinner in 160-170s  Stopped taking levothyroxine as could not get refill per patient  Could not get [place] rx filled      HPI on 10/9/19  Doing well; appetite suppression  Does low carb options; tolerates medication ok  Reports significant fatigue; occasional falls. Unable to bowl due to fatigue    Lab Results   Component Value Date    .7 05/01/2020    .6 12/28/2018    .4 06/27/2018     Current Medication regimen:   Steglatro 15 mg QD ( akes 1/2 tab frequently)                                             Tresiba 30 units -- has not titrated--> 24 units  Humalog 5-20 U AC TID--> 150-200 : 10 units, 2:50 > 150: typically doses BID with 10 units --> takes QD with dinner only now  Metformin 2153 mg BID  Trulicity 0.86 mg --> 1.5 mg  GFR > 60    Diet:  Typically eat one meal a day: vegetables, meat, rice, soups (cream of potato). --> has improved diet and decreased overall carb intake  Coffee with no sugar, lots of water, lite beer usually 1/day  Reports financial constraints that prevent making better food choices    Microvascular Complications:  · Neuropathy denies concerns  · Retinopathy no concerns with vision, field of vision  · Nephropathy  Component    Latest Ref Rng & Units 6/27/2018   Microalbumin, Random Urine    <2.0 mg/dL <1.20   Creatinine, Ur    28.0 - 259.0 mg/dL 75.1   Microalbumin Creatinine Ratio    0.0 - 30.0 mg/g see below     Diabetic Health Maintenance   · Last Eye Exam: 11/2018--> next visit in 12/2019  · Last Foot exam: at visit  · Has patient seen a dietitian?  Yes  · Current Exercise: No structured exercise  · On ACEI or ARB: Irbesartan 300 mg  · On statin: Simvastatin 40 mg    Risk Factors  · Smoker: no  · ETOH:no  · Co-morbid conditions: HTN, HLD, Obesity, CKD    Hypothyroidism  Stopped levothryoxine 50 mcg per self  States it made her fall, however she has myotonia and has imbalance issues prior to this  Discussed latest results  Patient denies denies fatigue, weight changes, heat/cold intolerance, bowel/skin changes or CVS symptoms. Lab Results   Component Value Date    TSH 14.39 05/01/2020    TSH 4.02 10/18/2019    TSH 6.39 09/13/2019    FT3 3.1 05/01/2020    FT3 2.6 07/03/2019    T4FREE 1.5 05/01/2020    T4FREE 1.4 10/18/2019    T4FREE 1.5 09/13/2019       Hyperlipidemia: Currently is on Simvastatin 40 mg. Current complaints include occasional myalgias but otherwise tolerates well. Lab Results   Component Value Date    CHOL 148 01/27/2020    CHOL 182 04/03/2019    CHOL 158 12/28/2018     Lab Results   Component Value Date    TRIG 249 01/27/2020    TRIG 384 04/03/2019    TRIG 271 12/28/2018     Lab Results   Component Value Date    HDL 48 01/27/2020    HDL 42 04/03/2019    HDL 42 12/28/2018    HDL 47 03/20/2012    HDL 59 09/20/2011    HDL 49 12/17/2010     Lab Results   Component Value Date    LDLCALC 50 01/27/2020    LDLCALC see below 04/03/2019    LDLCALC 62 12/28/2018     Lab Results   Component Value Date    LDLDIRECT 87 04/03/2019    LDLDIRECT 66 10/02/2018    LDLDIRECT 127 09/17/2013     No results found for: CHOLHDLRATIO    Vitamin D deficiency: Currently is on no supplementation. Current complaints include fatigue on daily basis. Last vitamin D level is:  Lab Results   Component Value Date    VITD25 33.3 05/01/2020    VITD25 23.3 10/18/2019    VITD25 28.9 12/28/2018       Hypertension  Currently on Irbesartan 300 mg, HCTZ 25 mg. Norvasc 5 mg. BOP elevated at visit, denies symptoms of dizziness, light headedness. Occasional dependent edema. Tries to follow a salt restricted diet.    Lab Results   Component Value Date     01/27/2020    K 4.7 01/27/2020     01/27/2020    CO2 26 01/27/2020    BUN 32 (H) 01/27/2020    CREATININE 0.7 01/27/2020    GLUCOSE 120 (H) 01/27/2020    CALCIUM 9.5 01/27/2020    PROT 6.6 05/01/2020    LABALBU 3.9 05/01/2020 BILITOT 0.3 05/01/2020    ALKPHOS 198 (H) 05/01/2020     (H) 05/01/2020     (H) 05/01/2020    LABGLOM >60 01/27/2020    GFRAA >60 01/27/2020    AGRATIO 1.6 01/27/2020    GLOB 2.4 01/27/2020       The 10-year ASCVD risk score (Isabell Villaseñor et al., 2013) is: 11.4%    Values used to calculate the score:      Age: 72 years      Sex: Female      Is Non- : No      Diabetic: Yes      Tobacco smoker: No      Systolic Blood Pressure: 952 mmHg      Is BP treated: Yes      HDL Cholesterol: 48 mg/dL      Total Cholesterol: 148 mg/dL    Past Medical History:   Diagnosis Date    Asthma     Chronic kidney disease     Hyperlipidemia     Hypertension     Myotonia     Type II or unspecified type diabetes mellitus without mention of complication, not stated as uncontrolled      Family History   Problem Relation Age of Onset    Diabetes Father      Review of Systems   Constitutional: Negative for activity change, appetite change, diaphoresis, fever and unexpected weight change. HENT: Negative for dental problem. Eyes: Negative for pain and visual disturbance. Respiratory: Negative for shortness of breath. Cardiovascular: Negative for chest pain, palpitations and leg swelling. Gastrointestinal: Negative for constipation, diarrhea and nausea. Endocrine: Negative for cold intolerance, heat intolerance, polydipsia, polyphagia and polyuria. Genitourinary: Negative for frequency and urgency. Musculoskeletal: Negative for arthralgias, joint swelling and myalgias. Skin: Negative for color change and pallor. Neurological: Negative for weakness, numbness and headaches. Psychiatric/Behavioral: Negative for dysphoric mood and sleep disturbance. The patient is not nervous/anxious.       Vitals:    07/22/20 0809   BP: 121/76   Pulse: 80   Resp: 14   Temp: 97.5 °F (36.4 °C)   TempSrc: Oral   SpO2: 100%   Weight: 182 lb 6.4 oz (82.7 kg)   Height: 5' 2\" (1.575 m) televisit    Physical Exam   Constitutional: She is oriented to person, place, and time. She appears well-developed and well-nourished. Eyes: Pupils are equal, round, and reactive to light. Neck: Normal range of motion. No thyromegaly present. Cardiovascular: Normal rate, regular rhythm and normal heart sounds. Pulmonary/Chest: Effort normal and breath sounds normal.   Abdominal: She exhibits no distension. Musculoskeletal: Normal range of motion. General: No edema. Neurological: She is alert and oriented to person, place, and time. No sensory deficit. Vibration Sense 10/10 as compared to UE  Pedal Pulses 2+   Skin: Skin is warm and dry. No skin changes or evidence of trauma. Psychiatric: She has a normal mood and affect. Her behavior is normal. Thought content normal.   Vitals reviewed.       Skeletal foot exam: defered patient request, foot exam at PCP yesterday        Diabetes Continuous Glucose Monitoring Report      Reason for Study:   - Poorly controlled DM without success with standard interventions   - Glucose variability      Current Therapy:   Farxiga 5 mg                                                    Tresiba 30 units   Humalog 5-20 U AC TID--> 150-200 : 5 units, 3:50 > 200  Metformin 1000 mg BID     CGMS Report   CGMS Device: Freestyle Danya Pro  Data collection from 10/3/18 to 10/10/18  Range set @   Average reading 128 mg/dL   Above target range 6%  In target range 92%  Below target range 2%      Impression:   Postprandial hyperglycemia at lunch and dinner  Occasional nocturnal hypoglycemia     Recommendation:   Repeat A1c at visit today: not done, most recent is 10.2 (A1c Goal < 7% )  Insulin Change: increase Tresiba to 36 units  Dietary Recommendations: reinforced low processed carb diet, ok to ensure good fats and moderate proteins  Repeat IPRO: in 6 months or PRN      Assessment  Laura Garza is a 61 y.o. female with uncontrolled diabetes mellitus type 2 associated with HTN, HLD and obesity and Vitamin D deficiency    Plan  Problem List Items Addressed This Visit     Type 2 diabetes mellitus ([facility] [address] - Primary     Lab Results   Component Value Date    LABA1C 6.6 05/01/2020     Goal A1c  < 6.5%  Avoid hypoglycemia  No change in medication    Diet :   30-40 grams per meal , 3 meals per day, avoid carbs in snack choices  Include moderate proteins and good fats   Ensure adequate hydration and  electrolyte replacement    Exercise :  Recommended exercise is 5-7 days a week for 30-60 mins at least, per day OR a total of 2.5 hours per week , which ever is more feasible. Diabetic Health Maintenance  Follow up with annual eye exams  Follow up with annual podiatry exams if needed  Reviewed sick day management of BG     Other areas of Diabetic Education reviewed:   Carbs: good carbs and bad carbs, importance of carb counting, incorporation of protein with each meal to reduce Glycemic index, importance of portions, Carb/insulin ratio   Fats: Good fats and bad fats, meal planning and supplements.  Discussed how food affects blood sugar readings.     Different diabetic medications   Managing high and low sugar readings   Rotation of sites for subcutaneous medication injection           Relevant Orders    POCT glycosylated hemoglobin (Hb A1C)    HTN (hypertension)     CMP: significantly elevated liver enzymes  Was scheduled to have a liver biopsy but was cancelled due to COVID ( did not have a neg test result prior to bx)  Will follow up  Myotonia may be contributory         HLD (hyperlipidemia)     LDL goal  < 100; recent at 50  TG elevated at 249  Continue current regimen  Managed by PCP           Obesity     Reviewed carbohyrate and caloric restriction  Emphasis on former at this time  Ensure good hydration and adequate electrolyte replacement  Weigh stable         Vitamin D deficiency     Lab Results   Component Value Date    VITD25 33.3 05/01/2020     Continue to supplement @ 500-1000 IU daily         High serum thyroid stimulating hormone (TSH)     Lab Results   Component Value Date    TSH 14.39 (H) 05/01/2020    FT3 3.1 05/01/2020    T4FREE 1.5 05/01/2020     Re-started on brand synthroid @ 50 mcg  Repeat labs in 1 month         Relevant Medications    SYNTHROID 50 MCG tablet    Other Relevant Orders    T4, Free    TSH without Reflex      Greater than 30 minutes spent directly counseling patient about topics listed above (such as lifestyle modifications, preventative screenings and/or disease related processes). Return in about 3 months (around 10/22/2020).

## 2020-07-22 NOTE — ASSESSMENT & PLAN NOTE
Lab Results   Component Value Date    VITD25 33.3 05/01/2020     Continue to supplement @ 500-1000 IU daily

## 2020-07-22 NOTE — ASSESSMENT & PLAN NOTE
Lab Results   Component Value Date    LABA1C 6.6 05/01/2020     Goal A1c  < 6.5%  Avoid hypoglycemia  No change in medication    Diet :   30-40 grams per meal , 3 meals per day, avoid carbs in snack choices  Include moderate proteins and good fats   Ensure adequate hydration and  electrolyte replacement    Exercise :  Recommended exercise is 5-7 days a week for 30-60 mins at least, per day OR a total of 2.5 hours per week , which ever is more feasible. Diabetic Health Maintenance  Follow up with annual eye exams  Follow up with annual podiatry exams if needed  Reviewed sick day management of BG     Other areas of Diabetic Education reviewed:   Carbs: good carbs and bad carbs, importance of carb counting, incorporation of protein with each meal to reduce Glycemic index, importance of portions, Carb/insulin ratio   Fats: Good fats and bad fats, meal planning and supplements.  Discussed how food affects blood sugar readings.     Different diabetic medications   Managing high and low sugar readings   Rotation of sites for subcutaneous medication injection

## 2020-07-22 NOTE — ASSESSMENT & PLAN NOTE
Reviewed carbohyrate and caloric restriction  Emphasis on former at this time  Ensure good hydration and adequate electrolyte replacement  Weigh stable

## 2020-07-22 NOTE — ASSESSMENT & PLAN NOTE
CMP: significantly elevated liver enzymes  Was scheduled to have a liver biopsy but was cancelled due to COVID ( did not have a neg test result prior to bx)  Will follow up  Myotonia may be contributory

## 2020-07-27 ENCOUNTER — OFFICE VISIT (OUTPATIENT)
Dept: FAMILY MEDICINE CLINIC | Age: 65
End: 2020-07-27
Payer: COMMERCIAL

## 2020-07-27 VITALS
TEMPERATURE: 98.3 F | OXYGEN SATURATION: 97 % | DIASTOLIC BLOOD PRESSURE: 60 MMHG | BODY MASS INDEX: 33.58 KG/M2 | WEIGHT: 183.6 LBS | HEART RATE: 71 BPM | SYSTOLIC BLOOD PRESSURE: 110 MMHG

## 2020-07-27 LAB
A/G RATIO: 1.4 (ref 1.1–2.2)
ALBUMIN SERPL-MCNC: 3.7 G/DL (ref 3.4–5)
ALP BLD-CCNC: 61 U/L (ref 40–129)
ALT SERPL-CCNC: 232 U/L (ref 10–40)
ANION GAP SERPL CALCULATED.3IONS-SCNC: 13 MMOL/L (ref 3–16)
AST SERPL-CCNC: 178 U/L (ref 15–37)
BILIRUB SERPL-MCNC: 0.3 MG/DL (ref 0–1)
BUN BLDV-MCNC: 32 MG/DL (ref 7–20)
CALCIUM SERPL-MCNC: 9.3 MG/DL (ref 8.3–10.6)
CHLORIDE BLD-SCNC: 101 MMOL/L (ref 99–110)
CHOLESTEROL, TOTAL: 128 MG/DL (ref 0–199)
CO2: 30 MMOL/L (ref 21–32)
CREAT SERPL-MCNC: 0.8 MG/DL (ref 0.6–1.2)
GFR AFRICAN AMERICAN: >60
GFR NON-AFRICAN AMERICAN: >60
GLOBULIN: 2.7 G/DL
GLUCOSE BLD-MCNC: 117 MG/DL (ref 70–99)
HDLC SERPL-MCNC: 41 MG/DL (ref 40–60)
LDL CHOLESTEROL CALCULATED: 56 MG/DL
POTASSIUM SERPL-SCNC: 5 MMOL/L (ref 3.5–5.1)
SODIUM BLD-SCNC: 144 MMOL/L (ref 136–145)
TOTAL PROTEIN: 6.4 G/DL (ref 6.4–8.2)
TRIGL SERPL-MCNC: 153 MG/DL (ref 0–150)
VLDLC SERPL CALC-MCNC: 31 MG/DL

## 2020-07-27 PROCEDURE — 99214 OFFICE O/P EST MOD 30 MIN: CPT | Performed by: FAMILY MEDICINE

## 2020-07-27 PROCEDURE — 36415 COLL VENOUS BLD VENIPUNCTURE: CPT | Performed by: FAMILY MEDICINE

## 2020-07-27 ASSESSMENT — PATIENT HEALTH QUESTIONNAIRE - PHQ9
2. FEELING DOWN, DEPRESSED OR HOPELESS: 0
SUM OF ALL RESPONSES TO PHQ QUESTIONS 1-9: 0
1. LITTLE INTEREST OR PLEASURE IN DOING THINGS: 0
SUM OF ALL RESPONSES TO PHQ9 QUESTIONS 1 & 2: 0
SUM OF ALL RESPONSES TO PHQ QUESTIONS 1-9: 0

## 2020-07-27 NOTE — PROGRESS NOTES
SUBJECTIVE:  Julieta Davila is a 72 y.o. female who presents for evaluation of type 2 diabetes, hypothyroidism, elevated liver function test, myotonia, hypertension and hyperlipidemia. She indicates that she is feeling well and denies any symptoms referable to her elevated blood pressure. Specifically denies chest pain, palpitations, dyspnea, orthopnea, PND or peripheral edema or neuro sx. No anorexia, , or leg cramps noted. Current medication regimen is as listed below. She denies any side effects of medication, and has been taking it regularly. Lab Results   Component Value Date    LDLCALC 50 01/27/2020    LDLDIRECT 87 04/03/2019       Patient was last seen by me in January. At that time her liver function was extremely elevated and she was referred to GI. A liver biopsy was scheduled, but then canceled due to the COVID pandemic and shutdown. Last  last . She sees endocrinology for management of her diabetes and hypothyroidism. Her last A1c was 6.6 on multiple medications. Patient states her myotonia is stable. It is not better but it is not worse. Patient denies any hypotensive symptoms. In January, the patient was on simvastatin, this was discontinued due to her liver function abnormality.   In April cardiology started her on Lipitor which she is still taking    Current Outpatient Medications   Medication Sig Dispense Refill    Selenium (SELENIMIN PO) Take by mouth      SYNTHROID 50 MCG tablet Take 1 tablet by mouth Daily 30 tablet 1    TRESIBA FLEXTOUCH 200 UNIT/ML SOPN INJECT 50 UNITS UNDER THE SKIN NIGHTLY 9 mL 2    metFORMIN (GLUCOPHAGE) 1000 MG tablet TAKE 1 TABLET BY MOUTH TWICE DAILY WITH MEALS 180 tablet 1    TRULICITY 1.5 YD/5.1VY SOPN INJECT 1.5 MG INTO THE SKIN ONCE EVERY WEEK 2 mL 2    Ertugliflozin L-PyroglutamicAc (STEGLATRO) 15 MG TABS Take 1 tablet by mouth daily (Patient taking differently: Take 0.5 tablets by mouth daily ) 30 tablet 11    amLODIPine (NORVASC) Medication: continue current medication regimen unchanged  2)  Recheck in 6 months, sooner should new symptoms or problems arise. 3) LLR       Clemencia received counseling on the following healthy behaviors: exercise and medication adherence        Discussed use, benefit, and side effects of prescribed medications. Barriers to medication compliance addressed. All patient questions answered. Pt voiced understanding.

## 2020-07-29 ENCOUNTER — TELEPHONE (OUTPATIENT)
Dept: GASTROENTEROLOGY | Age: 65
End: 2020-07-29

## 2020-07-29 NOTE — TELEPHONE ENCOUNTER
----- Message from Zulema Hill MD sent at 7/28/2020  6:07 AM EDT -----  I think until we get the liver biopsy, lipitor should be held. If no significant fibrosis and only RASHEED then probably ok to continue. Needs to be trying to actively lose weight. Goal is usually 7% body weight which has been shown to make a difference. Car Heredia, please setup CT guided liver biopsy followed by office visit 2 weeks after. ----- Message -----  From: Mary Mccabe MD  Sent: 7/27/2020   8:52 AM EDT  To: Zulema Hill MD    Good morning! I saw the patient in the office today. She states that her liver biopsy was canceled due to the COVID pandemic shutdown. I think she is ready to proceed. I discontinued her simvastatin once her liver function surya, however cardiology started her on Lipitor. Do you want her Lipitor stopped?

## 2020-07-30 NOTE — TELEPHONE ENCOUNTER
Will call scheduling department to set this back up. Patient is scheduled for liver bx on 8/4/20 @ 9:30am with a 8:15am arrival. Will need to go over all prep with patient. Left message asking patient to return call to office.

## 2020-07-31 NOTE — TELEPHONE ENCOUNTER
Patient was informed on date/time and stated she could not do that date due to her work schedule she can only do fridays. I gave her the central scheduling number to r/s.

## 2020-08-11 ENCOUNTER — OFFICE VISIT (OUTPATIENT)
Dept: PRIMARY CARE CLINIC | Age: 65
End: 2020-08-11

## 2020-08-11 NOTE — PATIENT INSTRUCTIONS
Advance Care Planning  People with COVID-19 may have no symptoms, mild symptoms, such as fever, cough, and shortness of breath or they may have more severe illness, developing severe and fatal pneumonia. As a result, Advance Care Planning with attention to naming a health care decision maker (someone you trust to make healthcare decisions for you if you could not speak for yourself) and sharing other health care preferences is important BEFORE a possible health crisis. Please contact your Primary Care Provider to discuss Advance Care Planning. Preventing the Spread of Coronavirus Disease 2019 in Homes and Residential Communities  For the most recent information go to [email or web address]    Prevention steps for People with confirmed or suspected COVID-19 (including persons under investigation) who do not need to be hospitalized  and   People with confirmed COVID-19 who were hospitalized and determined to be medically stable to go home    Your healthcare provider and public health staff will evaluate whether you can be cared for at home. If it is determined that you do not need to be hospitalized and can be isolated at home, you will be monitored by staff from your local or state health department. You should follow the prevention steps below until a healthcare provider or local or state health department says you can return to your normal activities. Stay home except to get medical care  People who are mildly ill with COVID-19 are able to isolate at home during their illness. You should restrict activities outside your home, except for getting medical care. Do not go to work, school, or public areas. Avoid using public transportation, ride-sharing, or taxis. Separate yourself from other people and animals in your home  People: As much as possible, you should stay in a specific room and away from other people in your home.  Also, you should use a separate before eating or preparing food. If soap and water are not readily available, use an alcohol-based hand  with at least 60% alcohol, covering all surfaces of your hands and rubbing them together until they feel dry. Soap and water are the best option if hands are visibly dirty. Avoid touching your eyes, nose, and mouth with unwashed hands. Avoid sharing personal household items  You should not share dishes, drinking glasses, cups, eating utensils, towels, or bedding with other people or pets in your home. After using these items, they should be washed thoroughly with soap and water. Clean all high-touch surfaces everyday  High touch surfaces include counters, tabletops, doorknobs, bathroom fixtures, toilets, phones, keyboards, tablets, and bedside tables. Also, clean any surfaces that may have blood, stool, or body fluids on them. Use a household cleaning spray or wipe, according to the label instructions. Labels contain instructions for safe and effective use of the cleaning product including precautions you should take when applying the product, such as wearing gloves and making sure you have good ventilation during use of the product. Monitor your symptoms  Seek prompt medical attention if your illness is worsening (e.g., difficulty breathing). Before seeking care, call your healthcare provider and tell them that you have, or are being evaluated for, COVID-19. Put on a facemask before you enter the facility. These steps will help the healthcare providers office to keep other people in the office or waiting room from getting infected or exposed. Ask your healthcare provider to call the local or state health department. Persons who are placed under active monitoring or facilitated self-monitoring should follow instructions provided by their local health department or occupational health professionals, as appropriate. When working with your local health department check their available hours.   If you have a medical emergency and need to call 911, notify the dispatch personnel that you have, or are being evaluated for COVID-19. If possible, put on a facemask before emergency medical services arrive. Discontinuing home isolation  Patients with confirmed COVID-19 should remain under home isolation precautions until the risk of secondary transmission to others is thought to be low. The decision to discontinue home isolation precautions should be made on a case-by-case basis, in consultation with healthcare providers and state and local health departments.

## 2020-08-11 NOTE — PROGRESS NOTES
Melonie Lesches received a viral test for COVID-19. They were educated on isolation and quarantine as appropriate. For any symptoms, they were directed to seek care from their PCP, given contact information to establish with a doctor, directed to an urgent care or the emergency room.

## 2020-08-12 RX ORDER — HYDROCHLOROTHIAZIDE 25 MG/1
25 TABLET ORAL DAILY
Qty: 90 TABLET | Refills: 1 | Status: SHIPPED | OUTPATIENT
Start: 2020-08-12 | End: 2021-02-23

## 2020-08-13 LAB
SARS-COV-2: NOT DETECTED
SOURCE: NORMAL

## 2020-08-14 ENCOUNTER — HOSPITAL ENCOUNTER (OUTPATIENT)
Dept: ULTRASOUND IMAGING | Age: 65
Discharge: HOME | End: 2020-08-14
Payer: COMMERCIAL

## 2020-08-14 ENCOUNTER — HOSPITAL ENCOUNTER (OUTPATIENT)
Dept: CT IMAGING | Age: 65
Discharge: HOME | End: 2020-08-14
Payer: COMMERCIAL

## 2020-08-14 VITALS
BODY MASS INDEX: 34.23 KG/M2 | SYSTOLIC BLOOD PRESSURE: 141 MMHG | OXYGEN SATURATION: 99 % | HEART RATE: 73 BPM | WEIGHT: 186 LBS | TEMPERATURE: 97.2 F | DIASTOLIC BLOOD PRESSURE: 66 MMHG | HEIGHT: 62 IN | RESPIRATION RATE: 16 BRPM

## 2020-08-14 LAB
BASOPHILS ABSOLUTE: 0.1 K/UL (ref 0–0.2)
BASOPHILS RELATIVE PERCENT: 0.8 %
EOSINOPHILS ABSOLUTE: 0.4 K/UL (ref 0–0.6)
EOSINOPHILS RELATIVE PERCENT: 4.5 %
HCT VFR BLD CALC: 41.5 % (ref 36–48)
HEMOGLOBIN: 13.3 G/DL (ref 12–16)
INR BLD: 0.9 (ref 0.86–1.14)
LYMPHOCYTES ABSOLUTE: 1.3 K/UL (ref 1–5.1)
LYMPHOCYTES RELATIVE PERCENT: 14.6 %
MCH RBC QN AUTO: 28.5 PG (ref 26–34)
MCHC RBC AUTO-ENTMCNC: 32.1 G/DL (ref 31–36)
MCV RBC AUTO: 88.6 FL (ref 80–100)
MONOCYTES ABSOLUTE: 0.4 K/UL (ref 0–1.3)
MONOCYTES RELATIVE PERCENT: 4.7 %
NEUTROPHILS ABSOLUTE: 7 K/UL (ref 1.7–7.7)
NEUTROPHILS RELATIVE PERCENT: 75.4 %
PDW BLD-RTO: 14.7 % (ref 12.4–15.4)
PLATELET # BLD: 269 K/UL (ref 135–450)
PMV BLD AUTO: 8.7 FL (ref 5–10.5)
PROTHROMBIN TIME: 10.4 SEC (ref 10–13.2)
RBC # BLD: 4.68 M/UL (ref 4–5.2)
WBC # BLD: 9.2 K/UL (ref 4–11)

## 2020-08-14 PROCEDURE — 88313 SPECIAL STAINS GROUP 2: CPT

## 2020-08-14 PROCEDURE — 85610 PROTHROMBIN TIME: CPT

## 2020-08-14 PROCEDURE — 85025 COMPLETE CBC W/AUTO DIFF WBC: CPT

## 2020-08-14 PROCEDURE — 36592 COLLECT BLOOD FROM PICC: CPT

## 2020-08-14 PROCEDURE — 7100000010 HC PHASE II RECOVERY - FIRST 15 MIN

## 2020-08-14 PROCEDURE — 6360000002 HC RX W HCPCS: Performed by: RADIOLOGY

## 2020-08-14 PROCEDURE — 7100000011 HC PHASE II RECOVERY - ADDTL 15 MIN

## 2020-08-14 PROCEDURE — 2709999900 US BIOPSY LIVER PERCUTANEOUS

## 2020-08-14 PROCEDURE — 36415 COLL VENOUS BLD VENIPUNCTURE: CPT

## 2020-08-14 PROCEDURE — 88307 TISSUE EXAM BY PATHOLOGIST: CPT

## 2020-08-14 RX ORDER — ACETAMINOPHEN 325 MG/1
650 TABLET ORAL EVERY 4 HOURS PRN
Status: DISCONTINUED | OUTPATIENT
Start: 2020-08-14 | End: 2020-08-15 | Stop reason: HOSPADM

## 2020-08-14 RX ORDER — MIDAZOLAM HYDROCHLORIDE 5 MG/ML
INJECTION INTRAMUSCULAR; INTRAVENOUS
Status: COMPLETED | OUTPATIENT
Start: 2020-08-14 | End: 2020-08-14

## 2020-08-14 RX ORDER — ONDANSETRON 2 MG/ML
4 INJECTION INTRAMUSCULAR; INTRAVENOUS EVERY 8 HOURS PRN
Status: DISCONTINUED | OUTPATIENT
Start: 2020-08-14 | End: 2020-08-15 | Stop reason: HOSPADM

## 2020-08-14 RX ORDER — FENTANYL CITRATE 50 UG/ML
INJECTION, SOLUTION INTRAMUSCULAR; INTRAVENOUS
Status: COMPLETED | OUTPATIENT
Start: 2020-08-14 | End: 2020-08-14

## 2020-08-14 RX ADMIN — Medication 50 MCG: at 09:15

## 2020-08-14 RX ADMIN — MIDAZOLAM HYDROCHLORIDE 1 MG: 5 INJECTION, SOLUTION INTRAMUSCULAR; INTRAVENOUS at 09:10

## 2020-08-14 RX ADMIN — MIDAZOLAM HYDROCHLORIDE 1 MG: 5 INJECTION, SOLUTION INTRAMUSCULAR; INTRAVENOUS at 09:15

## 2020-08-14 RX ADMIN — Medication 50 MCG: at 09:11

## 2020-08-14 ASSESSMENT — PAIN - FUNCTIONAL ASSESSMENT: PAIN_FUNCTIONAL_ASSESSMENT: 0-10

## 2020-08-14 ASSESSMENT — PAIN SCALES - GENERAL: PAINLEVEL_OUTOF10: 0

## 2020-08-14 NOTE — BRIEF OP NOTE
Brief Postoperative Note    Edouard Saini  YOB: 1955  [id number]    Pre-operative Diagnosis: abnormal liver function    Post-operative Diagnosis: Same    Procedure:  US Guided random liver core biopsy    Anesthesia: Moderate Sedation    Surgeons/Assistants: Dr. Nadia Sherwood    Estimated Blood Loss: less than 5ml     Complications: None    Specimens: Was Obtained: random left hepatic lobe    Findings: 2 18G core biopsies    Electronically signed by Nina Spangler MD on 8/14/2020 at 9:04 AM

## 2020-08-14 NOTE — PROGRESS NOTES
Patient's liver biopsy completed without complication. Patient tolerated procedure well et returned to pre-Roldan score prior to transfer to Post case. Report given to Zee rothman RN.

## 2020-08-14 NOTE — PRE SEDATION
Sedation Pre-Procedure Note    Patient Name: Miriam Gutierrez   YOB: 1955  Room/Bed: Room/bed info not found  Medical Record Number: [id number]  Date: 8/14/2020   Time: 9:03 AM       Indication:  Abnormal liver function-random liver biopsy    Consent: I have discussed with the patient and/or the patient representative the indication, alternatives, and the possible risks and/or complications of the planned procedure and the anesthesia methods. The patient and/or patient representative appear to understand and agree to proceed. Vital Signs:   Vitals:    08/14/20 0801   BP: (!) 150/85   Pulse: 78   Resp: 16   Temp: 97 °F (36.1 °C)   SpO2: 96%       Past Medical History:   has a past medical history of Asthma, Chronic kidney disease, Hyperlipidemia, Hypertension, Myotonia, and Type II or unspecified type diabetes mellitus without mention of complication, not stated as uncontrolled. Past Surgical History:   has a past surgical history that includes Spine surgery (1995) and Hysterectomy. Medications:   Scheduled Meds:   Continuous Infusions:   PRN Meds:   Home Meds:   Prior to Admission medications    Medication Sig Start Date End Date Taking?  Authorizing Provider   hydroCHLOROthiazide (HYDRODIURIL) 25 MG tablet TAKE 1 TABLET BY MOUTH DAILY 8/12/20  Yes Jeffrey Rios MD   Selenium (SELENIMIN PO) Take by mouth   Yes Historical Provider, MD   SYNTHROID 50 MCG tablet Take 1 tablet by mouth Daily 7/22/20  Yes SHIRA Olivia CNP   TRESIBA FLEXTOUCH 200 UNIT/ML SOPN INJECT 50 UNITS UNDER THE SKIN NIGHTLY 7/10/20  Yes SHIRA Olivia CNP   metFORMIN (GLUCOPHAGE) 1000 MG tablet TAKE 1 TABLET BY MOUTH TWICE DAILY WITH MEALS 7/10/20  Yes Jeffrey Rios MD   TRULICITY 1.5 JASON/2.0WG SOPN INJECT 1.5 MG INTO THE SKIN ONCE EVERY WEEK 6/15/20  Yes Adalberto Jessica MD   Ertugliflozin L-PyroglutamicAc (STEGLATRO) 15 MG TABS Take 1 tablet by mouth daily  Patient taking differently: Take 0.5 tablets by mouth daily  4/22/20  Yes Elver Hernandez APRN - CNP   amLODIPine (NORVASC) 5 MG tablet Take 1 tablet by mouth daily 4/14/20  Yes Mary Diana MD   carvedilol (COREG) 25 MG tablet TAKE 1 TABLET BY MOUTH TWICE DAILY 4/14/20  Yes Mary Diana MD   HUMALOG KWIKPEN 100 UNIT/ML SOPN ADMINISTER 5 TO 20 UNITS UNDER THE SKIN THREE TIMES DAILY BEFORE MEALS 4/9/20  Yes Pamela Monroy MD   Cyanocobalamin (B-12) 100 MCG TABS    Yes Historical Provider, MD   vitamin D (CHOLECALCIFEROL) 1000 UNIT TABS tablet Take 1,000 Units by mouth daily   Yes Historical Provider, MD   Multiple Vitamins-Minerals (MULTIVITAMIN WOMEN) TABS Take by mouth daily   Yes Historical Provider, MD   Magnesium 400 MG TABS Take by mouth nightly   Yes Historical Provider, MD   cetirizine (ZYRTEC) 10 MG tablet Take 10 mg by mouth daily   Yes Historical Provider, MD   CINNAMON PO Take by mouth   Yes Historical Provider, MD     Coumadin Use Last 7 Days:  no  Antiplatelet drug therapy use last 7 days: no  Other anticoagulant use last 7 days: no  Additional Medication Information:        Pre-Sedation Documentation and Exam:   I have reviewed the patient's history and review of systems.     Mallampati Airway Assessment:  normal neck range of motion    Prior History of Anesthesia Complications:   none    ASA Classification:  Class 2 - A normal healthy patient with mild systemic disease    Sedation/ Anesthesia Plan:   intravenous sedation    Medications Planned:   midazolam (Versed) intravenously and fentanyl intravenously    Patient is an appropriate candidate for plan of sedation: yes    Electronically signed by Barron Moon MD on 8/14/2020 at 9:03 AM

## 2020-08-21 ENCOUNTER — HOSPITAL ENCOUNTER (OUTPATIENT)
Age: 65
Discharge: HOME | End: 2020-08-21
Payer: COMMERCIAL

## 2020-08-21 ENCOUNTER — OFFICE VISIT (OUTPATIENT)
Dept: GASTROENTEROLOGY | Age: 65
End: 2020-08-21
Payer: COMMERCIAL

## 2020-08-21 VITALS
HEART RATE: 79 BPM | SYSTOLIC BLOOD PRESSURE: 158 MMHG | WEIGHT: 185.6 LBS | BODY MASS INDEX: 34.16 KG/M2 | DIASTOLIC BLOOD PRESSURE: 74 MMHG | HEIGHT: 62 IN | TEMPERATURE: 97.5 F

## 2020-08-21 LAB
T4 FREE: 1.2 NG/DL (ref 0.9–1.8)
TSH SERPL DL<=0.05 MIU/L-ACNC: 5.66 UIU/ML (ref 0.27–4.2)

## 2020-08-21 PROCEDURE — 36415 COLL VENOUS BLD VENIPUNCTURE: CPT

## 2020-08-21 PROCEDURE — 84443 ASSAY THYROID STIM HORMONE: CPT

## 2020-08-21 PROCEDURE — 84439 ASSAY OF FREE THYROXINE: CPT

## 2020-08-21 PROCEDURE — 99214 OFFICE O/P EST MOD 30 MIN: CPT | Performed by: INTERNAL MEDICINE

## 2020-08-21 NOTE — PROGRESS NOTES
patient name Bhavna Reyes T\" and additionally labeled \"liver biopsy\", the specimen consists   of 4 tan-red fragments of needle core biopsy, which have lengths ranging   from 0.3 cm to 1.9 cm. The specimen is submitted in toto in a single   cassette.  DORYS/ALISON     MICROSCOPIC DESCRIPTION:  Microscopic examination performed. The liver   needle biopsy has greater than 15 portal tracts available for evaluation.    The hepatic architecture is intact.  A slight majority of the portal   areas are expanded by fibrosis and a minimal mixed inflammatory   infiltrate composed of non-aggregating lymphocytes, plasma cells and   histiocytes (predominantly pigmented \"clean-up\" macrophages).  The   hepatic artery and portal vein are present and intact. Bile duct branches   are present in virtually all portal areas with no significant duct   damage.  No proliferating bile ductules or inflammatory activity are   identified at the interface.     The lobular parenchyma is notable for an absence of steatosis,   significant inflammation, ballooning degeneration or dead hepatocytes. Cholestasis is not present.  Central areas and vessels appear   unremarkable.  A trichrome stain (performed to evaluate for fibrosis)   highlights portal fibrosis in a slight majority of portal areas.  An iron   stain (performed to evaluate for increased iron deposition) shows no   significant increase in iron deposition within hepatocytes or Kupffer   cells. No cytoplasmic globules are seen within periportal hepatocytes on   H&E or PAS-D stained sections (performed to evaluate for a1-antitrypsin   globules).  No polarizable foreign material is identified under polarized   light. Caren Troy M.D.   (Electronic Signature)   08/17/2020     HPI elements: location, severity, timing, modifying factors, quality, duration, context and associated signs/symptoms.     Last Encounter Reviewed: 3/6/2020  Pretty Varela  Presents for elevated transaminases lb 9.6 oz (87.8 kg)   12/20/17 197 lb (89.4 kg)   10/24/17 202 lb 8 oz (91.9 kg)   09/13/17 197 lb (89.4 kg)   09/12/17 197 lb 8 oz (89.6 kg)   08/18/17 198 lb (89.8 kg)   06/12/17 207 lb 3.2 oz (94 kg)   03/06/17 211 lb (95.7 kg)   01/18/17 202 lb (91.6 kg)   12/02/16 198 lb (89.8 kg)   08/31/16 198 lb (89.8 kg)   05/27/16 202 lb (91.6 kg)   03/16/16 204 lb (92.5 kg)   12/15/15 197 lb (89.4 kg)   09/15/15 198 lb (89.8 kg)   06/16/15 191 lb (86.6 kg)   03/17/15 195 lb (88.5 kg)   12/15/14 188 lb (85.3 kg)   09/16/14 192 lb (87.1 kg)   06/24/14 186 lb (84.4 kg)   06/16/14 186 lb (84.4 kg)   03/10/14 188 lb (85.3 kg)   12/10/13 190 lb (86.2 kg)   09/17/13 190 lb 12.8 oz (86.5 kg)   06/19/13 191 lb (86.6 kg)   03/19/13 188 lb 6.4 oz (85.5 kg)   12/17/12 187 lb 12.8 oz (85.2 kg)   09/17/12 187 lb 9.6 oz (85.1 kg)   06/20/12 180 lb 3.2 oz (81.7 kg)   03/20/12 179 lb (81.2 kg)       No components found for: HGBA1C  BP Readings from Last 3 Encounters:   08/21/20 (!) 158/74   08/14/20 (!) 141/66   07/27/20 110/60     Health Maintenance   Topic Date Due    Shingles Vaccine (2 of 3) 07/22/2016    Diabetic microalbuminuria test  12/28/2019    Diabetic foot exam  07/24/2020    Annual Wellness Visit (AWV)  08/02/2020    Flu vaccine (1) 09/01/2020    Pneumococcal 65+ years Vaccine (1 of 1 - PPSV23) 09/15/2020    A1C test (Diabetic or Prediabetic)  05/01/2021    Lipid screen  07/27/2021    Potassium monitoring  07/27/2021    Creatinine monitoring  07/27/2021    Breast cancer screen  10/11/2021    Diabetic retinal exam  12/10/2021    Colon cancer screen fecal DNA test (Cologuard)  02/25/2022    DEXA (modify frequency per FRAX score)  Completed    Hepatitis C screen  Completed    HIV screen  Completed    Hepatitis A vaccine  Aged Out    Hib vaccine  Aged Out    Meningococcal (ACWY) vaccine  Aged Out       No components found for: Wonga     PAST MEDICAL HISTORY     Past Medical History:   Diagnosis Date    Asthma     Chronic kidney disease     Hyperlipidemia     Hypertension     Myotonia     Type II or unspecified type diabetes mellitus without mention of complication, not stated as uncontrolled      FAMILY HISTORY     Family History   Problem Relation Age of Onset    Diabetes Father      SOCIAL HISTORY     Social History     Socioeconomic History    Marital status:       Spouse name: Not on file    Number of children: Not on file    Years of education: Not on file    Highest education level: Not on file   Occupational History    Not on file   Social Needs    Financial resource strain: Not on file    Food insecurity     Worry: Not on file     Inability: Not on file    Transportation needs     Medical: Not on file     Non-medical: Not on file   Tobacco Use    Smoking status: Never Smoker    Smokeless tobacco: Never Used   Substance and Sexual Activity    Alcohol use: No    Drug use: No    Sexual activity: Not Currently   Lifestyle    Physical activity     Days per week: Not on file     Minutes per session: Not on file    Stress: Not on file   Relationships    Social connections     Talks on phone: Not on file     Gets together: Not on file     Attends Orthodoxy service: Not on file     Active member of club or organization: Not on file     Attends meetings of clubs or organizations: Not on file     Relationship status: Not on file    Intimate partner violence     Fear of current or ex partner: Not on file     Emotionally abused: Not on file     Physically abused: Not on file     Forced sexual activity: Not on file   Other Topics Concern    Not on file   Social History Narrative    Not on file     SURGICAL HISTORY     Past Surgical History:   Procedure Laterality Date    [facility]    cervical    US GUIDED LIVER BIOPSY PERCUTANEOUS  8/14/2020    [facility] 8/14/2020 [address] [facility]   (This list may include medications prescribed during this encounter as epic can not insert only the list prior to this encounter.)  Current Outpatient Rx   Medication Sig Dispense Refill    hydroCHLOROthiazide (HYDRODIURIL) 25 MG tablet TAKE 1 TABLET BY MOUTH DAILY 90 tablet 1    Selenium (SELENIMIN PO) Take by mouth      SYNTHROID 50 MCG tablet Take 1 tablet by mouth Daily 30 tablet 1    TRESIBA FLEXTOUCH 200 UNIT/ML SOPN INJECT 50 UNITS UNDER THE SKIN NIGHTLY 9 mL 2    metFORMIN (GLUCOPHAGE) 1000 MG tablet TAKE 1 TABLET BY MOUTH TWICE DAILY WITH MEALS 180 tablet 1    TRULICITY 1.5 IY/0.0LZ SOPN INJECT 1.5 MG INTO THE SKIN ONCE EVERY WEEK 2 mL 2    Ertugliflozin L-PyroglutamicAc (STEGLATRO) 15 MG TABS Take 1 tablet by mouth daily (Patient taking differently: Take 0.5 tablets by mouth daily ) 30 tablet 11    amLODIPine (NORVASC) 5 MG tablet Take 1 tablet by mouth daily 90 tablet 1    carvedilol (COREG) 25 MG tablet TAKE 1 TABLET BY MOUTH TWICE DAILY 180 tablet 1    HUMALOG KWIKPEN 100 UNIT/ML SOPN ADMINISTER 5 TO 20 UNITS UNDER THE SKIN THREE TIMES DAILY BEFORE MEALS 15 mL 5    Cyanocobalamin (B-12) 100 MCG TABS       vitamin D (CHOLECALCIFEROL) 1000 UNIT TABS tablet Take 1,000 Units by mouth daily      Multiple Vitamins-Minerals (MULTIVITAMIN WOMEN) TABS Take by mouth daily      Magnesium 400 MG TABS Take by mouth nightly      cetirizine (ZYRTEC) 10 MG tablet Take 10 mg by mouth daily      CINNAMON PO Take by mouth       ALLERGIES     Allergies   Allergen Reactions    Norvasc [Amlodipine Besylate] Other (See Comments)     edema    Proventil [Albuterol Sulfate] Other (See Comments)     Paralyzed vocal cords    Tetanus Toxoids      IMMUNIZATIONS     Immunization History   Administered Date(s) Administered    Influenza Virus Vaccine 09/16/2014, 09/15/2015    Influenza, Quadv, IM, PF (6 mo and older Fluzone, Flulaval, Fluarix, and 3 yrs and older Afluria) 12/02/2016, 09/13/2017, 10/02/2018    Pneumococcal Polysaccharide ([id number]) 02/01/2008, 09/15/2015    Zoster Live (Zostavax) 05/27/2016     REVIEW OF SYSTEMS   See HPI for further details and pertinent postiives. Negative for the following:  Constitutional: Negative for weight change. Negative for appetite change and fatigue. HENT: Negative for nosebleeds, sore throat, mouth sores, and voice change. Respiratory: Negative for cough, choking and chest tightness. Cardiovascular: Negative for chest pain   Gastrointestinal: See HPI  Musculoskeletal: Negative for arthralgias. Skin: Negative for pallor. Neurological: Negative for weakness and light-headedness. Hematological: Negative for adenopathy. Does not bruise/bleed easily. Psychiatric/Behavioral: Negative for suicidal ideas. PHYSICAL EXAM   VITAL SIGNS: BP (!) 158/74 (Site: Right Upper Arm, Position: Sitting, Cuff Size: Large Adult)   Pulse 79   Temp 97.5 °F (36.4 °C)   Ht 5' 2\" (1.575 m)   Wt 185 lb 9.6 oz (84.2 kg)   BMI 33.95 kg/m²   Wt Readings from Last 3 Encounters:   08/21/20 185 lb 9.6 oz (84.2 kg)   08/14/20 186 lb (84.4 kg)   07/27/20 183 lb 9.6 oz (83.3 kg)     Constitutional: Well developed, Well nourished, No acute distress, Non-toxic appearance. HENT: Normocephalic, Atraumatic, Bilateral external ears normal, Oropharynx moist, No oral exudates, Nose normal.   Eyes: Conjunctiva normal, No discharge. Neck: Normal range of motion, No tenderness, Supple, No stridor. Lymphatic: No cervical, subclavian, or axillary lymphadenopathy. Cardiovascular: Normal heart rate, Normal rhythm, No murmurs, No rubs, No gallops. Thorax & Lungs: Normal breath sounds, No respiratory distress, No wheezing, No chest tenderness. No gynecomastia. Abdomen: scars consistent with stated surgeries, no hernias, no HSM, soft NTND   Rectal:  Deferred. Skin: Warm, Dry, No erythema, No rash. No bruising. No spider hemangiomas. Back: No tenderness, No CVA tenderness.    Lower Extremities: Intact distal pulses, No edema, No tenderness, No cyanosis, No clubbing. Neurologic: Alert & oriented x 3, Normal motor function, Normal sensory function, No focal deficits noted. No asterixis. RADIOLOGY/PROCEDURES       FINAL IMPRESSION     Orders Placed This Encounter   Procedures    Aldolase    LILIAN     Standing Status:   Future     Standing Expiration Date:   9/24/2020    Liver-Kidney Microsome (LKM-1) Ab (IgG)     Standing Status:   Future     Standing Expiration Date:   9/24/2020    CK    Cytomegalovirus Ab,IGG,IGM     Standing Status:   Future     Standing Expiration Date:   9/24/2020   Dre Deacon Virus (EBV) Antibody Panel I     Standing Status:   Future     Standing Expiration Date:   9/24/2020    Herpes simplex virus (HSV) I/II antibodies IgG & IgM w/ reflex     Standing Status:   Future     Standing Expiration Date:   9/24/2020    Misc nursing order (specify)     Standing Status:   Future     Standing Expiration Date:   9/21/2020     Scheduling Instructions:      Schedule Fibroscan test through [facility] endoscopy. Gerardo Villagran was seen today for follow-up. Diagnoses and all orders for this visit:    Elevated LFTs  -     [facility] nursing order (specify); Future  -     Aldolase  -     LILIAN; Future  -     Liver-Kidney Microsome (LKM-1) Ab (IgG); Future  -     CK  -     Cytomegalovirus Ab,IGG,IGM; Future  -     Bhargav Barr Virus (EBV) Antibody Panel I; Future  -     Herpes simplex virus (HSV) I/II antibodies IgG & IgM w/ reflex; Future    Have also requested labs ordered at [address] [place] [address] [facility] for myotonia. ORDERED FUTURE/PENDING TESTS     Lab Frequency Next Occurrence   Basic Metabolic Panel Once 45/09/5459   COVID-19 Ambulatory Once 07/28/2020       FOLLOWUP   Return for after ordered tests.           Vinh Talley 8/21/20 2:39 PM EDT    CC:  Mor Beltran MD

## 2020-08-24 ENCOUNTER — TELEPHONE (OUTPATIENT)
Dept: GASTROENTEROLOGY | Age: 65
End: 2020-08-24

## 2020-08-24 NOTE — TELEPHONE ENCOUNTER
----- Message from Bc Bay MD sent at 8/24/2020  5:48 AM EDT -----  I need additional records from [address] [place] [address] [id number] neurology if they have it. There are records/office visits scanned into media but all the labs they have ordered over the last 8-9 months are not included. I would like to see the labs if we can get them.

## 2020-08-24 NOTE — TELEPHONE ENCOUNTER
----- Message from Cydney Varela MD sent at 8/24/2020  5:48 AM EDT -----  I need additional records from [address] [place] [address] [id number] neurology if they have it. There are records/office visits scanned into media but all the labs they have ordered over the last 8-9 months are not included. I would like to see the labs if we can get them.

## 2020-08-28 ENCOUNTER — HOSPITAL ENCOUNTER (OUTPATIENT)
Dept: ENDOSCOPY | Age: 65
Discharge: HOME | End: 2020-08-28
Payer: COMMERCIAL

## 2020-08-28 ENCOUNTER — HOSPITAL ENCOUNTER (OUTPATIENT)
Age: 65
Discharge: HOME | End: 2020-08-28
Payer: COMMERCIAL

## 2020-08-28 PROCEDURE — 86645 CMV ANTIBODY IGM: CPT

## 2020-08-28 PROCEDURE — 86644 CMV ANTIBODY: CPT

## 2020-08-28 PROCEDURE — 91200 LIVER ELASTOGRAPHY: CPT

## 2020-08-28 PROCEDURE — 86039 ANTINUCLEAR ANTIBODIES (ANA): CPT

## 2020-08-28 PROCEDURE — 86664 EPSTEIN-BARR NUCLEAR ANTIGEN: CPT

## 2020-08-28 PROCEDURE — 86038 ANTINUCLEAR ANTIBODIES: CPT

## 2020-08-28 PROCEDURE — 86694 HERPES SIMPLEX NES ANTBDY: CPT

## 2020-08-28 PROCEDURE — 36415 COLL VENOUS BLD VENIPUNCTURE: CPT

## 2020-08-28 PROCEDURE — 86695 HERPES SIMPLEX TYPE 1 TEST: CPT

## 2020-08-28 PROCEDURE — 86663 EPSTEIN-BARR ANTIBODY: CPT

## 2020-08-28 PROCEDURE — 91200 LIVER ELASTOGRAPHY: CPT | Performed by: INTERNAL MEDICINE

## 2020-08-28 PROCEDURE — 86696 HERPES SIMPLEX TYPE 2 TEST: CPT

## 2020-08-28 PROCEDURE — 86376 MICROSOMAL ANTIBODY EACH: CPT

## 2020-08-28 PROCEDURE — 86665 EPSTEIN-BARR CAPSID VCA: CPT

## 2020-08-30 LAB
CYTOMEGALOVIRUS IGG ANTIBODY: <0.2 U/ML
CYTOMEGALOVIRUS IGM ANTIBODY: <8 AU/ML
EPSTEIN BARR VIRUS NUCLEAR AB IGG: 414 U/ML (ref 0–21.9)
EPSTEIN-BARR EARLY ANTIGEN ANTIBODY: 105 U/ML (ref 0–10.9)
EPSTEIN-BARR VCA IGG: 416 U/ML (ref 0–21.9)
EPSTEIN-BARR VCA IGM: <10 U/ML (ref 0–43.9)
LIVER-KIDNEY MICROSOME-1 AB IGG: 1.1 U (ref 0–24.9)

## 2020-08-31 LAB
ANA INTERPRETATION: ABNORMAL
ANA TITER: ABNORMAL
ANTI-NUCLEAR ANTIBODY (ANA): POSITIVE

## 2020-09-01 LAB
HERPES TYPE 1/2 IGM COMBINED: 0.63 IV
HERPES TYPE I/II IGG COMBINED: >22.4 IV
HSV 1 GLYCOPROTEIN G AB IGG: 33.6 IV
HSV 2 GLYCOPROTEIN G AB IGG: 0.1 IV

## 2020-09-03 RX ORDER — LEVOTHYROXINE SODIUM 50 MCG
TABLET ORAL
Qty: 34 TABLET | Refills: 1 | Status: SHIPPED | OUTPATIENT
Start: 2020-09-03 | End: 2020-12-01 | Stop reason: SDUPTHER

## 2020-09-15 ENCOUNTER — TELEPHONE (OUTPATIENT)
Dept: GASTROENTEROLOGY | Age: 65
End: 2020-09-15

## 2020-09-15 NOTE — TELEPHONE ENCOUNTER
LM on VM Fibroscan results and to call back with any questions, correlates with Liver Biopsy F2- mild fibrosis

## 2020-10-16 ENCOUNTER — HOSPITAL ENCOUNTER (OUTPATIENT)
Dept: WOMENS IMAGING | Age: 65
Discharge: HOME | End: 2020-10-16
Payer: COMMERCIAL

## 2020-10-16 PROCEDURE — 77067 SCR MAMMO BI INCL CAD: CPT

## 2020-10-23 ENCOUNTER — TELEPHONE (OUTPATIENT)
Dept: CARDIOLOGY CLINIC | Age: 65
End: 2020-10-23

## 2020-10-23 RX ORDER — CARVEDILOL 25 MG/1
TABLET ORAL
Qty: 180 TABLET | Refills: 1 | Status: SHIPPED | OUTPATIENT
Start: 2020-10-23 | End: 2021-04-26

## 2020-10-23 RX ORDER — AMLODIPINE BESYLATE 5 MG/1
5 TABLET ORAL DAILY
Qty: 90 TABLET | Refills: 1 | Status: SHIPPED | OUTPATIENT
Start: 2020-10-23 | End: 2021-04-26

## 2020-10-28 ENCOUNTER — OFFICE VISIT (OUTPATIENT)
Dept: ENDOCRINOLOGY | Age: 65
End: 2020-10-28
Payer: COMMERCIAL

## 2020-10-28 VITALS
DIASTOLIC BLOOD PRESSURE: 66 MMHG | BODY MASS INDEX: 34.41 KG/M2 | TEMPERATURE: 98.5 F | HEIGHT: 62 IN | WEIGHT: 187 LBS | SYSTOLIC BLOOD PRESSURE: 128 MMHG | OXYGEN SATURATION: 96 % | RESPIRATION RATE: 14 BRPM | HEART RATE: 78 BPM

## 2020-10-28 PROBLEM — E03.9 ACQUIRED HYPOTHYROIDISM: Status: ACTIVE | Noted: 2019-07-03

## 2020-10-28 LAB — HBA1C MFR BLD: 6.4 %

## 2020-10-28 PROCEDURE — 99214 OFFICE O/P EST MOD 30 MIN: CPT | Performed by: NURSE PRACTITIONER

## 2020-10-28 PROCEDURE — 83036 HEMOGLOBIN GLYCOSYLATED A1C: CPT | Performed by: NURSE PRACTITIONER

## 2020-10-28 ASSESSMENT — ENCOUNTER SYMPTOMS
SHORTNESS OF BREATH: 0
COLOR CHANGE: 0
CONSTIPATION: 0
NAUSEA: 0
EYE PAIN: 0
DIARRHEA: 0

## 2020-10-28 NOTE — ASSESSMENT & PLAN NOTE
Reviewed carbohyrate and caloric restriction  Emphasis on former at this time  Ensure good hydration and adequate electrolyte replacement  BMI > 33

## 2020-10-28 NOTE — ASSESSMENT & PLAN NOTE
DEXA in 10/2019-->Osteopenia  Discussed vitamin D and calcium supplementation  Lab Results   Component Value Date    VITD25 33.3 05/01/2020   Calcium 9.5

## 2020-10-28 NOTE — ASSESSMENT & PLAN NOTE
Lab Results   Component Value Date    VITD25 33.3 05/01/2020     Continue to supplement  recheck in 3 months

## 2020-10-28 NOTE — PROGRESS NOTES
Endocrinology  Angel Humphrey CNP, [address]  [place], [address]  Phone [phone]  Fax [phone]      Aaron Brasher is a 72 y.o. female who is following up for management of Diabetes Mellitus Type 2. PCP: Marcel Hummel MD    Last A1C:   Lab Results   Component Value Date    LABA1C 6.4 10/28/2020    LABA1C 6.6 05/01/2020    LABA1C 6.5 01/27/2020     Last BP Readings:   BP Readings from Last 3 Encounters:   10/28/20 128/66   08/21/20 (!) 158/74   08/14/20 (!) 141/66     Last LDL:   Lab Results   Component Value Date    LDLCALC 56 07/27/2020    LDLDIRECT 87 04/03/2019     Aspirin Use: no    Tobacco History:    Smoking status: Never Smoker    Smokeless tobacco: Never Used    Alcohol use No     Diabetes:  Aaron Brasher  has been diabetic since 1996 and on insulin for 2 years. Patient reports that diabetes is generally not well controlled. Disease course has been stable but uncontrolled. Patient reports compliance for about 80% of the time and adheres to medication, but usually not to diet and exercise instructions. There have been no recent hospital or ED admissions for hypoglycemia, hyperglycemia or DKA. Has had 3 back surgeries:  Last in 2002, chronic back pain, sciatica. DMITRIY in 1996 when she was 41. Was on HRT for 10 years ago  Neck surgery (C5 and C6 ) in 1995    Patient brought no or glucometer at this visit  Readings per day  4 per patient report--> now checking 2 x daily per patient: in morning and after dinner  Average reading 150  Lowest in past 2 weeks 98--> 96 ( usually low 100s)  Highest in past 2 weeks 202--> 176  Hypoglycemia awareness and symptoms: no--> last week and ok after drinking OJ. Did not check BG  CGM done  Usual carbs per meal:does not count carbs    HPI on 10/28/2020  A1c is improved @ 6.4%  BG is 90 -120  TSH significantly elevated.  Patient stopped levothyroxine per self  Liver enzymes very elevated, followed by GI    HPI on 7/22/2020  A1c is maintained @ 6.6%  BG is 90 -120  90 day average was 136  TSH significantly elevated. Patient stopped levothyroxine per self  Liver enzymes very elevated, followed by GI  Myotonia       HPI on 4/22/2020  Reports doing well  Has not had falls in 4 months  BG is 120-130 fasting; checks approx 1 hour > dinner in 160-170s  Stopped taking levothyroxine as could not get refill per patient  Could not get Berdine Sallies rx filled      HPI on 10/9/19  Doing well; appetite suppression  Does low carb options; tolerates medication ok  Reports significant fatigue; occasional falls. Unable to bowl due to fatigue    Lab Results   Component Value Date    .7 05/01/2020    .6 12/28/2018    .4 06/27/2018     Current Medication regimen:   Steglatro 15 mg QD ( takes 1/2 tab frequently)                                             Tresiba 30 units -- has not titrated--> 24 units--> 20 units  Humalog 5-20 U AC TID--> 150-200 : 10 units, 2:50 > 150: typically doses BID with 10 units --> takes QD with dinner only now  Metformin 6196 mg BID  Trulicity 0.72 mg --> 1.5 mg  GFR > 60    Diet:  Typically eat one meal a day: vegetables, meat, rice, soups (cream of potato). --> has improved diet and decreased overall carb intake  Coffee with no sugar, lots of water, lite beer usually 1/day  Reports financial constraints that prevent making better food choices    Microvascular Complications:  · Neuropathy denies concerns  · Retinopathy no concerns with vision, field of vision  · Nephropathy  Component    Latest Ref Rng & Units 6/27/2018   Microalbumin, Random Urine    <2.0 mg/dL <1.20   Creatinine, Ur    28.0 - 259.0 mg/dL 75.1   Microalbumin Creatinine Ratio    0.0 - 30.0 mg/g see below     Diabetic Health Maintenance   · Last Eye Exam: 11/2018--> next visit in 11/2020  · Last Foot exam: at visit  · Has patient seen a dietitian?  Yes  · Current Exercise: No structured exercise  · On ACEI or ARB: no; on norvasc and coreg  ·     Risk Factors  · Smoker: no  · ETOH:no  · Co-morbid conditions: HTN, HLD, Obesity, CKD    Hypothyroidism  Stopped levothryoxine 50 mcg per self  States it made her fall, however she has myotonia and has imbalance issues prior to this  Discussed latest results  Patient denies denies fatigue, weight changes, heat/cold intolerance, bowel/skin changes or CVS symptoms. Lab Results   Component Value Date    TSH 5.66 08/21/2020    TSH 14.39 05/01/2020    TSH 4.02 10/18/2019    FT3 3.1 05/01/2020    FT3 2.6 07/03/2019    T4FREE 1.2 08/21/2020    T4FREE 1.5 05/01/2020    T4FREE 1.4 10/18/2019       Hyperlipidemia: Currently is on Atorvastatin 40 mg. Current complaints include occasional myalgias but otherwise tolerates well. Lab Results   Component Value Date    CHOL 128 07/27/2020    CHOL 148 01/27/2020    CHOL 182 04/03/2019     Lab Results   Component Value Date    TRIG 153 07/27/2020    TRIG 249 01/27/2020    TRIG 384 04/03/2019     Lab Results   Component Value Date    HDL 41 07/27/2020    HDL 48 01/27/2020    HDL 42 04/03/2019    HDL 47 03/20/2012    HDL 59 09/20/2011    HDL 49 12/17/2010     Lab Results   Component Value Date    LDLCALC 56 07/27/2020    LDLCALC 50 01/27/2020    LDLCALC see below 04/03/2019     Lab Results   Component Value Date    LDLDIRECT 87 04/03/2019    LDLDIRECT 66 10/02/2018    LDLDIRECT 127 09/17/2013     No results found for: CHOLHDLRATIO    Vitamin D deficiency: Currently is on no supplementation. Current complaints include fatigue on daily basis. Last vitamin D level is:  Lab Results   Component Value Date    VITD25 33.3 05/01/2020    VITD25 23.3 10/18/2019    VITD25 28.9 12/28/2018       Hypertension  Currently on. Norvasc 5 mg. Coreg25 mg, HCTZ 25 mg BOP elevated at visit, denies symptoms of dizziness, light headedness. Occasional dependent edema. Tries to follow a salt restricted diet.    Lab Results   Component Value Date     07/27/2020    K 5.0 07/27/2020    CL 101 07/27/2020    CO2 30 07/27/2020    BUN 32 (H) 07/27/2020    CREATININE 0.8 07/27/2020    GLUCOSE 117 (H) 07/27/2020    CALCIUM 9.3 07/27/2020    PROT 6.4 07/27/2020    LABALBU 3.7 07/27/2020    BILITOT 0.3 07/27/2020    ALKPHOS 61 07/27/2020     (H) 07/27/2020     (H) 07/27/2020    LABGLOM >60 07/27/2020    GFRAA >60 07/27/2020    AGRATIO 1.4 07/27/2020    GLOB 2.7 07/27/2020       The ASCVD Risk score (Harveyjesús Alvarez., et al., 2013) failed to calculate for the following reasons: The valid total cholesterol range is 130 to 320 mg/dL    Past Medical History:   Diagnosis Date    Asthma     Chronic kidney disease     Hyperlipidemia     Hypertension     Myotonia     Type II or unspecified type diabetes mellitus without mention of complication, not stated as uncontrolled      Family History   Problem Relation Age of Onset    Diabetes Father      Review of Systems   Constitutional: Negative for activity change, appetite change, diaphoresis, fever and unexpected weight change. HENT: Negative for dental problem. Eyes: Negative for pain and visual disturbance. Respiratory: Negative for shortness of breath. Cardiovascular: Negative for chest pain, palpitations and leg swelling. Gastrointestinal: Negative for constipation, diarrhea and nausea. Endocrine: Negative for cold intolerance, heat intolerance, polydipsia, polyphagia and polyuria. Genitourinary: Negative for frequency and urgency. Musculoskeletal: Negative for arthralgias, joint swelling and myalgias. Skin: Negative for color change and pallor. Neurological: Negative for weakness, numbness and headaches. Psychiatric/Behavioral: Negative for dysphoric mood and sleep disturbance. The patient is not nervous/anxious.       Vitals:    10/28/20 0803   BP: 128/66   Pulse: 78   Resp: 14   Temp: 98.5 °F (36.9 °C)   TempSrc: Temporal   SpO2: 96%   Weight: 187 lb (84.8 kg)   Height: 5' 2\" (1.575 m)       Physical Exam Constitutional: She is oriented to person, place, and time. She appears well-developed and well-nourished. Eyes: Pupils are equal, round, and reactive to light. Neck: Normal range of motion. No thyromegaly present. Cardiovascular: Normal rate, regular rhythm and normal heart sounds. Pulmonary/Chest: Effort normal and breath sounds normal.   Abdominal: She exhibits no distension. Musculoskeletal: Normal range of motion. General: No edema. Neurological: She is alert and oriented to person, place, and time. No sensory deficit. Vibration Sense 10/10 as compared to UE  Pedal Pulses 2+   Skin: Skin is warm and dry. No skin changes or evidence of trauma. Psychiatric: She has a normal mood and affect. Her behavior is normal. Thought content normal.   Vitals reviewed.       Skeletal foot exam: defered patient request.        Diabetes Continuous Glucose Monitoring Report      Reason for Study:   - Poorly controlled DM without success with standard interventions   - Glucose variability      Current Therapy:   Farxiga 5 mg                                                    Tresiba 30 units   Humalog 5-20 U AC TID--> 150-200 : 5 units, 3:50 > 200  Metformin 1000 mg BID     CGMS Report   CGMS Device: Freestyle Danya Pro  Data collection from 10/3/18 to 10/10/18  Range set @   Average reading 128 mg/dL   Above target range 6%  In target range 92%  Below target range 2%      Impression:   Postprandial hyperglycemia at lunch and dinner  Occasional nocturnal hypoglycemia     Recommendation:   Repeat A1c at visit today: not done, most recent is 10.2 (A1c Goal < 7% )  Insulin Change: increase Tresiba to 36 units  Dietary Recommendations: reinforced low processed carb diet, ok to ensure good fats and moderate proteins  Repeat IPRO: in 6 months or PRN      Assessment  Sisi Calvo is a 61 y.o. female with uncontrolled diabetes mellitus type 2 associated with hypothyroidism,  HTN, HLD and obesity and Vitamin D deficiency    Plan  Problem List Items Addressed This Visit     Acquired hypothyroidism - Primary    Relevant Orders    T4, Free    TSH without Reflex    HLD (hyperlipidemia)     LDL goal  < 100; recent at 64  TG elevated at 153  Continue current regimen  Managed by PCP           Relevant Orders    Lipid Panel    HTN (hypertension)     Blood pressure controlled. Continue current regimen         Relevant Orders    Comprehensive Metabolic Panel    Obesity     Reviewed carbohyrate and caloric restriction  Emphasis on former at this time  Ensure good hydration and adequate electrolyte replacement  BMI > 33         Osteoporosis, post-menopausal     DEXA in 10/2019-->Osteopenia  Discussed vitamin D and calcium supplementation  Lab Results   Component Value Date    VITD25 33.3 05/01/2020   Calcium 9.5           Type 2 diabetes mellitus ([facility] [address]     Lab Results   Component Value Date    LABA1C 6.4 10/28/2020     At goal A1c  No change in medication    Insulin  Discussed insulin titration  Has dropped to 20 units Tresiba, 10 units humalog at dinner    Diet :   30-40 grams per meal , 3 meals per day, avoid carbs in snack choices  Include moderate proteins and good fats   Ensure adequate hydration and  electrolyte replacement    Exercise :  Recommended exercise is 5-7 days a week for 30-60 mins at least, per day OR a total of 2.5 hours per week , which ever is more feasible. Diabetic Health Maintenance  Follow up with annual eye exams  Follow up with annual podiatry exams if needed  Reviewed sick day management of BG     Other areas of Diabetic Education reviewed:   Carbs: good carbs and bad carbs, importance of carb counting, incorporation of protein with each meal to reduce Glycemic index, importance of portions, Carb/insulin ratio   Fats: Good fats and bad fats, meal planning and supplements.  Discussed how food affects blood sugar readings.     Insulin pumps, how they work and how they affect blood sugar levels   Steroids and effects on blood sugars   Different diabetic medications   Managing high and low sugar readings   Effects of smoking and diabetes   Rotation of sites for subcutaneous medication injection           Relevant Orders    POCT glycosylated hemoglobin (Hb A1C) (Completed)    Lipid Panel    Vitamin D deficiency     Lab Results   Component Value Date    VITD25 33.3 05/01/2020     Continue to supplement  recheck in 3 months           Greater than 30 minutes spent directly counseling patient about topics listed above (such as lifestyle modifications, preventative screenings and/or disease related processes). Return in about 3 months (around 1/28/2021).

## 2020-10-28 NOTE — ASSESSMENT & PLAN NOTE
Lab Results   Component Value Date    LABA1C 6.4 10/28/2020     At goal A1c  No change in medication    Insulin  Discussed insulin titration  Has dropped to 20 units Tresiba, 10 units humalog at dinner    Diet :   30-40 grams per meal , 3 meals per day, avoid carbs in snack choices  Include moderate proteins and good fats   Ensure adequate hydration and  electrolyte replacement    Exercise :  Recommended exercise is 5-7 days a week for 30-60 mins at least, per day OR a total of 2.5 hours per week , which ever is more feasible. Diabetic Health Maintenance  Follow up with annual eye exams  Follow up with annual podiatry exams if needed  Reviewed sick day management of BG     Other areas of Diabetic Education reviewed:   Carbs: good carbs and bad carbs, importance of carb counting, incorporation of protein with each meal to reduce Glycemic index, importance of portions, Carb/insulin ratio   Fats: Good fats and bad fats, meal planning and supplements.  Discussed how food affects blood sugar readings.     Insulin pumps, how they work and how they affect blood sugar levels   Steroids and effects on blood sugars   Different diabetic medications   Managing high and low sugar readings   Effects of smoking and diabetes   Rotation of sites for subcutaneous medication injection

## 2020-11-13 NOTE — PROGRESS NOTES
[address]   Cardiac Consultation    Referring Provider:  Diego Marx MD     Chief Complaint   Patient presents with    1 Year Follow Up    Hyperlipidemia    Hypertension        History of Present Illness:   Aman Brown is a 72 y.o. female who is here for follow up for a history of coronary artery disease, hypertension, hyperlipidemia, chronic renal insufficiency, and diabetes. Renal artery US normal in 2017. An echocardiogram from 2017 showed an EF of 55%. Today she states she has been feeling well overall. She is tolerating her medications. Her blood pressure at home runs 120-130/60's. Normal at her PCP one month ago. She does not see it over 140 at home. She remains active working. Patient currently denies any weight gain, edema, palpitations, chest pain, shortness of breath, dizziness, and syncope. Past Medical History:   has a past medical history of Asthma, Chronic kidney disease, Hyperlipidemia, Hypertension, Myotonia, and Type II or unspecified type diabetes mellitus without mention of complication, not stated as uncontrolled. Surgical History:   has a past surgical history that includes Spine surgery (1995); Hysterectomy; and US BIOPSY LIVER PERCUTANEOUS (8/14/2020). Social History:   reports that she has never smoked. She has never used smokeless tobacco. She reports that she does not drink alcohol or use drugs. Family History:  family history includes Diabetes in her father. Home Medications:  Prior to Admission medications    Medication Sig Start Date End Date Taking?  Authorizing Provider   amLODIPine (NORVASC) 5 MG tablet TAKE 1 TABLET BY MOUTH DAILY 10/23/20  Yes Neno Leung MD   carvedilol (COREG) 25 MG tablet TAKE 1 TABLET BY MOUTH TWICE DAILY 10/23/20  Yes Neno Leung MD   SYNTHROID 50 MCG tablet Take one tablet daily and one additional tablet per week for a total of 8 tablets per week 9/3/20  Yes Jake Jones, APRN - CNP hydroCHLOROthiazide (HYDRODIURIL) 25 MG tablet TAKE 1 TABLET BY MOUTH DAILY 8/12/20  Yes Uziel Ya MD   Selenium (SELENIMIN PO) Take by mouth   Yes Historical Provider, MD SCHMITT FLEXTOUCH 200 UNIT/ML SOPN INJECT 50 UNITS UNDER THE SKIN NIGHTLY 7/10/20  Yes SHIRA Lua CNP   metFORMIN (GLUCOPHAGE) 1000 MG tablet TAKE 1 TABLET BY MOUTH TWICE DAILY WITH MEALS 7/10/20  Yes Uziel Ya MD   TRULICITY 1.5 OX/8.7XR SOPN INJECT 1.5 MG INTO THE SKIN ONCE EVERY WEEK 6/15/20  Yes Del Tom MD   Ertugliflozin L-PyroglutamicAc (STEGLATRO) 15 MG TABS Take 1 tablet by mouth daily  Patient taking differently: Take 0.5 tablets by mouth daily  4/22/20  Yes SHIRA Lua CNP   HUMALOG KWIKPEN 100 UNIT/ML SOPN ADMINISTER 5 TO 20 UNITS UNDER THE SKIN THREE TIMES DAILY BEFORE MEALS 4/9/20  Yes Uziel Ya MD   Cyanocobalamin (B-12) 100 MCG TABS    Yes Historical Provider, MD   vitamin D (CHOLECALCIFEROL) 1000 UNIT TABS tablet Take 1,000 Units by mouth daily   Yes Historical Provider, MD   Multiple Vitamins-Minerals (MULTIVITAMIN WOMEN) TABS Take by mouth daily   Yes Historical Provider, MD   Magnesium 400 MG TABS Take by mouth nightly   Yes Historical Provider, MD   cetirizine (ZYRTEC) 10 MG tablet Take 10 mg by mouth daily   Yes Historical Provider, MD   CINNAMON PO Take by mouth   Yes Historical Provider, MD        Allergies:  Lisinopril; Norvasc [amlodipine besylate]; Proventil [albuterol sulfate]; and Tetanus toxoids     Review of Systems:   · Constitutional: there has been no unanticipated weight loss. There's been no change in energy level, sleep pattern, or activity level. · Eyes: No visual changes or diplopia. No scleral icterus. · ENT: No Headaches, hearing loss or vertigo. No mouth sores or sore throat. · Cardiovascular: Reviewed in HPI  · Respiratory: No cough or wheezing, no sputum production. No hematemesis. · Gastrointestinal: No abdominal pain, appetite loss, blood in stools. No change in bowel or bladder habits. · Genitourinary: No dysuria, trouble voiding, or hematuria. · Musculoskeletal:  No gait disturbance, weakness or joint complaints. · Integumentary: No rash or pruritis. · Neurological: No headache, diplopia, change in muscle strength, numbness or tingling. No change in gait, balance, coordination, mood, affect, memory, mentation, behavior. · Psychiatric: No anxiety, no depression. · Endocrine: No malaise, fatigue or temperature intolerance. No excessive thirst, fluid intake, or urination. No tremor. · Hematologic/Lymphatic: No abnormal bruising or bleeding, blood clots or swollen lymph nodes. · Allergic/Immunologic: No nasal congestion or hives. Physical Examination:    Vitals:    11/16/20 0736   BP: (!) 154/68   Pulse:    SpO2:         Constitutional and General Appearance: NAD   Respiratory:  · Normal excursion and expansion without use of accessory muscles  · Resp Auscultation: Normal breath sounds without dullness  Cardiovascular:  · The apical impulses not displaced  · Heart tones are crisp and normal  · Cervical veins are not engorged  · The carotid upstroke is normal in amplitude and contour without delay or bruit  · Normal S1S2, No S3, No Murmur  · Peripheral pulses are symmetrical and full  · There is no clubbing, cyanosis of the extremities. · No edema  · Femoral Arteries: 2+ and equal  · Pedal Pulses: 2+ and equal   Abdomen:  · No masses or tenderness  · Liver/Spleen: No Abnormalities Noted  Neurological/Psychiatric:  · Alert and oriented in all spheres  · Moves all extremities well  · Exhibits normal gait balance and coordination  · No abnormalities of mood, affect, memory, mentation, or behavior are noted      Renal artery duplex 9/12/17  1. Technically limited exam due to depth and bowel gas. 2. Within the limits of this exam, there is no evidence to suggest renal  artery stenosis bilaterally.   3. Resistive indexes in the bilateral upper and lower

## 2020-11-16 ENCOUNTER — OFFICE VISIT (OUTPATIENT)
Dept: CARDIOLOGY CLINIC | Age: 65
End: 2020-11-16
Payer: COMMERCIAL

## 2020-11-16 VITALS
OXYGEN SATURATION: 95 % | SYSTOLIC BLOOD PRESSURE: 154 MMHG | WEIGHT: 190 LBS | HEART RATE: 72 BPM | BODY MASS INDEX: 34.96 KG/M2 | HEIGHT: 62 IN | DIASTOLIC BLOOD PRESSURE: 68 MMHG

## 2020-11-16 PROCEDURE — 99214 OFFICE O/P EST MOD 30 MIN: CPT | Performed by: INTERNAL MEDICINE

## 2020-11-16 NOTE — LETTER
[address]   Cardiac Consultation    Referring Provider:  Silva Yoon MD     Chief Complaint   Patient presents with    1 Year Follow Up    Hyperlipidemia    Hypertension        History of Present Illness:   Chance Mccabe is a 72 y.o. female who is here for follow up for a history of coronary artery disease, hypertension, hyperlipidemia, chronic renal insufficiency, and diabetes. Renal artery US normal in 2017. An echocardiogram from 2017 showed an EF of 55%. Today she states she has been feeling well overall. She is tolerating her medications. Her blood pressure at home runs 120-130/60's. Normal at her PCP one month ago. She does not see it over 140 at home. She remains active working. Patient currently denies any weight gain, edema, palpitations, chest pain, shortness of breath, dizziness, and syncope. Past Medical History:   has a past medical history of Asthma, Chronic kidney disease, Hyperlipidemia, Hypertension, Myotonia, and Type II or unspecified type diabetes mellitus without mention of complication, not stated as uncontrolled. Surgical History:   has a past surgical history that includes Spine surgery (1995); Hysterectomy; and US BIOPSY LIVER PERCUTANEOUS (8/14/2020). Social History:   reports that she has never smoked. She has never used smokeless tobacco. She reports that she does not drink alcohol or use drugs. Family History:  family history includes Diabetes in her father. Home Medications:  Prior to Admission medications    Medication Sig Start Date End Date Taking?  Authorizing Provider   amLODIPine (NORVASC) 5 MG tablet TAKE 1 TABLET BY MOUTH DAILY 10/23/20  Yes Ishan Solano MD   carvedilol (COREG) 25 MG tablet TAKE 1 TABLET BY MOUTH TWICE DAILY 10/23/20  Yes Ishan Solano MD   SYNTHROID 50 MCG tablet Take one tablet daily and one additional tablet per week for a total of 8 tablets per week 9/3/20  Yes Jodi Morris APRN - CNP hydroCHLOROthiazide (HYDRODIURIL) 25 MG tablet TAKE 1 TABLET BY MOUTH DAILY 8/12/20  Yes Smita Apple MD   Selenium (SELENIMIN PO) Take by mouth   Yes Historical Provider, MD   TREDAVID FLEXTOUCH 200 UNIT/ML SOPN INJECT 50 UNITS UNDER THE SKIN NIGHTLY 7/10/20  Yes SHIRA Carrera CNP   metFORMIN (GLUCOPHAGE) 1000 MG tablet TAKE 1 TABLET BY MOUTH TWICE DAILY WITH MEALS 7/10/20  Yes Smita Apple MD   TRULICITY 1.5 MS/0.1PN SOPN INJECT 1.5 MG INTO THE SKIN ONCE EVERY WEEK 6/15/20  Yes Russ Grimes MD   Ertugliflozin L-PyroglutamicAc (STEGLATRO) 15 MG TABS Take 1 tablet by mouth daily  Patient taking differently: Take 0.5 tablets by mouth daily  4/22/20  Yes SHIRA Carrera CNP   HUMALOG KWIKPEN 100 UNIT/ML SOPN ADMINISTER 5 TO 20 UNITS UNDER THE SKIN THREE TIMES DAILY BEFORE MEALS 4/9/20  Yes Smita Apple MD   Cyanocobalamin (B-12) 100 MCG TABS    Yes Historical Provider, MD   vitamin D (CHOLECALCIFEROL) 1000 UNIT TABS tablet Take 1,000 Units by mouth daily   Yes Historical Provider, MD   Multiple Vitamins-Minerals (MULTIVITAMIN WOMEN) TABS Take by mouth daily   Yes Historical Provider, MD   Magnesium 400 MG TABS Take by mouth nightly   Yes Historical Provider, MD   cetirizine (ZYRTEC) 10 MG tablet Take 10 mg by mouth daily   Yes Historical Provider, MD   CINNAMON PO Take by mouth   Yes Historical Provider, MD        Allergies:  Lisinopril; Norvasc [amlodipine besylate]; Proventil [albuterol sulfate]; and Tetanus toxoids     Review of Systems:   · Constitutional: there has been no unanticipated weight loss. There's been no change in energy level, sleep pattern, or activity level. · Eyes: No visual changes or diplopia. No scleral icterus. · ENT: No Headaches, hearing loss or vertigo. No mouth sores or sore throat. · Cardiovascular: Reviewed in HPI  · Respiratory: No cough or wheezing, no sputum production. No hematemesis. · Gastrointestinal: No abdominal pain, appetite loss, blood in stools. No change in bowel or bladder habits. · Genitourinary: No dysuria, trouble voiding, or hematuria. · Musculoskeletal:  No gait disturbance, weakness or joint complaints. · Integumentary: No rash or pruritis. · Neurological: No headache, diplopia, change in muscle strength, numbness or tingling. No change in gait, balance, coordination, mood, affect, memory, mentation, behavior. · Psychiatric: No anxiety, no depression. · Endocrine: No malaise, fatigue or temperature intolerance. No excessive thirst, fluid intake, or urination. No tremor. · Hematologic/Lymphatic: No abnormal bruising or bleeding, blood clots or swollen lymph nodes. · Allergic/Immunologic: No nasal congestion or hives. Physical Examination:    Vitals:    11/16/20 0736   BP: (!) 154/68   Pulse:    SpO2:         Constitutional and General Appearance: NAD   Respiratory:  · Normal excursion and expansion without use of accessory muscles  · Resp Auscultation: Normal breath sounds without dullness  Cardiovascular:  · The apical impulses not displaced  · Heart tones are crisp and normal  · Cervical veins are not engorged  · The carotid upstroke is normal in amplitude and contour without delay or bruit  · Normal S1S2, No S3, No Murmur  · Peripheral pulses are symmetrical and full  · There is no clubbing, cyanosis of the extremities. · No edema  · Femoral Arteries: 2+ and equal  · Pedal Pulses: 2+ and equal   Abdomen:  · No masses or tenderness  · Liver/Spleen: No Abnormalities Noted  Neurological/Psychiatric:  · Alert and oriented in all spheres  · Moves all extremities well  · Exhibits normal gait balance and coordination  · No abnormalities of mood, affect, memory, mentation, or behavior are noted      Renal artery duplex 9/12/17  1. Technically limited exam due to depth and bowel gas.   2. Within the limits of this exam, there is no evidence to suggest renal  artery stenosis bilaterally. 3. Resistive indexes in the bilateral upper and lower poles are within  normal limits      Echocardiogram 9/17/17  Normal left ventricle systolic function with an estimated ejection fraction  of 55%. No regional wall motion abnormalities are seen. Normal left ventricular diastolic filling pressure. No significant valvular heart disease. Systolic pulmonary artery pressure (SPAP) is normal and estimated at 24 mmHg  (RA pressure 3 mmHg). Assessment:     1. Essential hypertension - elevated today but states always normal at home    2. Mixed hyperlipidemia - well controlled. Results reviewed w/ pt   3. Type 2 diabetes mellitus - followed by pcp    4. CRI (chronic renal insufficiency), unspecified stage    5. Intolerant to lisinopril. She believes due to kidney isuues. Discussed R/B. Remain off. Plan:  Continue current medications   Check blood pressure at home daily   Regular exercise and following a healthy diet encouraged   Follow up with me in 1 year     Scribe's attestation: This note was scribed in the presence of Dr. Pooja Antunez M.D. By Shruti Panchal RN    The scribes documentation has been prepared under my direction and personally reviewed by me in its entirety. I confirm that the note above accurately reflects all work, treatment, procedures, and medical decision making performed by me. MD Og Wynneva Doc.  Ida Morel M.D., Shaquille Walker

## 2020-11-16 NOTE — PATIENT INSTRUCTIONS
Plan:  Continue current medications   Check blood pressure at home daily   Regular exercise and following a healthy diet encouraged   Follow up with me in 1 year

## 2020-11-20 ENCOUNTER — HOSPITAL ENCOUNTER (OUTPATIENT)
Age: 65
Discharge: HOME | End: 2020-11-20
Payer: COMMERCIAL

## 2020-11-20 LAB
A/G RATIO: 1.3 (ref 1.1–2.2)
ALBUMIN SERPL-MCNC: 4 G/DL (ref 3.4–5)
ALP BLD-CCNC: 61 U/L (ref 40–129)
ALT SERPL-CCNC: 137 U/L (ref 10–40)
ANION GAP SERPL CALCULATED.3IONS-SCNC: 9 MMOL/L (ref 3–16)
AST SERPL-CCNC: 89 U/L (ref 15–37)
BILIRUB SERPL-MCNC: 0.3 MG/DL (ref 0–1)
BUN BLDV-MCNC: 25 MG/DL (ref 7–20)
CALCIUM SERPL-MCNC: 9.4 MG/DL (ref 8.3–10.6)
CHLORIDE BLD-SCNC: 100 MMOL/L (ref 99–110)
CHOLESTEROL, TOTAL: 243 MG/DL (ref 0–199)
CO2: 32 MMOL/L (ref 21–32)
CREAT SERPL-MCNC: 0.8 MG/DL (ref 0.6–1.2)
GFR AFRICAN AMERICAN: >60
GFR NON-AFRICAN AMERICAN: >60
GLOBULIN: 3 G/DL
GLUCOSE BLD-MCNC: 110 MG/DL (ref 70–99)
HDLC SERPL-MCNC: 57 MG/DL (ref 40–60)
LDL CHOLESTEROL CALCULATED: 149 MG/DL
POTASSIUM SERPL-SCNC: 4.7 MMOL/L (ref 3.5–5.1)
SODIUM BLD-SCNC: 141 MMOL/L (ref 136–145)
T4 FREE: 1.3 NG/DL (ref 0.9–1.8)
TOTAL PROTEIN: 7 G/DL (ref 6.4–8.2)
TRIGL SERPL-MCNC: 186 MG/DL (ref 0–150)
TSH SERPL DL<=0.05 MIU/L-ACNC: 6.08 UIU/ML (ref 0.27–4.2)
VLDLC SERPL CALC-MCNC: 37 MG/DL

## 2020-11-20 PROCEDURE — 80053 COMPREHEN METABOLIC PANEL: CPT

## 2020-11-20 PROCEDURE — 84439 ASSAY OF FREE THYROXINE: CPT

## 2020-11-20 PROCEDURE — 80061 LIPID PANEL: CPT

## 2020-11-20 PROCEDURE — 36415 COLL VENOUS BLD VENIPUNCTURE: CPT

## 2020-11-20 PROCEDURE — 84443 ASSAY THYROID STIM HORMONE: CPT

## 2020-11-25 RX ORDER — DULAGLUTIDE 1.5 MG/.5ML
INJECTION, SOLUTION SUBCUTANEOUS
Qty: 2 ML | Refills: 2 | Status: SHIPPED | OUTPATIENT
Start: 2020-11-25 | Stop reason: SDUPTHER

## 2020-11-28 NOTE — TELEPHONE ENCOUNTER
LOV: 10/9/19  NOV: 4/15/20        Dose: 1 tablet QD  Strength: 25 mcg  Route: Oral Scribe Attestation (For Scribes USE Only)... Scribe Attestation (For Scribes USE Only).../Attending Attestation (For Attendings USE Only)...

## 2020-12-01 RX ORDER — LEVOTHYROXINE SODIUM 50 MCG
TABLET ORAL
Qty: 34 TABLET | Refills: 1 | Status: SHIPPED | OUTPATIENT
Start: 2020-12-01 | End: 2021-01-25 | Stop reason: SDUPTHER

## 2020-12-07 LAB — DIABETIC RETINOPATHY: NEGATIVE

## 2020-12-28 RX ORDER — DULAGLUTIDE 1.5 MG/.5ML
1.5 INJECTION, SOLUTION SUBCUTANEOUS WEEKLY
Qty: 2 ML | Refills: 1 | Status: SHIPPED | OUTPATIENT
Start: 2020-12-28 | End: 2021-04-28 | Stop reason: SDUPTHER

## 2020-12-28 NOTE — TELEPHONE ENCOUNTER
Trulicity 0.79 mg --> 1.5 mg, per last office visit 10/28/2020  LOV  10/28/2020        NOV   1/13/2021

## 2021-01-13 ENCOUNTER — OFFICE VISIT (OUTPATIENT)
Dept: ENDOCRINOLOGY | Age: 66
End: 2021-01-13
Payer: COMMERCIAL

## 2021-01-13 ENCOUNTER — HOSPITAL ENCOUNTER (OUTPATIENT)
Age: 66
Discharge: HOME | End: 2021-01-13
Payer: COMMERCIAL

## 2021-01-13 VITALS
WEIGHT: 189 LBS | BODY MASS INDEX: 34.78 KG/M2 | OXYGEN SATURATION: 97 % | DIASTOLIC BLOOD PRESSURE: 72 MMHG | SYSTOLIC BLOOD PRESSURE: 122 MMHG | HEIGHT: 62 IN | HEART RATE: 79 BPM

## 2021-01-13 DIAGNOSIS — Z79.4 TYPE 2 DIABETES MELLITUS WITH DIABETIC NEPHROPATHY, WITH LONG-TERM CURRENT USE OF INSULIN (HCC): ICD-10-CM

## 2021-01-13 DIAGNOSIS — E78.2 MIXED HYPERLIPIDEMIA: ICD-10-CM

## 2021-01-13 DIAGNOSIS — E55.9 VITAMIN D DEFICIENCY: ICD-10-CM

## 2021-01-13 DIAGNOSIS — Z79.4 TYPE 2 DIABETES MELLITUS WITH DIABETIC NEPHROPATHY, WITH LONG-TERM CURRENT USE OF INSULIN (HCC): Primary | ICD-10-CM

## 2021-01-13 DIAGNOSIS — M81.0 OSTEOPOROSIS, POST-MENOPAUSAL: ICD-10-CM

## 2021-01-13 DIAGNOSIS — E03.9 ACQUIRED HYPOTHYROIDISM: ICD-10-CM

## 2021-01-13 DIAGNOSIS — I10 ESSENTIAL HYPERTENSION: ICD-10-CM

## 2021-01-13 DIAGNOSIS — E11.21 TYPE 2 DIABETES MELLITUS WITH DIABETIC NEPHROPATHY, WITH LONG-TERM CURRENT USE OF INSULIN (HCC): Primary | ICD-10-CM

## 2021-01-13 DIAGNOSIS — E11.21 TYPE 2 DIABETES MELLITUS WITH DIABETIC NEPHROPATHY, WITH LONG-TERM CURRENT USE OF INSULIN (HCC): ICD-10-CM

## 2021-01-13 DIAGNOSIS — E66.9 CLASS 1 OBESITY WITH SERIOUS COMORBIDITY AND BODY MASS INDEX (BMI) OF 34.0 TO 34.9 IN ADULT, UNSPECIFIED OBESITY TYPE: ICD-10-CM

## 2021-01-13 LAB
A/G RATIO: 1.1 (ref 1.1–2.2)
ALBUMIN SERPL-MCNC: 3.7 G/DL (ref 3.4–5)
ALP BLD-CCNC: 56 U/L (ref 40–129)
ALT SERPL-CCNC: 80 U/L (ref 10–40)
ANION GAP SERPL CALCULATED.3IONS-SCNC: 9 MMOL/L (ref 3–16)
AST SERPL-CCNC: 56 U/L (ref 15–37)
BILIRUB SERPL-MCNC: 0.3 MG/DL (ref 0–1)
BUN BLDV-MCNC: 24 MG/DL (ref 7–20)
CALCIUM SERPL-MCNC: 9.5 MG/DL (ref 8.3–10.6)
CHLORIDE BLD-SCNC: 101 MMOL/L (ref 99–110)
CHOLESTEROL, TOTAL: 224 MG/DL (ref 0–199)
CO2: 30 MMOL/L (ref 21–32)
CREAT SERPL-MCNC: 1 MG/DL (ref 0.6–1.2)
CREATININE URINE: 84.6 MG/DL (ref 28–259)
GFR AFRICAN AMERICAN: >60
GFR NON-AFRICAN AMERICAN: 56
GLOBULIN: 3.3 G/DL
GLUCOSE BLD-MCNC: 117 MG/DL (ref 70–99)
HBA1C MFR BLD: 6.6 %
HDLC SERPL-MCNC: 50 MG/DL (ref 40–60)
LDL CHOLESTEROL CALCULATED: 126 MG/DL
MICROALBUMIN UR-MCNC: 1.7 MG/DL
MICROALBUMIN/CREAT UR-RTO: 20.1 MG/G (ref 0–30)
POTASSIUM SERPL-SCNC: 4.8 MMOL/L (ref 3.5–5.1)
SODIUM BLD-SCNC: 140 MMOL/L (ref 136–145)
T4 FREE: 1.5 NG/DL (ref 0.9–1.8)
TOTAL PROTEIN: 7 G/DL (ref 6.4–8.2)
TRIGL SERPL-MCNC: 240 MG/DL (ref 0–150)
TSH SERPL DL<=0.05 MIU/L-ACNC: 6.46 UIU/ML (ref 0.27–4.2)
VITAMIN D 25-HYDROXY: 40.6 NG/ML
VLDLC SERPL CALC-MCNC: 48 MG/DL

## 2021-01-13 PROCEDURE — 80061 LIPID PANEL: CPT

## 2021-01-13 PROCEDURE — 82570 ASSAY OF URINE CREATININE: CPT

## 2021-01-13 PROCEDURE — 83036 HEMOGLOBIN GLYCOSYLATED A1C: CPT | Performed by: NURSE PRACTITIONER

## 2021-01-13 PROCEDURE — 36415 COLL VENOUS BLD VENIPUNCTURE: CPT

## 2021-01-13 PROCEDURE — 82043 UR ALBUMIN QUANTITATIVE: CPT

## 2021-01-13 PROCEDURE — 82306 VITAMIN D 25 HYDROXY: CPT

## 2021-01-13 PROCEDURE — 84439 ASSAY OF FREE THYROXINE: CPT

## 2021-01-13 PROCEDURE — 80053 COMPREHEN METABOLIC PANEL: CPT

## 2021-01-13 PROCEDURE — 99214 OFFICE O/P EST MOD 30 MIN: CPT | Performed by: NURSE PRACTITIONER

## 2021-01-13 PROCEDURE — 84443 ASSAY THYROID STIM HORMONE: CPT

## 2021-01-13 ASSESSMENT — ENCOUNTER SYMPTOMS
NAUSEA: 0
EYE PAIN: 0
CONSTIPATION: 0
SHORTNESS OF BREATH: 0
COLOR CHANGE: 0
DIARRHEA: 0

## 2021-01-13 NOTE — PROGRESS NOTES
@ 6.4%  BG is 90 -120  TSH significantly elevated. Patient stopped levothyroxine per self  Liver enzymes very elevated, followed by GI    HPI on 7/22/2020  A1c is maintained @ 6.6%  BG is 90 -120  90 day average was 136  TSH significantly elevated. Patient stopped levothyroxine per self  Liver enzymes very elevated, followed by GI  Myotonia     HPI on 4/22/2020  Reports doing well  Has not had falls in 4 months  BG is 120-130 fasting; checks approx 1 hour > dinner in 160-170s  Stopped taking levothyroxine as could not get refill per patient  Could not get [place] rx filled    HPI on 10/9/19  Doing well; appetite suppression  Does low carb options; tolerates medication ok  Reports significant fatigue; occasional falls. Unable to bowl due to fatigue    Lab Results   Component Value Date    .7 05/01/2020    .6 12/28/2018    .4 06/27/2018     Current Medication regimen:   Steglatro 15 mg QD ( takes 1/2 tab frequently)                                             Tresiba 30 units -- has not titrated--> 24 units--> 20 units  Humalog 5-20 U AC TID--> 150-200 : 10 units, 2:50 > 150: typically doses BID with 10 units --> takes QD with dinner only now--> 10 units QD with dinner  Metformin 2512 mg BID  Trulicity 1.96 mg --> 1.5 mg    GFR > 60    Diet:  Typically eat one meal a day: vegetables, meat, rice, soups (cream of potato). --> has improved diet and decreased overall carb intake  Coffee with no sugar, lots of water, lite beer usually 1/day  Reports financial constraints that prevent making better food choices    Microvascular Complications:  · Neuropathy denies concerns  · Retinopathy no concerns with vision, field of vision  · Nephropathy  Component    Latest Ref Rng & Units 6/27/2018   Microalbumin, Random Urine    <2.0 mg/dL <1.20   Creatinine, Ur    28.0 - 259.0 mg/dL 75.1   Microalbumin Creatinine Ratio    0.0 - 30.0 mg/g see below     Diabetic Health Maintenance   · Last Eye Exam: 11/2018--> next headedness. Occasional dependent edema. Tries to follow a salt restricted diet. Lab Results   Component Value Date     11/20/2020    K 4.7 11/20/2020     11/20/2020    CO2 32 11/20/2020    BUN 25 (H) 11/20/2020    CREATININE 0.8 11/20/2020    GLUCOSE 110 (H) 11/20/2020    CALCIUM 9.4 11/20/2020    PROT 7.0 11/20/2020    LABALBU 4.0 11/20/2020    BILITOT 0.3 11/20/2020    ALKPHOS 61 11/20/2020    AST 89 (H) 11/20/2020     (H) 11/20/2020    LABGLOM >60 11/20/2020    GFRAA >60 11/20/2020    AGRATIO 1.3 11/20/2020    GLOB 3.0 11/20/2020       The 10-year ASCVD risk score (Toy Shaver et al., 2013) is: 13.8%    Values used to calculate the score:      Age: 72 years      Sex: Female      Is Non- : No      Diabetic: Yes      Tobacco smoker: No      Systolic Blood Pressure: 310 mmHg      Is BP treated: Yes      HDL Cholesterol: 57 mg/dL      Total Cholesterol: 243 mg/dL    Past Medical History:   Diagnosis Date    Asthma     Chronic kidney disease     Hyperlipidemia     Hypertension     Myotonia     Type II or unspecified type diabetes mellitus without mention of complication, not stated as uncontrolled      Family History   Problem Relation Age of Onset    Diabetes Father      Review of Systems   Constitutional: Negative for activity change, appetite change, diaphoresis, fever and unexpected weight change. HENT: Negative for dental problem. Eyes: Negative for pain and visual disturbance. Respiratory: Negative for shortness of breath. Cardiovascular: Negative for chest pain, palpitations and leg swelling. Gastrointestinal: Negative for constipation, diarrhea and nausea. Endocrine: Negative for cold intolerance, heat intolerance, polydipsia, polyphagia and polyuria. Genitourinary: Negative for frequency and urgency. Musculoskeletal: Negative for arthralgias, joint swelling and myalgias. Skin: Negative for color change and pallor.    Neurological: Negative for weakness, numbness and headaches. Psychiatric/Behavioral: Negative for dysphoric mood and sleep disturbance. The patient is not nervous/anxious. Vitals:    01/13/21 0817   BP: 122/72   Pulse: 79   SpO2: 97%   Weight: 189 lb (85.7 kg)   Height: 5' 2\" (1.575 m)       Physical Exam   Constitutional: She is oriented to person, place, and time. She appears well-developed and well-nourished. Eyes: Pupils are equal, round, and reactive to light. Neck: Normal range of motion. No thyromegaly present. Cardiovascular: Normal rate, regular rhythm and normal heart sounds. Pulmonary/Chest: Effort normal and breath sounds normal.   Abdominal: She exhibits no distension. Musculoskeletal: Normal range of motion. General: No edema. Neurological: She is alert and oriented to person, place, and time. No sensory deficit. Skin: Skin is warm and dry. No skin changes or evidence of trauma. Psychiatric: She has a normal mood and affect. Her behavior is normal. Thought content normal.   Vitals reviewed.       Skeletal foot exam: defered patient request.        Diabetes Continuous Glucose Monitoring Report      Reason for Study:   - Poorly controlled DM without success with standard interventions   - Glucose variability      Current Therapy:   Farxiga 5 mg                                                    Tresiba 30 units   Humalog 5-20 U AC TID--> 150-200 : 5 units, 3:50 > 200  Metformin 1000 mg BID     CGMS Report   CGMS Device: Freestyle Danya Pro  Data collection from 10/3/18 to 10/10/18  Range set @   Average reading 128 mg/dL   Above target range 6%  In target range 92%  Below target range 2%      Impression:   Postprandial hyperglycemia at lunch and dinner  Occasional nocturnal hypoglycemia     Recommendation:   Repeat A1c at visit today: not done, most recent is 10.2 (A1c Goal < 7% )  Insulin Change: increase Tresiba to 36 units  Dietary Recommendations: reinforced low processed carb diet, ok to ensure good fats and moderate proteins  Repeat IPRO: in 6 months or PRN      Assessment  Susanna Slade is a 61 y.o. female with uncontrolled diabetes mellitus type 2 associated with hypothyroidism,  HTN, HLD and obesity and Vitamin D deficiency    Plan  Diabetes Mellitus Type 2  Lab Results   Component Value Date    LABA1C 6.6 01/13/2021     Goal A1c  < 6.5%  Medication: no change  Insulin: reviewed titration  Avoid hypoglycemia    Diet :   30-40 grams per meal , 3 meals per day, avoid carbs in snack choices  Include moderate proteins and good fats   Ensure adequate hydration and  electrolyte replacement    Exercise :  Recommended exercise is 5-7 days a week for 30-60 mins at least, per day OR a total of 2.5 hours per week , which ever is more feasible. Ambulate as possible     Diabetic Health Maintenance  Follow up with annual eye exams  Follow up with annual podiatry exams if needed  Reviewed sick day management of BG     Other areas of Diabetic Education reviewed:   Carbs: good carbs and bad carbs, importance of carb counting, incorporation of protein with each meal to reduce Glycemic index, importance of portions, Carb/insulin ratio   Fats: Good fats and bad fats, meal planning and supplements.  Discussed how food affects blood sugar readings.  Different diabetic medications   Managing high and low sugar readings   Rotation of sites for subcutaneous medication injection    Mixed Hyperlipidemia  Lab Results   Component Value Date    LDLDIRECT 87 04/03/2019    LDLDIRECT 66 10/02/2018    LDLDIRECT 127 09/17/2013     Lab Results   Component Value Date    TRIG 186 11/20/2020    TRIG 153 07/27/2020    TRIG 249 01/27/2020     LDL goal  < 100;   TG elevated at   Continue current regimen  Managed by PCP    Essential Hypertension  Blood pressure controlled.  Continue current regimen    Vitamin D deficiency  Lab Results   Component Value Date    VITD25 33.3 05/01/2020     Continue to supplement Recheck level in 3 months    Problem List Items Addressed This Visit     Acquired hypothyroidism    Relevant Orders    T4, Free    TSH without Reflex    HLD (hyperlipidemia)    Relevant Orders    Lipid Panel    HTN (hypertension)    Relevant Orders    Comprehensive Metabolic Panel    Obesity    Osteoporosis, post-menopausal    Relevant Orders    Vitamin D 25 Hydroxy    Type 2 diabetes mellitus ([facility] [address] - Primary    Relevant Orders    POCT glycosylated hemoglobin (Hb A1C) (Completed)    Comprehensive Metabolic Panel    Lipid Panel    Microalbumin / Creatinine Urine Ratio    T4, Free    TSH without Reflex    Vitamin D deficiency    Relevant Orders    Vitamin D 25 Hydroxy          Greater than 40 minutes spent directly counseling patient about topics listed above (such as lifestyle modifications, preventative screenings and/or disease related processes. Return in about 6 months (around 7/13/2021).

## 2021-01-25 DIAGNOSIS — R79.89 HIGH SERUM THYROID STIMULATING HORMONE (TSH): ICD-10-CM

## 2021-01-25 DIAGNOSIS — E11.21 TYPE 2 DIABETES MELLITUS WITH DIABETIC NEPHROPATHY, WITH LONG-TERM CURRENT USE OF INSULIN (HCC): ICD-10-CM

## 2021-01-25 DIAGNOSIS — Z79.4 TYPE 2 DIABETES MELLITUS WITH DIABETIC NEPHROPATHY, WITH LONG-TERM CURRENT USE OF INSULIN (HCC): ICD-10-CM

## 2021-01-25 RX ORDER — LEVOTHYROXINE SODIUM 50 MCG
TABLET ORAL
Qty: 34 TABLET | Refills: 5 | Status: SHIPPED | OUTPATIENT
Start: 2021-01-25 | End: 2021-09-29 | Stop reason: DRUGHIGH

## 2021-01-25 RX ORDER — DULAGLUTIDE 1.5 MG/.5ML
INJECTION, SOLUTION SUBCUTANEOUS
Qty: 2 ML | Refills: 2 | Status: SHIPPED | OUTPATIENT
Start: 2021-01-25 | End: 2021-01-29 | Stop reason: SDUPTHER

## 2021-01-25 NOTE — TELEPHONE ENCOUNTER
Medication:   Requested Prescriptions     Pending Prescriptions Disp Refills    SYNTHROID 50 MCG tablet 34 tablet 1     Sig: Take one tablet daily and one additional tablet per week for a total of 8 tablets per week         Last appt: 01/13/2021  Next appt: 07/07/2021    Last Thyroid:   Lab Results   Component Value Date    TSH 6.46 01/13/2021    FT3 3.1 05/01/2020    T4FREE 1.5 01/13/2021

## 2021-01-25 NOTE — TELEPHONE ENCOUNTER
Refill Request METFORMIN 1000MG TABLETS    Last Seen: 6/29/2018  Last Written: 7/10/20 180 tablets with 1 refill    Next Appointment:   Future Appointments   Date Time Provider Alfredo Avery   1/29/2021  9:00 AM Jordan Vasquez MD [facility]   7/7/2021  8:40 AM David Beckman, APRN - CNP AND ENDO [facility]   11/22/2021  7:30 AM [facility]umber[facility] Marks [facility]             Requested Prescriptions     Pending Prescriptions Disp Refills    metFORMIN (GLUCOPHAGE) 1000 MG tablet [Pharmacy Med Name: METFORMIN 1000MG TABLETS] 180 tablet 1     Sig: TAKE 1 TABLET BY MOUTH TWICE DAILY WITH MEALS

## 2021-01-29 ENCOUNTER — OFFICE VISIT (OUTPATIENT)
Dept: FAMILY MEDICINE CLINIC | Age: 66
End: 2021-01-29
Payer: COMMERCIAL

## 2021-01-29 VITALS
HEIGHT: 62 IN | WEIGHT: 189 LBS | BODY MASS INDEX: 34.78 KG/M2 | TEMPERATURE: 96.5 F | SYSTOLIC BLOOD PRESSURE: 140 MMHG | HEART RATE: 76 BPM | DIASTOLIC BLOOD PRESSURE: 60 MMHG | OXYGEN SATURATION: 99 %

## 2021-01-29 DIAGNOSIS — E11.21 TYPE 2 DIABETES MELLITUS WITH DIABETIC NEPHROPATHY, WITH LONG-TERM CURRENT USE OF INSULIN (HCC): ICD-10-CM

## 2021-01-29 DIAGNOSIS — I10 ESSENTIAL HYPERTENSION: Primary | ICD-10-CM

## 2021-01-29 DIAGNOSIS — L30.9 DERMATITIS: ICD-10-CM

## 2021-01-29 DIAGNOSIS — R79.89 ELEVATED LFTS: ICD-10-CM

## 2021-01-29 DIAGNOSIS — N18.9 CHRONIC RENAL IMPAIRMENT, UNSPECIFIED CKD STAGE: ICD-10-CM

## 2021-01-29 DIAGNOSIS — E78.2 MIXED HYPERLIPIDEMIA: ICD-10-CM

## 2021-01-29 DIAGNOSIS — Z79.4 TYPE 2 DIABETES MELLITUS WITH DIABETIC NEPHROPATHY, WITH LONG-TERM CURRENT USE OF INSULIN (HCC): ICD-10-CM

## 2021-01-29 PROCEDURE — 99214 OFFICE O/P EST MOD 30 MIN: CPT | Performed by: FAMILY MEDICINE

## 2021-01-29 RX ORDER — LEVOTHYROXINE SODIUM 50 MCG
TABLET ORAL
Qty: 34 TABLET | Refills: 5 | Status: CANCELLED | OUTPATIENT
Start: 2021-01-29

## 2021-01-29 ASSESSMENT — PATIENT HEALTH QUESTIONNAIRE - PHQ9
SUM OF ALL RESPONSES TO PHQ9 QUESTIONS 1 & 2: 0
SUM OF ALL RESPONSES TO PHQ QUESTIONS 1-9: 0
2. FEELING DOWN, DEPRESSED OR HOPELESS: 0

## 2021-01-29 NOTE — PROGRESS NOTES
SUBJECTIVE:  Cameron Guzman is a 72 y.o. female who presents for evaluation of type 2 diabetes, CAD, CKD, hypothyroid, elevated liver function, myotonia hypertension and hyperlipidemia. She indicates that she is feeling well and denies any symptoms referable to her elevated blood pressure. Specifically denies chest pain, palpitations, dyspnea, orthopnea, PND or peripheral edema   No anorexia,  or leg cramps noted. Current medication regimen is as listed below. She denies any side effects of medication, and has been taking it regularly. Lab Results   Component Value Date    LDLCALC 126 (H) 01/13/2021    LDLDIRECT 87 04/03/2019       Patient's chart was reviewed. In November she saw cardiology. She is maintained on amlodipine, carvedilol and hydrochlorothiazide for her blood pressure. Patient states at home her blood pressure has been ranging about 767 systolic. She is not due back to cardiology until November. She sees endocrinology for management of her diabetes and hypothyroidism. Patient's last note was reviewed in January. Her lab work was done January 21, 2021. Her labs were reviewed. Her A1c was 6.6. TSH was slightly elevated at 6.46, her thyroid dose has been adjusted by endocrinology. In August, patient's LFTs were high. She had a liver biopsy which showed portal fibrosis. In January her numbers were still elevated but much improved. Last ALT was 80, last AST 56. This BUN/creatinine 24 1.0. From laboratory standpoint, patient's conditions are well controlled. Today she complains of a rash that she has had since September 2020. She says it is very itchy. She has been using oatmeal baths and lotions. She says that she uses rubbing alcohol on her skin and then she puts on another cream.  She says when she gets stressed her itching is worse. Primarily is on her foot forearm and chest and upper back.     Current Outpatient Medications   Medication Sig Dispense Refill  metFORMIN (GLUCOPHAGE) 1000 MG tablet TAKE 1 TABLET BY MOUTH TWICE DAILY WITH MEALS 180 tablet 1    SYNTHROID 50 MCG tablet Take one tablet daily and one additional tablet per week for a total of 8 tablets per week 34 tablet 5    Dulaglutide (TRULICITY) 1.5 JU/4.2JO SOPN Inject 1.5 mg into the skin once a week 2 mL 1    amLODIPine (NORVASC) 5 MG tablet TAKE 1 TABLET BY MOUTH DAILY 90 tablet 1    carvedilol (COREG) 25 MG tablet TAKE 1 TABLET BY MOUTH TWICE DAILY 180 tablet 1    hydroCHLOROthiazide (HYDRODIURIL) 25 MG tablet TAKE 1 TABLET BY MOUTH DAILY 90 tablet 1    Selenium (SELENIMIN PO) Take by mouth      TRESIBA FLEXTOUCH 200 UNIT/ML SOPN INJECT 50 UNITS UNDER THE SKIN NIGHTLY 9 mL 2    Ertugliflozin L-PyroglutamicAc (STEGLATRO) 15 MG TABS Take 1 tablet by mouth daily (Patient taking differently: Take 0.5 tablets by mouth daily ) 30 tablet 11    HUMALOG KWIKPEN 100 UNIT/ML SOPN ADMINISTER 5 TO 20 UNITS UNDER THE SKIN THREE TIMES DAILY BEFORE MEALS 15 mL 5    Cyanocobalamin (B-12) 100 MCG TABS       vitamin D (CHOLECALCIFEROL) 1000 UNIT TABS tablet Take 1,000 Units by mouth daily      Multiple Vitamins-Minerals (MULTIVITAMIN WOMEN) TABS Take by mouth daily      Magnesium 400 MG TABS Take by mouth nightly      cetirizine (ZYRTEC) 10 MG tablet Take 10 mg by mouth daily      CINNAMON PO Take by mouth       No current facility-administered medications for this visit.         Allergies   Allergen Reactions    Lisinopril     Norvasc [Amlodipine Besylate] Other (See Comments)     edema    Proventil [Albuterol Sulfate] Other (See Comments)     Paralyzed vocal cords    Tetanus Toxoids        Social History     Tobacco Use    Smoking status: Never Smoker    Smokeless tobacco: Never Used   Substance Use Topics    Alcohol use: No       OBJECTIVE:   BP (!) 140/60   Pulse 76   Temp 96.5 °F (35.8 °C) (Temporal)   Ht 5' 2\" (1.575 m)   Wt 189 lb (85.7 kg)   SpO2 99%   BMI 34.57 kg/m²   NAD Neck no bruit or JVD  S1 and S2 normal, no murmurs, clicks, gallops or rubs. Regular rate and rhythm. Chest is clear; no wheezes or rales. No edema or JVD. Skin exam.  She has papular excoriated thickened eruption on her upper chest and upper back. There are no skin findings on her forearm. Neuro alert, no CN or motor deficits  Psych nl mood, thought and judgement    ASSESSMENT:   Diagnosis Orders   1. Essential hypertension, stable continue same medicine    2. Mixed hyperlipidemia, currently off statin due to elevated liver function    3. Elevated LFTs, improved continue follow-up with GI    4. Type 2 diabetes mellitus with diabetic nephropathy, with long-term current use of insulin ([facility] [address]   plan per endocrinology   5. Chronic renal impairment, unspecified CKD stage   currently stable on current regimen   6. Dermatitis   patient was referred to dermatology. I think that her skin eruption needs to be biopsied. Also she was instructed not to use rubbing alcohol on her skin. If her skin is itchy and she put rubbing alcohol it is going to make it more dried out and more itchy        Plan:  1)  Medication: continue current medication regimen unchanged  2)  Recheck in 6 months, sooner should new symptoms or problems arise.   3) LLR, A1c and TSH reviewed

## 2021-02-22 DIAGNOSIS — I10 ESSENTIAL HYPERTENSION: ICD-10-CM

## 2021-02-23 RX ORDER — HYDROCHLOROTHIAZIDE 25 MG/1
25 TABLET ORAL DAILY
Qty: 90 TABLET | Refills: 1 | Status: SHIPPED | OUTPATIENT
Start: 2021-02-23 | End: 2021-08-23

## 2021-02-23 NOTE — TELEPHONE ENCOUNTER
Ko Costello [phone] (home) [phone] (work)   is requesting refill(s) of medication Hydrochlorothiazide to preferred pharmacy [facility] 1/29/21 (pertaining to medication)   Last refill 8/12/20 (per medication requested)  Next office visit scheduled or attempted Yes  Date 8/4/21  If No, reason

## 2021-04-26 RX ORDER — CARVEDILOL 25 MG/1
TABLET ORAL
Qty: 180 TABLET | Refills: 1 | Status: SHIPPED | OUTPATIENT
Start: 2021-04-26 | End: 2021-10-21

## 2021-04-26 RX ORDER — AMLODIPINE BESYLATE 5 MG/1
5 TABLET ORAL DAILY
Qty: 90 TABLET | Refills: 1 | Status: SHIPPED | OUTPATIENT
Start: 2021-04-26 | End: 2021-10-21

## 2021-04-28 ENCOUNTER — TELEPHONE (OUTPATIENT)
Dept: ENDOCRINOLOGY | Age: 66
End: 2021-04-28

## 2021-04-28 DIAGNOSIS — Z79.4 TYPE 2 DIABETES MELLITUS WITH DIABETIC NEPHROPATHY, WITH LONG-TERM CURRENT USE OF INSULIN (HCC): ICD-10-CM

## 2021-04-28 DIAGNOSIS — E11.21 TYPE 2 DIABETES MELLITUS WITH DIABETIC NEPHROPATHY, WITH LONG-TERM CURRENT USE OF INSULIN (HCC): ICD-10-CM

## 2021-04-28 RX ORDER — ERTUGLIFLOZIN 15 MG/1
0.5 TABLET, FILM COATED ORAL DAILY
Refills: 3 | Status: CANCELLED | OUTPATIENT
Start: 2021-04-28

## 2021-04-28 NOTE — TELEPHONE ENCOUNTER
Patient needs a refill on her tresiba, trulicity and [facility] [address] [place] [address] -  [phone]

## 2021-04-29 DIAGNOSIS — E11.21 TYPE 2 DIABETES MELLITUS WITH DIABETIC NEPHROPATHY, WITH LONG-TERM CURRENT USE OF INSULIN (HCC): ICD-10-CM

## 2021-04-29 DIAGNOSIS — Z79.4 TYPE 2 DIABETES MELLITUS WITH DIABETIC NEPHROPATHY, WITH LONG-TERM CURRENT USE OF INSULIN (HCC): ICD-10-CM

## 2021-04-29 RX ORDER — INSULIN DEGLUDEC 200 U/ML
INJECTION, SOLUTION SUBCUTANEOUS
Qty: 9 ML | Refills: 3 | Status: SHIPPED | OUTPATIENT
Start: 2021-04-29

## 2021-04-29 RX ORDER — ERTUGLIFLOZIN 15 MG/1
1 TABLET, FILM COATED ORAL DAILY
Qty: 30 TABLET | Refills: 11 | Status: SHIPPED | OUTPATIENT
Start: 2021-04-29 | End: 2022-02-09

## 2021-04-29 RX ORDER — DULAGLUTIDE 1.5 MG/.5ML
1.5 INJECTION, SOLUTION SUBCUTANEOUS WEEKLY
Qty: 2 ML | Refills: 3 | Status: SHIPPED | OUTPATIENT
Start: 2021-04-29 | End: 2021-12-08 | Stop reason: SDUPTHER

## 2021-06-03 ENCOUNTER — TELEPHONE (OUTPATIENT)
Dept: ENDOCRINOLOGY | Age: 66
End: 2021-06-03

## 2021-06-03 DIAGNOSIS — E11.21 TYPE 2 DIABETES MELLITUS WITH DIABETIC NEPHROPATHY, WITH LONG-TERM CURRENT USE OF INSULIN (HCC): Primary | ICD-10-CM

## 2021-06-03 DIAGNOSIS — R79.89 HIGH SERUM THYROID STIMULATING HORMONE (TSH): ICD-10-CM

## 2021-06-03 DIAGNOSIS — E78.2 MIXED HYPERLIPIDEMIA: ICD-10-CM

## 2021-06-03 DIAGNOSIS — Z79.4 TYPE 2 DIABETES MELLITUS WITH DIABETIC NEPHROPATHY, WITH LONG-TERM CURRENT USE OF INSULIN (HCC): Primary | ICD-10-CM

## 2021-06-03 DIAGNOSIS — E03.9 ACQUIRED HYPOTHYROIDISM: ICD-10-CM

## 2021-06-03 NOTE — TELEPHONE ENCOUNTER
Patient wanted to know if she could get labs done. She said Rosario Yuan told her last time she saw her to get some. She is off tomorrow and would like to go if the orders can be put in today.

## 2021-06-04 ENCOUNTER — HOSPITAL ENCOUNTER (OUTPATIENT)
Age: 66
Discharge: HOME | End: 2021-06-04
Payer: COMMERCIAL

## 2021-06-04 DIAGNOSIS — E78.2 MIXED HYPERLIPIDEMIA: ICD-10-CM

## 2021-06-04 DIAGNOSIS — Z79.4 TYPE 2 DIABETES MELLITUS WITH DIABETIC NEPHROPATHY, WITH LONG-TERM CURRENT USE OF INSULIN (HCC): ICD-10-CM

## 2021-06-04 DIAGNOSIS — E11.21 TYPE 2 DIABETES MELLITUS WITH DIABETIC NEPHROPATHY, WITH LONG-TERM CURRENT USE OF INSULIN (HCC): ICD-10-CM

## 2021-06-04 DIAGNOSIS — E03.9 ACQUIRED HYPOTHYROIDISM: ICD-10-CM

## 2021-06-04 LAB
A/G RATIO: 1.2 (ref 1.1–2.2)
ALBUMIN SERPL-MCNC: 3.9 G/DL (ref 3.4–5)
ALP BLD-CCNC: 63 U/L (ref 40–129)
ALT SERPL-CCNC: 39 U/L (ref 10–40)
ANION GAP SERPL CALCULATED.3IONS-SCNC: 11 MMOL/L (ref 3–16)
AST SERPL-CCNC: 37 U/L (ref 15–37)
BILIRUB SERPL-MCNC: 0.3 MG/DL (ref 0–1)
BUN BLDV-MCNC: 20 MG/DL (ref 7–20)
CALCIUM SERPL-MCNC: 9.1 MG/DL (ref 8.3–10.6)
CHLORIDE BLD-SCNC: 100 MMOL/L (ref 99–110)
CHOLESTEROL, TOTAL: 242 MG/DL (ref 0–199)
CO2: 28 MMOL/L (ref 21–32)
CREAT SERPL-MCNC: 1 MG/DL (ref 0.6–1.2)
CREATININE URINE: 143.3 MG/DL (ref 28–259)
GFR AFRICAN AMERICAN: >60
GFR NON-AFRICAN AMERICAN: 55
GLOBULIN: 3.2 G/DL
GLUCOSE BLD-MCNC: 159 MG/DL (ref 70–99)
HDLC SERPL-MCNC: 45 MG/DL (ref 40–60)
LDL CHOLESTEROL CALCULATED: ABNORMAL MG/DL
LDL CHOLESTEROL DIRECT: 167 MG/DL
MICROALBUMIN UR-MCNC: 10.5 MG/DL
MICROALBUMIN/CREAT UR-RTO: 73.3 MG/G (ref 0–30)
POTASSIUM SERPL-SCNC: 4.3 MMOL/L (ref 3.5–5.1)
SODIUM BLD-SCNC: 139 MMOL/L (ref 136–145)
T4 FREE: 1.4 NG/DL (ref 0.9–1.8)
TOTAL PROTEIN: 7.1 G/DL (ref 6.4–8.2)
TRIGL SERPL-MCNC: 304 MG/DL (ref 0–150)
TSH SERPL DL<=0.05 MIU/L-ACNC: 5.72 UIU/ML (ref 0.27–4.2)
VLDLC SERPL CALC-MCNC: ABNORMAL MG/DL

## 2021-06-04 PROCEDURE — 83036 HEMOGLOBIN GLYCOSYLATED A1C: CPT

## 2021-06-04 PROCEDURE — 80061 LIPID PANEL: CPT

## 2021-06-04 PROCEDURE — 80053 COMPREHEN METABOLIC PANEL: CPT

## 2021-06-04 PROCEDURE — 84443 ASSAY THYROID STIM HORMONE: CPT

## 2021-06-04 PROCEDURE — 84439 ASSAY OF FREE THYROXINE: CPT

## 2021-06-04 PROCEDURE — 82043 UR ALBUMIN QUANTITATIVE: CPT

## 2021-06-04 PROCEDURE — 82570 ASSAY OF URINE CREATININE: CPT

## 2021-06-04 PROCEDURE — 36415 COLL VENOUS BLD VENIPUNCTURE: CPT

## 2021-06-05 LAB
ESTIMATED AVERAGE GLUCOSE: 157.1 MG/DL
HBA1C MFR BLD: 7.1 %

## 2021-06-08 LAB — DIABETIC RETINOPATHY: NEGATIVE

## 2021-06-23 ENCOUNTER — VIRTUAL VISIT (OUTPATIENT)
Dept: ENDOCRINOLOGY | Age: 66
End: 2021-06-23
Payer: COMMERCIAL

## 2021-06-23 DIAGNOSIS — E78.2 MIXED HYPERLIPIDEMIA: ICD-10-CM

## 2021-06-23 DIAGNOSIS — E11.21 TYPE 2 DIABETES MELLITUS WITH DIABETIC NEPHROPATHY, WITH LONG-TERM CURRENT USE OF INSULIN (HCC): Primary | ICD-10-CM

## 2021-06-23 DIAGNOSIS — E66.9 CLASS 1 OBESITY WITH SERIOUS COMORBIDITY AND BODY MASS INDEX (BMI) OF 34.0 TO 34.9 IN ADULT, UNSPECIFIED OBESITY TYPE: ICD-10-CM

## 2021-06-23 DIAGNOSIS — M81.0 OSTEOPOROSIS, POST-MENOPAUSAL: ICD-10-CM

## 2021-06-23 DIAGNOSIS — E03.9 ACQUIRED HYPOTHYROIDISM: ICD-10-CM

## 2021-06-23 DIAGNOSIS — E55.9 VITAMIN D DEFICIENCY: ICD-10-CM

## 2021-06-23 DIAGNOSIS — Z79.4 TYPE 2 DIABETES MELLITUS WITH DIABETIC NEPHROPATHY, WITH LONG-TERM CURRENT USE OF INSULIN (HCC): Primary | ICD-10-CM

## 2021-06-23 DIAGNOSIS — I10 ESSENTIAL HYPERTENSION: ICD-10-CM

## 2021-06-23 PROCEDURE — 99214 OFFICE O/P EST MOD 30 MIN: CPT | Performed by: NURSE PRACTITIONER

## 2021-06-23 PROCEDURE — 3051F HG A1C>EQUAL 7.0%<8.0%: CPT | Performed by: NURSE PRACTITIONER

## 2021-06-23 RX ORDER — FENOFIBRATE 48 MG/1
48 TABLET, COATED ORAL DAILY
Qty: 30 TABLET | Refills: 3 | Status: SHIPPED | OUTPATIENT
Start: 2021-06-23 | End: 2021-10-26

## 2021-06-23 ASSESSMENT — ENCOUNTER SYMPTOMS
SHORTNESS OF BREATH: 0
EYE PAIN: 0
NAUSEA: 0
COLOR CHANGE: 0
DIARRHEA: 0
CONSTIPATION: 0

## 2021-06-23 NOTE — PROGRESS NOTES
Endocrinology  Yumiko Henry, DEREK, [address] [facility] [address] [facility]  [place], [address]  Phone [phone]  Fax [phone]    Ayan Lucero is  being evaluated by a Virtual Visit (video visit) encounter to address concerns as mentioned above. Due to this being a TeleHealth encounter (During YEUAE-10 public health emergency), evaluation of the following organ systems was limited: Vitals/Constitutional/EENT/Resp/CV/GI//MS/Neuro/Skin/Heme-Lymph-Imm. Pursuant to the emergency declaration under the [address] [place] [address], [address] [place] [address] authority and the [employer] Act, this Virtual Visit was conducted with patient's (and/or legal guardian's) consent, to reduce the patient's risk of exposure to COVID-19 and provide necessary medical care. The patient (and/or legal guardian) has also been advised to contact this office for worsening conditions or problems, and seek emergency medical treatment and/or call 911 if deemed necessary. Services were provided through a video synchronous discussion virtually to substitute for in-person clinic visit. Patient and provider were located at their individual homes    Patient was identified via name,  on the video visit    Ayan Lucero is a 72 y.o. female who is following up for management of Diabetes Mellitus Type 2.     PCP: Cely Blake MD    Last A1C:   Lab Results   Component Value Date    LABA1C 7.1 2021    LABA1C 6.6 2021    LABA1C 6.4 10/28/2020     Last BP Readings:   BP Readings from Last 3 Encounters:   21 (!) 140/60   21 122/72   20 (!) 154/68     Last LDL:   Lab Results   Component Value Date    LDLCALC see below 2021    LDLDIRECT 167 (H) 2021     Aspirin Use: no    Tobacco History:    Smoking status: Never Smoker    Smokeless tobacco: Never Used    Alcohol use No     Diabetes:  Ayan Lucero  has been diabetic since 1996 and on insulin for 2 years. Patient reports that diabetes is generally not well controlled. Disease course has been stable but uncontrolled. Patient reports compliance for about 80% of the time and adheres to medication, but usually not to diet and exercise instructions. There have been no recent hospital or ED admissions for hypoglycemia, hyperglycemia or DKA. Has had 3 back surgeries:  Last in 2002, chronic back pain, sciatica. DMITRIY in 1996 when she was 41. Was on HRT for 10 years ago  Neck surgery (C5 and C6 ) in 1995    Patient brought no or glucometer at this visit  Readings per day  4 per patient report--> now checking 2 x daily per patient: in morning and after dinner  Average reading 150  Lowest in past 2 weeks 98--> 96 ( usually low 100s)  Highest in past 2 weeks 202--> 176  Hypoglycemia awareness and symptoms: no--> last week and ok after drinking OJ. Did not check BG  CGM done  Usual carbs per meal:does not count carbs    HPI on 6/23/2021  TSH slightly elevated: synthroid 8 pills per week, tolerating well  DEXA osteopenia: on calcium and vitamin D supplementation    Lab Results   Component Value Date    .1 06/04/2021    .7 05/01/2020    .6 12/28/2018     Current Medication regimen:   Steglatro 15 mg QD ( takes 1/2 tab frequently)                                             Tresiba 30 units -- has not titrated--> 24 units--> 20 units--> 30 units  Humalog 5-20 U AC TID--> 150-200 : 10 units, 2:50 > 150: typically doses BID with 10 units --> takes QD with dinner only now--> 10 units QD with dinner  Metformin 6637 mg BID  Trulicity 1.02 mg --> 1.5 mg  GFR > 60    Diet:  Typically eat one meal a day: vegetables, meat, rice, soups (cream of potato). --> has improved diet and decreased overall carb intake  Coffee with no sugar, lots of water, lite beer usually 1/day or only on weekends  Reports financial constraints that prevent making better food choices    Microvascular Complications:  · Neuropathy denies concerns  · Retinopathy no concerns with vision, field of vision  · Nephropathy  Component    Latest Ref Rng & Units 6/27/2018   Microalbumin, Random Urine    <2.0 mg/dL <1.20   Creatinine, Ur    28.0 - 259.0 mg/dL 75.1   Microalbumin Creatinine Ratio    0.0 - 30.0 mg/g see below     Diabetic Health Maintenance   · Last Eye Exam: 11/2018--> next visit in 01/2021  · Last Foot exam: with PCP  · Has patient seen a dietitian? Yes  · Current Exercise: No structured exercise  · On ACEI or ARB: no; on norvasc and coreg  ·     Risk Factors  · Smoker: no  · ETOH:neglible  · Co-morbid conditions: HTN, HLD, Obesity, CKD    Hypothyroidism  Stopped levothryoxine 50 mcg per self: is doing better with synthroid brand at same dose  States it made her fall, however she has myotonia and has imbalance issues prior to this  Discussed latest results  Patient denies denies fatigue, weight changes, heat/cold intolerance, bowel/skin changes or CVS symptoms. Lab Results   Component Value Date    TSH 5.72 06/04/2021    TSH 6.46 01/13/2021    TSH 6.08 11/20/2020    FT3 3.1 05/01/2020    FT3 2.6 07/03/2019    T4FREE 1.4 06/04/2021    T4FREE 1.5 01/13/2021    T4FREE 1.3 11/20/2020       Hyperlipidemia: Currently is on Atorvastatin 40 mg. Current complaints include occasional myalgias but otherwise tolerates well.   Lab Results   Component Value Date    CHOL 242 06/04/2021    CHOL 224 01/13/2021    CHOL 243 11/20/2020     Lab Results   Component Value Date    TRIG 304 06/04/2021    TRIG 240 01/13/2021    TRIG 186 11/20/2020     Lab Results   Component Value Date    HDL 45 06/04/2021    HDL 50 01/13/2021    HDL 57 11/20/2020    HDL 47 03/20/2012    HDL 59 09/20/2011    HDL 49 12/17/2010     Lab Results   Component Value Date    LDLCALC see below 06/04/2021    LDLCALC 126 01/13/2021    LDLCALC 149 11/20/2020     Lab Results   Component Value Date    LDLDIRECT 167 06/04/2021    LDLDIRECT 87 04/03/2019 LDLDIRECT 66 10/02/2018     No results found for: JACKIE    Vitamin D deficiency: Currently is on no supplementation. Current complaints include fatigue on daily basis. Last vitamin D level is:  Lab Results   Component Value Date    VITD25 40.6 01/13/2021    VITD25 33.3 05/01/2020    VITD25 23.3 10/18/2019       Hypertension  Currently on. Norvasc 5 mg. Coreg25 mg, HCTZ 25 mg BOP elevated at visit, denies symptoms of dizziness, light headedness. Occasional dependent edema. Tries to follow a salt restricted diet. Lab Results   Component Value Date     06/04/2021    K 4.3 06/04/2021     06/04/2021    CO2 28 06/04/2021    BUN 20 06/04/2021    CREATININE 1.0 06/04/2021    GLUCOSE 159 (H) 06/04/2021    CALCIUM 9.1 06/04/2021    PROT 7.1 06/04/2021    LABALBU 3.9 06/04/2021    BILITOT 0.3 06/04/2021    ALKPHOS 63 06/04/2021    AST 37 06/04/2021    ALT 39 06/04/2021    LABGLOM 55 (A) 06/04/2021    GFRAA >60 06/04/2021    AGRATIO 1.2 06/04/2021    GLOB 3.2 06/04/2021       The 10-year ASCVD risk score (Jung Hogan, et al., 2013) is: 19.5%    Values used to calculate the score:      Age: 72 years      Sex: Female      Is Non- : No      Diabetic: Yes      Tobacco smoker: No      Systolic Blood Pressure: 703 mmHg      Is BP treated: Yes      HDL Cholesterol: 45 mg/dL      Total Cholesterol: 242 mg/dL    Past Medical History:   Diagnosis Date    Asthma     Chronic kidney disease     Hyperlipidemia     Hypertension     Myotonia     Type II or unspecified type diabetes mellitus without mention of complication, not stated as uncontrolled      Family History   Problem Relation Age of Onset    Diabetes Father      Review of Systems   Constitutional: Negative for activity change, appetite change, diaphoresis, fever and unexpected weight change. HENT: Negative for dental problem. Eyes: Negative for pain and visual disturbance. Respiratory: Negative for shortness of breath. Cardiovascular: Negative for chest pain, palpitations and leg swelling. Gastrointestinal: Negative for constipation, diarrhea and nausea. Endocrine: Negative for cold intolerance, heat intolerance, polydipsia, polyphagia and polyuria. Genitourinary: Negative for frequency and urgency. Musculoskeletal: Negative for arthralgias, joint swelling and myalgias. Skin: Negative for color change and pallor. Neurological: Negative for weakness, numbness and headaches. Psychiatric/Behavioral: Negative for dysphoric mood and sleep disturbance. The patient is not nervous/anxious. There were no vitals filed for this visit.  televisit    Physical Exam televisit    Skeletal foot exam: defered patient request.        Diabetes Continuous Glucose Monitoring Report      Reason for Study:   - Poorly controlled DM without success with standard interventions   - Glucose variability      Current Therapy:   Farxiga 5 mg                                                    Tresiba 30 units   Humalog 5-20 U AC TID--> 150-200 : 5 units, 3:50 > 200  Metformin 1000 mg BID     CGMS Report   CGMS Device: Freestyle Danya Pro  Data collection from 10/3/18 to 10/10/18  Range set @   Average reading 128 mg/dL   Above target range 6%  In target range 92%  Below target range 2%      Impression:   Postprandial hyperglycemia at lunch and dinner  Occasional nocturnal hypoglycemia     Recommendation:   Repeat A1c at visit today: not done, most recent is 10.2 (A1c Goal < 7% )  Insulin Change: increase Tresiba to 36 units  Dietary Recommendations: reinforced low processed carb diet, ok to ensure good fats and moderate proteins  Repeat IPRO: in 6 months or PRN      Assessment  Pradip Vega is a 61 y.o. female with uncontrolled diabetes mellitus type 2 associated with hypothyroidism,  HTN, HLD and obesity and Vitamin D deficiency    Plan  Diabetes Mellitus Type 2  Lab Results   Component Value Date    LABA1C 7.1 06/04/2021     Goal

## 2021-06-24 DIAGNOSIS — E11.21 TYPE 2 DIABETES MELLITUS WITH DIABETIC NEPHROPATHY, WITH LONG-TERM CURRENT USE OF INSULIN (HCC): ICD-10-CM

## 2021-06-24 DIAGNOSIS — Z79.4 TYPE 2 DIABETES MELLITUS WITH DIABETIC NEPHROPATHY, WITH LONG-TERM CURRENT USE OF INSULIN (HCC): ICD-10-CM

## 2021-06-25 RX ORDER — INSULIN LISPRO 100 [IU]/ML
INJECTION, SOLUTION INTRAVENOUS; SUBCUTANEOUS
Qty: 18 PEN | Refills: 5 | Status: SHIPPED | OUTPATIENT
Start: 2021-06-25

## 2021-06-25 NOTE — TELEPHONE ENCOUNTER
Lawanda Funes is requesting refill(s) humalog  Last OV 1/29/21 (pertaining to medication)  LR 4/9/20 (per medication requested)  Next office visit scheduled or attempted Yes   If no, reason:  8/4/21

## 2021-07-26 NOTE — TELEPHONE ENCOUNTER
Caitlyn Sifuentes is requesting refill(s) metformin  Last OV 1/29/21 (pertaining to medication)  LR 1/25/21 (per medication requested)  Next office visit scheduled or attempted Yes   If no, reason:  8/4/21

## 2021-08-04 ENCOUNTER — OFFICE VISIT (OUTPATIENT)
Dept: FAMILY MEDICINE CLINIC | Age: 66
End: 2021-08-04
Payer: COMMERCIAL

## 2021-08-04 VITALS
BODY MASS INDEX: 34.85 KG/M2 | DIASTOLIC BLOOD PRESSURE: 62 MMHG | WEIGHT: 189.4 LBS | SYSTOLIC BLOOD PRESSURE: 138 MMHG | HEIGHT: 62 IN | OXYGEN SATURATION: 98 % | HEART RATE: 80 BPM

## 2021-08-04 DIAGNOSIS — E11.21 TYPE 2 DIABETES MELLITUS WITH DIABETIC NEPHROPATHY, WITH LONG-TERM CURRENT USE OF INSULIN (HCC): ICD-10-CM

## 2021-08-04 DIAGNOSIS — Z23 NEED FOR 23-POLYVALENT PNEUMOCOCCAL POLYSACCHARIDE VACCINE: ICD-10-CM

## 2021-08-04 DIAGNOSIS — Z79.4 TYPE 2 DIABETES MELLITUS WITH DIABETIC NEPHROPATHY, WITH LONG-TERM CURRENT USE OF INSULIN (HCC): ICD-10-CM

## 2021-08-04 DIAGNOSIS — R79.89 ELEVATED LFTS: ICD-10-CM

## 2021-08-04 DIAGNOSIS — M62.89 MYOTONIA: ICD-10-CM

## 2021-08-04 DIAGNOSIS — E78.2 MIXED HYPERLIPIDEMIA: ICD-10-CM

## 2021-08-04 DIAGNOSIS — N18.9 CHRONIC RENAL IMPAIRMENT, UNSPECIFIED CKD STAGE: ICD-10-CM

## 2021-08-04 DIAGNOSIS — I10 ESSENTIAL HYPERTENSION: Primary | ICD-10-CM

## 2021-08-04 PROCEDURE — 99214 OFFICE O/P EST MOD 30 MIN: CPT | Performed by: FAMILY MEDICINE

## 2021-08-04 PROCEDURE — 90471 IMMUNIZATION ADMIN: CPT | Performed by: FAMILY MEDICINE

## 2021-08-04 PROCEDURE — 90732 PPSV23 VACC 2 YRS+ SUBQ/IM: CPT | Performed by: FAMILY MEDICINE

## 2021-08-04 PROCEDURE — 3051F HG A1C>EQUAL 7.0%<8.0%: CPT | Performed by: FAMILY MEDICINE

## 2021-08-04 NOTE — PROGRESS NOTES
Alexandru Connell presents for   Chief Complaint   Patient presents with    Diabetes     pt states that she is here for a 6 month f/u, is fasting for bw    Hypertension    Hyperlipidemia        ASSESSMENT:   Diagnosis Orders   1. Essential hypertension, stable on amlodipine, carvedilol and hydrochlorothiazide    2. Mixed hyperlipidemia, statin was discontinued due to elevated liver function tests. Endocrinology started her on fenofibrate    3. Elevated LFTs, currently normal based on labs from 6/21    4. Chronic renal impairment, unspecified CKD stage, latest BUN/creatinine normal at 20 and 1.0    5. Myotonia, slightly improved patient encouraged to follow-up with neurology    6. Type 2 diabetes mellitus with diabetic nephropathy, with long-term current use of insulin ([facility] [address] improved A1c 7 one continue follow-up with Endo    7. Need for 23-polyvalent pneumococcal polysaccharide vaccine, this completes her series Pneumococcal polysaccharide vaccine 23-valent greater than or equal to 3yo subcutaneous/IM        Plan:  1)  Medication: continue current medication regimen unchanged, meds are working and tolerated continue as listed  2)  Recheck in 6 months, sooner should new symptoms or problems arise. 3) LLR          SUBJECTIVE:  Alexandru Connell is a 77 y.o. female who presents for evaluation of diabetes, history of LFTs elevation, history of CKD, myotonia, hypertension and hyperlipidemia. She indicates that she is feeling well and denies any symptoms referable to her elevated blood pressure. Specifically denies chest pain, palpitations, dyspnea, orthopnea, PND or peripheral edema . No anorexia, arthralgia, or leg cramps noted. Current medication regimen is as listed below. She denies any side effects of medication, and has been taking it regularly. Lab Results   Component Value Date    LDLCALC see below 06/04/2021    LDLDIRECT 167 (H) 06/04/2021       Overall, patient is doing well.  She was last seen by me 6 months ago. She has seen endocrinology since then. Her A1c is well controlled at 7.1. She is due back with endocrinology in September. She will have labs prior. I was able to review blood work ordered by Pastora. Patient's LFTs are now in the normal range with an ALT of 39 AST of 37. Patient has a history of renal insufficiency, last BUN and creatinine 20 and 1.0. She monitors her blood pressure at home and she has been getting 112Q to 621 systolic consistently. Patient's myotonia has stabilized. She no longer needs to use a walker. She has not fallen in quite some time. She has learned to adapt to her weakness. She has not seen neurology since March 2020. She was encouraged to schedule an appointment to follow-up with them. She is due to see cardiology in November.  Patient was diagnosed with hypothyroidism by endocrinology they are managing this condition    Current Outpatient Medications   Medication Sig Dispense Refill    metFORMIN (GLUCOPHAGE) 1000 MG tablet TAKE 1 TABLET BY MOUTH TWICE DAILY WITH MEALS 180 tablet 1    HUMALOG KWIKPEN 100 UNIT/ML SOPN ADMINISTER 5 TO 20 UNITS UNDER THE SKIN THREE TIMES DAILY BEFORE MEALS 18 pen 5    fenofibrate (TRICOR) 48 MG tablet Take 1 tablet by mouth daily 30 tablet 3    Dulaglutide (TRULICITY) 1.5 II/3.0RK SOPN Inject 1.5 mg into the skin once a week 2 mL 3    Insulin Degludec (TRESIBA FLEXTOUCH) 200 UNIT/ML SOPN INJECT 36 UNITS UNDER THE SKIN NIGHTLY 9 mL 3    Ertugliflozin L-PyroglutamicAc (STEGLATRO) 15 MG TABS Take 1 tablet by mouth daily 30 tablet 11    amLODIPine (NORVASC) 5 MG tablet TAKE 1 TABLET BY MOUTH DAILY 90 tablet 1    carvedilol (COREG) 25 MG tablet TAKE 1 TABLET BY MOUTH TWICE DAILY 180 tablet 1    hydroCHLOROthiazide (HYDRODIURIL) 25 MG tablet TAKE 1 TABLET BY MOUTH DAILY 90 tablet 1    SYNTHROID 50 MCG tablet Take one tablet daily and one additional tablet per week for a total of 8 tablets per week 34 tablet 5    Selenium (SELENIMIN PO) Take by mouth      Cyanocobalamin (B-12) 100 MCG TABS       vitamin D (CHOLECALCIFEROL) 1000 UNIT TABS tablet Take 1,000 Units by mouth daily      Multiple Vitamins-Minerals (MULTIVITAMIN WOMEN) TABS Take by mouth daily      Magnesium 400 MG TABS Take by mouth nightly      cetirizine (ZYRTEC) 10 MG tablet Take 10 mg by mouth daily      CINNAMON PO Take by mouth       No current facility-administered medications for this visit. Allergies   Allergen Reactions    Lisinopril     Norvasc [Amlodipine Besylate] Other (See Comments)     edema    Proventil [Albuterol Sulfate] Other (See Comments)     Paralyzed vocal cords    Tetanus Toxoids        Social History     Tobacco Use    Smoking status: Never Smoker    Smokeless tobacco: Never Used   Substance Use Topics    Alcohol use: No       OBJECTIVE:   /62   Pulse 80   Ht 5' 2\" (1.575 m)   Wt 189 lb 6.4 oz (85.9 kg)   SpO2 98%   BMI 34.64 kg/m²   NAD  Neck no bruit or JVD  S1 and S2 normal, no murmurs, clicks, gallops or rubs. Regular rate and rhythm. Chest is clear; no wheezes or rales. No edema or JVD.   Neuro alert, no CN  deficits  Psych nl mood, thought and judgement

## 2021-08-22 DIAGNOSIS — I10 ESSENTIAL HYPERTENSION: ICD-10-CM

## 2021-08-23 RX ORDER — HYDROCHLOROTHIAZIDE 25 MG/1
25 TABLET ORAL DAILY
Qty: 90 TABLET | Refills: 1 | Status: SHIPPED | OUTPATIENT
Start: 2021-08-23 | End: 2022-02-18

## 2021-08-23 NOTE — TELEPHONE ENCOUNTER
PM assessment completed. VSS. Patient denies complaints of chest pain or shortness f breath. Patient resting in bed. Voices no needs at this time. Refill Request     Last Seen: 8/4/2021    Last Written: 2/23/21 90 TABS 1 REFILL    Next Appointment:   Future Appointments   Date Time Provider Alfredo Avery   9/29/2021  8:00 AM SHIRA Dye CNP AND [facility]   11/22/2021  7:30 AM MD Linda Leong   2/9/2022  8:00 AM Shreyas Mayo MD Alyssa Hol[facility] [place] [facility]             Requested Prescriptions     Pending Prescriptions Disp Refills    hydroCHLOROthiazide (HYDRODIURIL) 25 MG tablet [Pharmacy Med Name: HYDROCHLOROTHIAZIDE 25MG TABLETS] 90 tablet 1     Sig: TAKE 1 TABLET BY MOUTH DAILY

## 2021-09-24 ENCOUNTER — HOSPITAL ENCOUNTER (OUTPATIENT)
Age: 66
Discharge: HOME | End: 2021-09-24
Payer: COMMERCIAL

## 2021-09-24 DIAGNOSIS — E11.21 TYPE 2 DIABETES MELLITUS WITH DIABETIC NEPHROPATHY, WITH LONG-TERM CURRENT USE OF INSULIN (HCC): ICD-10-CM

## 2021-09-24 DIAGNOSIS — E03.9 ACQUIRED HYPOTHYROIDISM: ICD-10-CM

## 2021-09-24 DIAGNOSIS — E78.2 MIXED HYPERLIPIDEMIA: ICD-10-CM

## 2021-09-24 DIAGNOSIS — I10 ESSENTIAL HYPERTENSION: ICD-10-CM

## 2021-09-24 DIAGNOSIS — Z79.4 TYPE 2 DIABETES MELLITUS WITH DIABETIC NEPHROPATHY, WITH LONG-TERM CURRENT USE OF INSULIN (HCC): ICD-10-CM

## 2021-09-24 LAB
A/G RATIO: 1.5 (ref 1.1–2.2)
ALBUMIN SERPL-MCNC: 4.3 G/DL (ref 3.4–5)
ALP BLD-CCNC: 57 U/L (ref 40–129)
ALT SERPL-CCNC: 36 U/L (ref 10–40)
ANION GAP SERPL CALCULATED.3IONS-SCNC: 15 MMOL/L (ref 3–16)
AST SERPL-CCNC: 33 U/L (ref 15–37)
BILIRUB SERPL-MCNC: 0.4 MG/DL (ref 0–1)
BUN BLDV-MCNC: 30 MG/DL (ref 7–20)
CALCIUM SERPL-MCNC: 10 MG/DL (ref 8.3–10.6)
CHLORIDE BLD-SCNC: 99 MMOL/L (ref 99–110)
CHOLESTEROL, TOTAL: 240 MG/DL (ref 0–199)
CO2: 26 MMOL/L (ref 21–32)
CREAT SERPL-MCNC: 1.3 MG/DL (ref 0.6–1.2)
CREATININE URINE: 61.9 MG/DL (ref 28–259)
GFR AFRICAN AMERICAN: 50
GFR NON-AFRICAN AMERICAN: 41
GLOBULIN: 2.8 G/DL
GLUCOSE BLD-MCNC: 104 MG/DL (ref 70–99)
HDLC SERPL-MCNC: 47 MG/DL (ref 40–60)
LDL CHOLESTEROL CALCULATED: 147 MG/DL
MICROALBUMIN UR-MCNC: 1.4 MG/DL
MICROALBUMIN/CREAT UR-RTO: 22.6 MG/G (ref 0–30)
POTASSIUM SERPL-SCNC: 4.9 MMOL/L (ref 3.5–5.1)
SODIUM BLD-SCNC: 140 MMOL/L (ref 136–145)
T3 FREE: 2.4 PG/ML (ref 2.3–4.2)
T4 FREE: 1.4 NG/DL (ref 0.9–1.8)
TOTAL PROTEIN: 7.1 G/DL (ref 6.4–8.2)
TRIGL SERPL-MCNC: 232 MG/DL (ref 0–150)
TSH SERPL DL<=0.05 MIU/L-ACNC: 7.98 UIU/ML (ref 0.27–4.2)
VLDLC SERPL CALC-MCNC: 46 MG/DL

## 2021-09-24 PROCEDURE — 80061 LIPID PANEL: CPT

## 2021-09-24 PROCEDURE — 82043 UR ALBUMIN QUANTITATIVE: CPT

## 2021-09-24 PROCEDURE — 82570 ASSAY OF URINE CREATININE: CPT

## 2021-09-24 PROCEDURE — 84439 ASSAY OF FREE THYROXINE: CPT

## 2021-09-24 PROCEDURE — 36415 COLL VENOUS BLD VENIPUNCTURE: CPT

## 2021-09-24 PROCEDURE — 84443 ASSAY THYROID STIM HORMONE: CPT

## 2021-09-24 PROCEDURE — 83036 HEMOGLOBIN GLYCOSYLATED A1C: CPT

## 2021-09-24 PROCEDURE — 84481 FREE ASSAY (FT-3): CPT

## 2021-09-24 PROCEDURE — 80053 COMPREHEN METABOLIC PANEL: CPT

## 2021-09-25 LAB
ESTIMATED AVERAGE GLUCOSE: 154.2 MG/DL
HBA1C MFR BLD: 7 %

## 2021-09-29 ENCOUNTER — VIRTUAL VISIT (OUTPATIENT)
Dept: ENDOCRINOLOGY | Age: 66
End: 2021-09-29
Payer: COMMERCIAL

## 2021-09-29 DIAGNOSIS — E55.9 VITAMIN D DEFICIENCY: ICD-10-CM

## 2021-09-29 DIAGNOSIS — E03.9 ACQUIRED HYPOTHYROIDISM: ICD-10-CM

## 2021-09-29 DIAGNOSIS — E11.21 TYPE 2 DIABETES MELLITUS WITH DIABETIC NEPHROPATHY, WITH LONG-TERM CURRENT USE OF INSULIN (HCC): Primary | ICD-10-CM

## 2021-09-29 DIAGNOSIS — Z79.4 TYPE 2 DIABETES MELLITUS WITH DIABETIC NEPHROPATHY, WITH LONG-TERM CURRENT USE OF INSULIN (HCC): Primary | ICD-10-CM

## 2021-09-29 PROCEDURE — 99442 PR PHYS/QHP TELEPHONE EVALUATION 11-20 MIN: CPT | Performed by: NURSE PRACTITIONER

## 2021-09-29 RX ORDER — LEVOTHYROXINE SODIUM 0.07 MG/1
75 TABLET ORAL DAILY
Qty: 30 TABLET | Refills: 5 | Status: SHIPPED | OUTPATIENT
Start: 2021-09-29

## 2021-09-29 ASSESSMENT — ENCOUNTER SYMPTOMS
COLOR CHANGE: 0
CONSTIPATION: 0
SHORTNESS OF BREATH: 0
NAUSEA: 0
DIARRHEA: 0
EYE PAIN: 0

## 2021-09-29 NOTE — PROGRESS NOTES
Endocrinology  [facility], [facility], [address] [facility] [address] [facility], [address]  Phone [phone]  Fax [phone]    Jyotsna Mccrary is  being evaluated by a Virtual Visit (phone visit) encounter to address concerns as mentioned above. Due to this being a TeleHealth encounter (During - public health emergency), evaluation of the following organ systems was limited: Vitals/Constitutional/EENT/Resp/CV/GI//MS/Neuro/Skin/Heme-Lymph-Imm. Pursuant to the emergency declaration under the [address] [place] [address], [address] [place] [address] authority and the [employer] and [employer] Act, this Virtual Visit was conducted with patient's (and/or legal guardian's) consent, to reduce the patient's risk of exposure to COVID-19 and provide necessary medical care. The patient (and/or legal guardian) has also been advised to contact this office for worsening conditions or problems, and seek emergency medical treatment and/or call 911 if deemed necessary. Services were provided through a phone synchronous discussion virtually to substitute for in-person clinic visit. Patient and provider were located at their individual homes    Patient was identified via name,  on the phone visit      Jyotsna Mccrary is a 77 y.o. female who is following up for management of Diabetes Mellitus Type 2.     PCP: Teo Gallo MD    Last A1C:   Lab Results   Component Value Date    LABA1C 7.0 2021    LABA1C 7.1 2021    LABA1C 6.6 2021     Last BP Readings:   BP Readings from Last 3 Encounters:   21 138/62   21 (!) 140/60   21 122/72     Last LDL:   Lab Results   Component Value Date    LDLCALC 147 (H) 2021    LDLDIRECT 167 (H) 2021     Aspirin Use: no    Tobacco History:    Smoking status: Never Smoker    Smokeless tobacco: Never Used    Alcohol use No     Diabetes:  Jyotsna Mccrary  has been diabetic since 1996 and on insulin for 2 years. Patient reports that diabetes is generally not well controlled. Disease course has been stable but uncontrolled. Patient reports compliance for about 80% of the time and adheres to medication, but usually not to diet and exercise instructions. There have been no recent hospital or ED admissions for hypoglycemia, hyperglycemia or DKA. Has had 3 back surgeries:  Last in 2002, chronic back pain, sciatica. DMITRIY in 1996 when she was 41. Was on HRT for 10 years ago  Neck surgery (C5 and C6 ) in 1995    Patient brought no or glucometer at this visit  Readings per day  4 per patient report--> now checking 2 x daily per patient: in morning and after dinner  Average reading 150  Lowest in past 2 weeks 98--> 96 ( usually low 100s)  Highest in past 2 weeks 202--> 176  Hypoglycemia awareness and symptoms: no--> last week and ok after drinking OJ. Did not check BG  CGM done  Usual carbs per meal:does not count carbs    HPI on 6/23/2021  TSH slightly elevated: synthroid 8 pills per week, tolerating well  DEXA osteopenia: on calcium and vitamin D supplementation    Lab Results   Component Value Date    .2 09/24/2021    .1 06/04/2021    .7 05/01/2020     Current Medication regimen:   Steglatro 15 mg QD ( takes 1/2 tab frequently)                                             Tresiba 30 units -- has not titrated--> 24 units--> 20 units--> 30 units-->20 U  Humalog 5-20 U AC TID--> 150-200 : 10 units, 2:50 > 150: typically doses BID with 10 units --> takes QD with dinner only now--> 10 units QD with dinner  Metformin 4223 mg BID  Trulicity 7.21 mg --> 1.5 mg  GFR > 60    Diet:  Typically eat one meal a day: vegetables, meat, rice, soups (cream of potato). --> has improved diet and decreased overall carb intake  Coffee with no sugar, lots of water, lite beer usually 1/day or only on weekends  Reports financial constraints that prevent making better food choices    Microvascular Complications:  · Neuropathy denies concerns  · Retinopathy no concerns with vision, field of vision  · Nephropathy    Diabetic Health Maintenance   · Last Eye Exam: 11/2018--> next visit in 01/2021  · Last Foot exam: with PCP  · Has patient seen a dietitian? Yes  · Current Exercise: No structured exercise  · On ACEI or ARB: no; on norvasc and coreg    Risk Factors  · Smoker: no  · ETOH:neglible  · Co-morbid conditions: HTN, HLD, Obesity, CKD    Hypothyroidism  Stopped levothryoxine 50 mcg per self: is doing better with synthroid brand at same dose  States it made her fall, however she has myotonia and has imbalance issues prior to this  Discussed latest results  Patient denies denies fatigue, weight changes, heat/cold intolerance, bowel/skin changes or CVS symptoms. Lab Results   Component Value Date    TSH 7.98 09/24/2021    TSH 5.72 06/04/2021    TSH 6.46 01/13/2021    FT3 2.4 09/24/2021    FT3 3.1 05/01/2020    FT3 2.6 07/03/2019    T4FREE 1.4 09/24/2021    T4FREE 1.4 06/04/2021    T4FREE 1.5 01/13/2021       Hyperlipidemia: Currently is on Atorvastatin 40 mg. Current complaints include occasional myalgias but otherwise tolerates well. Lab Results   Component Value Date    CHOL 240 09/24/2021    CHOL 242 06/04/2021    CHOL 224 01/13/2021     Lab Results   Component Value Date    TRIG 232 09/24/2021    TRIG 304 06/04/2021    TRIG 240 01/13/2021     Lab Results   Component Value Date    HDL 47 09/24/2021    HDL 45 06/04/2021    HDL 50 01/13/2021    HDL 47 03/20/2012    HDL 59 09/20/2011    HDL 49 12/17/2010     Lab Results   Component Value Date    LDLCALC 147 09/24/2021    LDLCALC see below 06/04/2021    [address] 01/13/2021     Lab Results   Component Value Date    LDLDIRECT 167 06/04/2021    LDLDIRECT 87 04/03/2019    LDLDIRECT 66 10/02/2018     No results found for: CHOLHDLRATIO    Vitamin D deficiency: Currently is on no supplementation.  Current complaints include fatigue on daily basis. Last vitamin D level is:  Lab Results   Component Value Date    VITD25 40.6 01/13/2021    VITD25 33.3 05/01/2020    VITD25 23.3 10/18/2019       Hypertension  Currently on. Norvasc 5 mg. Coreg 25 mg, HCTZ 25 mg BOP elevated at visit, denies symptoms of dizziness, light headedness. Occasional dependent edema. Tries to follow a salt restricted diet. Lab Results   Component Value Date     09/24/2021    K 4.9 09/24/2021    CL 99 09/24/2021    CO2 26 09/24/2021    BUN 30 (H) 09/24/2021    CREATININE 1.3 (H) 09/24/2021    GLUCOSE 104 (H) 09/24/2021    CALCIUM 10.0 09/24/2021    PROT 7.1 09/24/2021    LABALBU 4.3 09/24/2021    BILITOT 0.4 09/24/2021    ALKPHOS 57 09/24/2021    AST 33 09/24/2021    ALT 36 09/24/2021    LABGLOM 41 (A) 09/24/2021    GFRAA 50 (A) 09/24/2021    AGRATIO 1.5 09/24/2021    GLOB 2.8 09/24/2021       The 10-year ASCVD risk score (Uriel Berman, et al., 2013) is: 20.3%    Values used to calculate the score:      Age: 77 years      Sex: Female      Is Non- : No      Diabetic: Yes      Tobacco smoker: No      Systolic Blood Pressure: 640 mmHg      Is BP treated: Yes      HDL Cholesterol: 47 mg/dL      Total Cholesterol: 240 mg/dL    Past Medical History:   Diagnosis Date    Asthma     Chronic kidney disease     Hyperlipidemia     Hypertension     Myotonia     Type II or unspecified type diabetes mellitus without mention of complication, not stated as uncontrolled      Family History   Problem Relation Age of Onset    Diabetes Father      Review of Systems   Constitutional: Negative for activity change, appetite change, diaphoresis, fever and unexpected weight change. HENT: Negative for dental problem. Eyes: Negative for pain and visual disturbance. Respiratory: Negative for shortness of breath. Cardiovascular: Negative for chest pain, palpitations and leg swelling. Gastrointestinal: Negative for constipation, diarrhea and nausea. to FBG 90 -130  Use humalog only PRN  Avoid hypoglycemia    Diet :   30-40 grams per meal , 3 meals per day, avoid carbs in snack choices  Include moderate proteins and good fats   Ensure adequate hydration and  electrolyte replacement    Exercise :  Recommended exercise is 5-7 days a week for 30-60 mins at least, per day OR a total of 2.5 hours per week , which ever is more feasible. Ambulate as possible     Diabetic Health Maintenance  Follow up with annual eye exams  Follow up with annual podiatry exams if needed  Reviewed sick day management of BG     Other areas of Diabetic Education reviewed:   Carbs: good carbs and bad carbs, importance of carb counting, incorporation of protein with each meal to reduce Glycemic index, importance of portions, Carb/insulin ratio   Fats: Good fats and bad fats, meal planning and supplements.  Discussed how food affects blood sugar readings.  Different diabetic medications   Managing high and low sugar readings   Rotation of sites for subcutaneous medication injection    Mixed Hyperlipidemia  Lab Results   Component Value Date    LDLDIRECT 167 06/04/2021    LDLDIRECT 87 04/03/2019    LDLDIRECT 66 10/02/2018     Lab Results   Component Value Date    TRIG 232 09/24/2021    TRIG 304 06/04/2021    TRIG 240 01/13/2021     LDL goal  < 100;   TG elevated a@ 232; improved from previous  Continue on Fenofibrate at 48 mg QD  Denies excess carb intake via food or beverages. Negligible alcohol intake  Managed by PCP    Hypothyroidism  Reviewed labs : TSH further elevated  Increase Synthroid to 75 mcg  Repeat TFT in 3 months    Essential Hypertension  Blood pressure controlled.  Continue current regimen    Vitamin D deficiency  Lab Results   Component Value Date    VITD25 40.6 01/13/2021     Continue to supplement  Recheck level in 3 months    Problem List Items Addressed This Visit     Acquired hypothyroidism    Relevant Medications    levothyroxine (SYNTHROID) 75 MCG tablet Other Relevant Orders    T4, Free    TSH without Reflex    Type 2 diabetes mellitus (HCC) - Primary    Relevant Orders    Hemoglobin A1C    Comprehensive Metabolic Panel    Lipid Panel    Vitamin D deficiency    Relevant Orders    Vitamin D 25 Hydroxy         Return in about 3 months (around 12/29/2021).

## 2021-10-19 ENCOUNTER — HOSPITAL ENCOUNTER (OUTPATIENT)
Dept: WOMENS IMAGING | Age: 66
Discharge: HOME | End: 2021-10-19
Payer: COMMERCIAL

## 2021-10-19 DIAGNOSIS — Z12.31 ENCOUNTER FOR SCREENING MAMMOGRAM FOR MALIGNANT NEOPLASM OF BREAST: ICD-10-CM

## 2021-10-19 PROCEDURE — 77067 SCR MAMMO BI INCL CAD: CPT

## 2021-10-21 DIAGNOSIS — E78.2 MIXED HYPERLIPIDEMIA: ICD-10-CM

## 2021-10-21 RX ORDER — AMLODIPINE BESYLATE 5 MG/1
5 TABLET ORAL DAILY
Qty: 90 TABLET | Refills: 1 | Status: SHIPPED | OUTPATIENT
Start: 2021-10-21 | End: 2022-04-19

## 2021-10-21 RX ORDER — CARVEDILOL 25 MG/1
TABLET ORAL
Qty: 180 TABLET | Refills: 1 | Status: SHIPPED | OUTPATIENT
Start: 2021-10-21 | End: 2022-04-19

## 2021-10-21 NOTE — TELEPHONE ENCOUNTER
Medication:   Requested Prescriptions     Pending Prescriptions Disp Refills    fenofibrate (TRICOR) 48 MG tablet [Pharmacy Med Name: FENOFIBRATE 48MG TABLETS] 30 tablet 3     Sig: TAKE 1 TABLET BY MOUTH DAILY         Last appt: 9/29/2021   Next appt: Visit date not found    Last Lipid:   Lab Results   Component Value Date    CHOL 240 09/24/2021    TRIG 232 09/24/2021    HDL 47 09/24/2021    HDL 47 03/20/2012    LDLCALC 147 09/24/2021

## 2021-10-26 RX ORDER — FENOFIBRATE 48 MG/1
48 TABLET, COATED ORAL DAILY
Qty: 30 TABLET | Refills: 3 | Status: SHIPPED | OUTPATIENT
Start: 2021-10-26 | End: 2022-02-21

## 2021-12-03 ENCOUNTER — HOSPITAL ENCOUNTER (OUTPATIENT)
Age: 66
Discharge: HOME | End: 2021-12-03
Payer: COMMERCIAL

## 2021-12-03 DIAGNOSIS — E11.21 TYPE 2 DIABETES MELLITUS WITH DIABETIC NEPHROPATHY, WITH LONG-TERM CURRENT USE OF INSULIN (HCC): ICD-10-CM

## 2021-12-03 DIAGNOSIS — E55.9 VITAMIN D DEFICIENCY: ICD-10-CM

## 2021-12-03 DIAGNOSIS — E03.9 ACQUIRED HYPOTHYROIDISM: ICD-10-CM

## 2021-12-03 DIAGNOSIS — Z79.4 TYPE 2 DIABETES MELLITUS WITH DIABETIC NEPHROPATHY, WITH LONG-TERM CURRENT USE OF INSULIN (HCC): ICD-10-CM

## 2021-12-03 LAB
A/G RATIO: 1.5 (ref 1.1–2.2)
ALBUMIN SERPL-MCNC: 4.3 G/DL (ref 3.4–5)
ALP BLD-CCNC: 60 U/L (ref 40–129)
ALT SERPL-CCNC: 43 U/L (ref 10–40)
ANION GAP SERPL CALCULATED.3IONS-SCNC: 14 MMOL/L (ref 3–16)
AST SERPL-CCNC: 44 U/L (ref 15–37)
BILIRUB SERPL-MCNC: <0.2 MG/DL (ref 0–1)
BUN BLDV-MCNC: 33 MG/DL (ref 7–20)
CALCIUM SERPL-MCNC: 9.9 MG/DL (ref 8.3–10.6)
CHLORIDE BLD-SCNC: 100 MMOL/L (ref 99–110)
CHOLESTEROL, TOTAL: 214 MG/DL (ref 0–199)
CO2: 27 MMOL/L (ref 21–32)
CREAT SERPL-MCNC: 1.6 MG/DL (ref 0.6–1.2)
GFR AFRICAN AMERICAN: 39
GFR NON-AFRICAN AMERICAN: 32
GLUCOSE BLD-MCNC: 181 MG/DL (ref 70–99)
HDLC SERPL-MCNC: 47 MG/DL (ref 40–60)
LDL CHOLESTEROL CALCULATED: 115 MG/DL
POTASSIUM SERPL-SCNC: 4.8 MMOL/L (ref 3.5–5.1)
SODIUM BLD-SCNC: 141 MMOL/L (ref 136–145)
T4 FREE: 1.4 NG/DL (ref 0.9–1.8)
TOTAL PROTEIN: 7.1 G/DL (ref 6.4–8.2)
TRIGL SERPL-MCNC: 262 MG/DL (ref 0–150)
TSH SERPL DL<=0.05 MIU/L-ACNC: 5.44 UIU/ML (ref 0.27–4.2)
VITAMIN D 25-HYDROXY: 41.3 NG/ML
VLDLC SERPL CALC-MCNC: 52 MG/DL

## 2021-12-03 PROCEDURE — 84443 ASSAY THYROID STIM HORMONE: CPT

## 2021-12-03 PROCEDURE — 82306 VITAMIN D 25 HYDROXY: CPT

## 2021-12-03 PROCEDURE — 84439 ASSAY OF FREE THYROXINE: CPT

## 2021-12-03 PROCEDURE — 80061 LIPID PANEL: CPT

## 2021-12-03 PROCEDURE — 80053 COMPREHEN METABOLIC PANEL: CPT

## 2021-12-03 PROCEDURE — 36415 COLL VENOUS BLD VENIPUNCTURE: CPT

## 2021-12-03 PROCEDURE — 83036 HEMOGLOBIN GLYCOSYLATED A1C: CPT

## 2021-12-04 LAB
ESTIMATED AVERAGE GLUCOSE: 174.3 MG/DL
HBA1C MFR BLD: 7.7 %

## 2021-12-08 ENCOUNTER — VIRTUAL VISIT (OUTPATIENT)
Dept: ENDOCRINOLOGY | Age: 66
End: 2021-12-08
Payer: COMMERCIAL

## 2021-12-08 DIAGNOSIS — E03.9 ACQUIRED HYPOTHYROIDISM: ICD-10-CM

## 2021-12-08 DIAGNOSIS — I10 PRIMARY HYPERTENSION: ICD-10-CM

## 2021-12-08 DIAGNOSIS — Z79.4 TYPE 2 DIABETES MELLITUS WITH DIABETIC NEPHROPATHY, WITH LONG-TERM CURRENT USE OF INSULIN (HCC): Primary | ICD-10-CM

## 2021-12-08 DIAGNOSIS — E78.2 MIXED HYPERLIPIDEMIA: ICD-10-CM

## 2021-12-08 DIAGNOSIS — E11.21 TYPE 2 DIABETES MELLITUS WITH DIABETIC NEPHROPATHY, WITH LONG-TERM CURRENT USE OF INSULIN (HCC): Primary | ICD-10-CM

## 2021-12-08 PROCEDURE — 99442 PR PHYS/QHP TELEPHONE EVALUATION 11-20 MIN: CPT | Performed by: NURSE PRACTITIONER

## 2021-12-08 RX ORDER — DULAGLUTIDE 1.5 MG/.5ML
1.5 INJECTION, SOLUTION SUBCUTANEOUS WEEKLY
Qty: 2 ML | Refills: 3 | Status: SHIPPED | OUTPATIENT
Start: 2021-12-08 | End: 2022-01-03

## 2021-12-08 ASSESSMENT — ENCOUNTER SYMPTOMS
NAUSEA: 0
EYE PAIN: 0
DIARRHEA: 0
SHORTNESS OF BREATH: 0
COLOR CHANGE: 0
CONSTIPATION: 0

## 2021-12-08 NOTE — PROGRESS NOTES
Endocrinology  Lori Saul, [facility], [address] [facility], [address]  Phone [phone]  Fax [phone]    Julio César Simon is  being evaluated by a Virtual Visit (phone visit) encounter to address concerns as mentioned above. Due to this being a TeleHealth encounter (During RJWZX-59 public health emergency), evaluation of the following organ systems was limited: Vitals/Constitutional/EENT/Resp/CV/GI//MS/Neuro/Skin/Heme-Lymph-Imm. Pursuant to the emergency declaration under the [facility], [address] [place] [address] authority and the [employer] Act, this Virtual Visit was conducted with patient's (and/or legal guardian's) consent, to reduce the patient's risk of exposure to COVID-19 and provide necessary medical care. The patient (and/or legal guardian) has also been advised to contact this office for worsening conditions or problems, and seek emergency medical treatment and/or call 911 if deemed necessary. Services were provided through a phone synchronous discussion virtually to substitute for in-person clinic visit. Patient and provider were located at their individual homes    Patient was identified via name,  on the phone visit      Julio César Simon is a 77 y.o. female who is following up for management of Diabetes Mellitus Type 2.     PCP: Willis Tillman MD    Last A1C:   Lab Results   Component Value Date    LABA1C 7.7 2021    LABA1C 7.0 2021    LABA1C 7.1 2021     Last BP Readings:   BP Readings from Last 3 Encounters:   21 138/62   21 (!) 140/60   21 122/72     Last LDL:   Lab Results   Component Value Date    LDLCALC 115 (H) 2021    LDLDIRECT 167 (H) 2021     Aspirin Use: no    Tobacco History:    Smoking status: Never Smoker    Smokeless tobacco: Never Used    Alcohol use No     Diabetes:  Julio César Simon  has been diabetic since 1996 and on insulin for 2 years. Patient reports that diabetes is generally not well controlled. Disease course has been stable but uncontrolled. Patient reports compliance for about 80% of the time and adheres to medication, but usually not to diet and exercise instructions. There have been no recent hospital or ED admissions for hypoglycemia, hyperglycemia or DKA. Has had 3 back surgeries:  Last in 2002, chronic back pain, sciatica. DMITRIY in 1996 when she was 41. Was on HRT for 10 years ago  Neck surgery (C5 and C6 ) in 1995    Readings per day  4 per patient report--> now checking 2 x daily per patient: in morning and after dinner  Average reading 150  Lowest in past 2 weeks 98--> 96 ( usually low 100s)  Highest in past 2 weeks 202--> 176  Hypoglycemia awareness and symptoms: no--> last week and ok after drinking OJ. Did not check BG  CGM done  Usual carbs per meal:does not count carbs    HPI on 12/78/2021  TSH slightly elevated: synthroid 8 pills per week, tolerating well  DEXA osteopenia: on calcium and vitamin D supplementation    Lab Results   Component Value Date    .3 12/03/2021    .2 09/24/2021    .1 06/04/2021     Current Medication regimen:   Steglatro 15 mg QD ( takes 1/2 tab frequently)                                             Tresiba 30 units -- has not titrated--> 24 units--> 20 units--> 30 units-->20 U  Humalog 5-20 U AC TID--> 150-200 : 10 units, 2:50 > 150: typically doses BID with 10 units --> takes QD with dinner only now--> 10 units QD with dinner  Metformin 4930 mg BID  Trulicity 8.61 mg --> 1.5 mg  GFR > 60    Diet:  Typically eat one meal a day: vegetables, meat, rice, soups (cream of potato). --> has improved diet and decreased overall carb intake  Coffee with no sugar, lots of water, lite beer usually 1/day or only on weekends  Reports financial constraints that prevent making better food choices    Microvascular Complications:  · Neuropathy denies concerns  · Retinopathy no concerns with vision, field of vision  · Nephropathy    Diabetic Health Maintenance   · Last Eye Exam: 11/2018--> next visit in 01/2021  · Last Foot exam: with PCP  · Has patient seen a dietitian? Yes  · Current Exercise: No structured exercise  · On ACEI or ARB: no; on norvasc and coreg    Risk Factors  · Smoker: no  · ETOH:neglible  · Co-morbid conditions: HTN, HLD, Obesity, CKD    Hypothyroidism  Stopped levothryoxine 50 mcg per self: is doing better with synthroid brand at same dose  States it made her fall, however she has myotonia and has imbalance issues prior to this  Discussed latest results  Patient denies denies fatigue, weight changes, heat/cold intolerance, bowel/skin changes or CVS symptoms. Lab Results   Component Value Date    TSH 5.44 12/03/2021    TSH 7.98 09/24/2021    TSH 5.72 06/04/2021    FT3 2.4 09/24/2021    FT3 3.1 05/01/2020    FT3 2.6 07/03/2019    T4FREE 1.4 12/03/2021    T4FREE 1.4 09/24/2021    T4FREE 1.4 06/04/2021       Hyperlipidemia: Currently is on Atorvastatin 40 mg. Current complaints include occasional myalgias but otherwise tolerates well. Lab Results   Component Value Date    CHOL 214 12/03/2021    CHOL 240 09/24/2021    CHOL 242 06/04/2021     Lab Results   Component Value Date    TRIG 262 12/03/2021    TRIG 232 09/24/2021    TRIG 304 06/04/2021     Lab Results   Component Value Date    HDL 47 12/03/2021    HDL 47 09/24/2021    HDL 45 06/04/2021    HDL 47 03/20/2012    HDL 59 09/20/2011    HDL 49 12/17/2010     Lab Results   Component Value Date    LDLCALC 115 12/03/2021    LDLCALC 147 09/24/2021    LDLCALC see below 06/04/2021     Lab Results   Component Value Date    LDLDIRECT 167 06/04/2021    LDLDIRECT 87 04/03/2019    LDLDIRECT 66 10/02/2018     No results found for: CHOLHDLRATIO    Vitamin D deficiency: Currently is on no supplementation. Current complaints include fatigue on daily basis.  Last vitamin D level is:  Lab Results   Component Value Date    VITD25 41.3 12/03/2021    VITD25 40.6 01/13/2021    VITD25 33.3 05/01/2020       Hypertension  Currently on. Norvasc 5 mg. Coreg 25 mg, HCTZ 25 mg BOP elevated at visit, denies symptoms of dizziness, light headedness. Occasional dependent edema. Tries to follow a salt restricted diet. Lab Results   Component Value Date     12/03/2021    K 4.8 12/03/2021     12/03/2021    CO2 27 12/03/2021    BUN 33 (H) 12/03/2021    CREATININE 1.6 (H) 12/03/2021    GLUCOSE 181 (H) 12/03/2021    CALCIUM 9.9 12/03/2021    PROT 7.1 12/03/2021    LABALBU 4.3 12/03/2021    BILITOT <0.2 12/03/2021    ALKPHOS 60 12/03/2021    AST 44 (H) 12/03/2021    ALT 43 (H) 12/03/2021    LABGLOM 32 (A) 12/03/2021    GFRAA 39 (A) 12/03/2021    AGRATIO 1.5 12/03/2021    GLOB 2.8 09/24/2021       The 10-year ASCVD risk score (Cholo Galvan, et al., 2013) is: 19.3%    Values used to calculate the score:      Age: 77 years      Sex: Female      Is Non- : No      Diabetic: Yes      Tobacco smoker: No      Systolic Blood Pressure: 549 mmHg      Is BP treated: Yes      HDL Cholesterol: 47 mg/dL      Total Cholesterol: 214 mg/dL    Past Medical History:   Diagnosis Date    Asthma     Chronic kidney disease     Hyperlipidemia     Hypertension     Myotonia     Type II or unspecified type diabetes mellitus without mention of complication, not stated as uncontrolled      Family History   Problem Relation Age of Onset    Diabetes Father      Review of Systems   Constitutional: Negative for activity change, appetite change, diaphoresis, fever and unexpected weight change. HENT: Negative for dental problem. Eyes: Negative for pain and visual disturbance. Respiratory: Negative for shortness of breath. Cardiovascular: Negative for chest pain, palpitations and leg swelling. Gastrointestinal: Negative for constipation, diarrhea and nausea.    Endocrine: Negative for cold intolerance, heat intolerance, replacement    Exercise :  Recommended exercise is 5-7 days a week for 30-60 mins at least, per day OR a total of 2.5 hours per week , which ever is more feasible. Ambulate as possible     Diabetic Health Maintenance  Follow up with annual eye exams  Follow up with annual podiatry exams if needed  Reviewed sick day management of BG     Other areas of Diabetic Education reviewed:   Carbs: good carbs and bad carbs, importance of carb counting, incorporation of protein with each meal to reduce Glycemic index, importance of portions, Carb/insulin ratio   Fats: Good fats and bad fats, meal planning and supplements.  Discussed how food affects blood sugar readings.  Different diabetic medications   Managing high and low sugar readings   Rotation of sites for subcutaneous medication injection    Mixed Hyperlipidemia  Lab Results   Component Value Date    LDLDIRECT 167 06/04/2021    LDLDIRECT 87 04/03/2019    LDLDIRECT 66 10/02/2018     Lab Results   Component Value Date    TRIG 262 12/03/2021    TRIG 232 09/24/2021    TRIG 304 06/04/2021     LDL goal  < 100;   TG elevated a@ 232; improved from previous  Continue on Fenofibrate at 48 mg QD  Denies excess carb intake via food or beverages. Negligible alcohol intake  Managed by PCP    Hypothyroidism  Reviewed labs : TSH further elevated  Increase Synthroid to 75 mcg and take one additional pill per week  Repeat TFT in 3 months    Essential Hypertension  Blood pressure controlled.  Continue current regimen    Vitamin D deficiency  Lab Results   Component Value Date    VITD25 41.3 12/03/2021     Continue to supplement  Recheck level in 3 months    Problem List Items Addressed This Visit     Acquired hypothyroidism    Relevant Orders    T4, Free    TSH without Reflex    HLD (hyperlipidemia)    Relevant Orders    Lipid Panel    HTN (hypertension)    Relevant Orders    Comprehensive Metabolic Panel    Type 2 diabetes mellitus ([facility] [address] - Primary    Relevant Medications dapagliflozin (FARXIGA) 5 MG tablet    Dulaglutide (TRULICITY) 1.5 QP/4.3YQ SOPN    Other Relevant Orders    Hemoglobin A1C    Lipid Panel    Comprehensive Metabolic Panel         Return in about 3 months (around 3/8/2022).

## 2021-12-09 ENCOUNTER — TELEPHONE (OUTPATIENT)
Dept: ENDOCRINOLOGY | Age: 66
End: 2021-12-09

## 2021-12-09 NOTE — TELEPHONE ENCOUNTER
Steglatro is no longer being covered by insurance. It is being taken off the drug list and is moving to a higher tier non preferred brand. Alternatives are Metformin, [place] and Jardiance. Which would you like to order?

## 2021-12-10 NOTE — TELEPHONE ENCOUNTER
Submitted PA for Trulicity 4.3GU/5.4AB pen-injectors Key: [id number] - PA Case ID: [id number]   Via CMM STATUS: :PA not Required. The Prescribed limit is below the plan limit;

## 2021-12-13 ENCOUNTER — TELEPHONE (OUTPATIENT)
Dept: ENDOCRINOLOGY | Age: 66
End: 2021-12-13

## 2021-12-13 NOTE — TELEPHONE ENCOUNTER
Submitted PA for Trulicity 9.6CJ/0.7SY pen-injectors  Key: [id number] - PA Case ID: [id number] - Rx #: [id number]   Via [facility] STATUS: Approved Start Date:12/13/2021; Coverage End Date:12/14/2022;

## 2022-01-02 DIAGNOSIS — E11.21 TYPE 2 DIABETES MELLITUS WITH DIABETIC NEPHROPATHY, WITH LONG-TERM CURRENT USE OF INSULIN (HCC): ICD-10-CM

## 2022-01-02 DIAGNOSIS — Z79.4 TYPE 2 DIABETES MELLITUS WITH DIABETIC NEPHROPATHY, WITH LONG-TERM CURRENT USE OF INSULIN (HCC): ICD-10-CM

## 2022-01-03 RX ORDER — DULAGLUTIDE 1.5 MG/.5ML
INJECTION, SOLUTION SUBCUTANEOUS
Qty: 2 ML | Refills: 3 | Status: SHIPPED | OUTPATIENT
Start: 2022-01-03

## 2022-01-03 NOTE — TELEPHONE ENCOUNTER
LOV- 12/2021        Requested Prescriptions     Pending Prescriptions Disp Refills    TRULICITY 1.5 MU/8.8UV SOPN [Pharmacy Med Name: TRULICITY 5.2ZY/5.7WZ SDP 0.5ML] 2 mL 3     Sig: ADMINISTER 1.5 MG UNDER THE SKIN 1 TIME A WEEK

## 2022-01-19 NOTE — TELEPHONE ENCOUNTER
Marcelle Lucia [phone] (home) [phone] (work)   is requesting refill(s) of medication Metformin to preferred pharmacy [facility] 8/4/21 (pertaining to medication)   Last refill 7/26/21 (per medication requested)  Next office visit scheduled or attempted Yes  Date 2/9/22  If No, reason

## 2022-02-09 ENCOUNTER — OFFICE VISIT (OUTPATIENT)
Dept: FAMILY MEDICINE CLINIC | Age: 67
End: 2022-02-09
Payer: COMMERCIAL

## 2022-02-09 ENCOUNTER — TELEPHONE (OUTPATIENT)
Dept: FAMILY MEDICINE CLINIC | Age: 67
End: 2022-02-09

## 2022-02-09 VITALS
DIASTOLIC BLOOD PRESSURE: 62 MMHG | SYSTOLIC BLOOD PRESSURE: 134 MMHG | OXYGEN SATURATION: 97 % | BODY MASS INDEX: 34.19 KG/M2 | HEIGHT: 62 IN | HEART RATE: 77 BPM | RESPIRATION RATE: 16 BRPM | WEIGHT: 185.8 LBS | TEMPERATURE: 97.3 F

## 2022-02-09 DIAGNOSIS — I10 ESSENTIAL HYPERTENSION: Primary | ICD-10-CM

## 2022-02-09 DIAGNOSIS — Z12.11 SCREEN FOR COLON CANCER: ICD-10-CM

## 2022-02-09 DIAGNOSIS — N18.32 STAGE 3B CHRONIC KIDNEY DISEASE (HCC): ICD-10-CM

## 2022-02-09 DIAGNOSIS — E78.2 MIXED HYPERLIPIDEMIA: ICD-10-CM

## 2022-02-09 DIAGNOSIS — Z79.4 TYPE 2 DIABETES MELLITUS WITH DIABETIC NEPHROPATHY, WITH LONG-TERM CURRENT USE OF INSULIN (HCC): ICD-10-CM

## 2022-02-09 DIAGNOSIS — E11.21 TYPE 2 DIABETES MELLITUS WITH DIABETIC NEPHROPATHY, WITH LONG-TERM CURRENT USE OF INSULIN (HCC): ICD-10-CM

## 2022-02-09 PROCEDURE — 99214 OFFICE O/P EST MOD 30 MIN: CPT | Performed by: FAMILY MEDICINE

## 2022-02-09 RX ORDER — PEN NEEDLE, DIABETIC 31 GX5/16"
1 NEEDLE, DISPOSABLE MISCELLANEOUS 4 TIMES DAILY
Qty: 150 EACH | Refills: 5 | Status: SHIPPED | OUTPATIENT
Start: 2022-02-09

## 2022-02-09 ASSESSMENT — PATIENT HEALTH QUESTIONNAIRE - PHQ9
SUM OF ALL RESPONSES TO PHQ9 QUESTIONS 1 & 2: 0
6. FEELING BAD ABOUT YOURSELF - OR THAT YOU ARE A FAILURE OR HAVE LET YOURSELF OR YOUR FAMILY DOWN: 0
3. TROUBLE FALLING OR STAYING ASLEEP: 0
2. FEELING DOWN, DEPRESSED OR HOPELESS: 0
SUM OF ALL RESPONSES TO PHQ QUESTIONS 1-9: 0
SUM OF ALL RESPONSES TO PHQ QUESTIONS 1-9: 0
1. LITTLE INTEREST OR PLEASURE IN DOING THINGS: 0
9. THOUGHTS THAT YOU WOULD BE BETTER OFF DEAD, OR OF HURTING YOURSELF: 0
SUM OF ALL RESPONSES TO PHQ QUESTIONS 1-9: 0
SUM OF ALL RESPONSES TO PHQ QUESTIONS 1-9: 0
7. TROUBLE CONCENTRATING ON THINGS, SUCH AS READING THE NEWSPAPER OR WATCHING TELEVISION: 0
5. POOR APPETITE OR OVEREATING: 0
10. IF YOU CHECKED OFF ANY PROBLEMS, HOW DIFFICULT HAVE THESE PROBLEMS MADE IT FOR YOU TO DO YOUR WORK, TAKE CARE OF THINGS AT HOME, OR GET ALONG WITH OTHER PEOPLE: 0
8. MOVING OR SPEAKING SO SLOWLY THAT OTHER PEOPLE COULD HAVE NOTICED. OR THE OPPOSITE, BEING SO FIGETY OR RESTLESS THAT YOU HAVE BEEN MOVING AROUND A LOT MORE THAN USUAL: 0
4. FEELING TIRED OR HAVING LITTLE ENERGY: 0

## 2022-02-09 ASSESSMENT — COLUMBIA-SUICIDE SEVERITY RATING SCALE - C-SSRS
6. HAVE YOU EVER DONE ANYTHING, STARTED TO DO ANYTHING, OR PREPARED TO DO ANYTHING TO END YOUR LIFE?: NO
2. HAVE YOU ACTUALLY HAD ANY THOUGHTS OF KILLING YOURSELF?: NO
1. WITHIN THE PAST MONTH, HAVE YOU WISHED YOU WERE DEAD OR WISHED YOU COULD GO TO SLEEP AND NOT WAKE UP?: NO

## 2022-02-09 NOTE — TELEPHONE ENCOUNTER
LM for the pt to contact the office. Dr. Lon Viera would like for the pt to see Dr. Tosha Christensen. I have already faxed the referral to his office. His phone number to call and be scheduled is [phone].

## 2022-02-09 NOTE — PROGRESS NOTES
Roly Morel presents for   Chief Complaint   Patient presents with    Hypertension     Pt is here for 6 month follow up and is fasting        ASSESSMENT:   Diagnosis Orders   1. Essential hypertension her blood pressure is well controlled on multiple agents continue current regimen    2. Mixed hyperlipidemia, patient had elevated liver function test on statin, Endo has her on TriCor. Lipids are suboptimal    3. Type 2 diabetes mellitus with diabetic nephropathy, with long-term current use of insulin ([facility] [address] last A1c was 7.7 patient's Endo nurse practitioner is leaving practice. I reduce the dose of her metformin to 500 mg twice a day due to her worsening GFR    4. Stage 3b chronic kidney disease ([address] last BUN/creatinine slightly increased BUN 33 creatinine 1.6 GFR 32    5. Screen for colon cancer patient is due Jeancarlos Jones Fecal DNA Colorectal cancer screening (Cologuard)        Plan:  1)  Medication: Metformin dose was decreased to 500 mg twice a day, other medications are working and tolerated, continue as listed  2)  Recheck in 6 months, sooner should new symptoms or problems arise. 3) LLR, reviewed          SUBJECTIVE:  Roly Morel is a 77 y.o. female who presents for evaluation of diabetes with nephropathy, CKD, history of elevated LFTs, history of myotonia hypertension and hyperlipidemia. She indicates that she is feeling well and denies any symptoms referable to her elevated blood pressure. Specifically denies chest pain, palpitations, dyspnea, orthopnea, PND or peripheral edema or neuro sx. No anorexia, arthralgia, or leg cramps noted. Current medication regimen is as listed below. She denies any side effects of medication, and has been taking it regularly. Lab Results   Component Value Date    LDLCALC 115 (H) 12/03/2021    LDLDIRECT 167 (H) 06/04/2021       Overall, patient is feeling well. She had labs done in December 2021. Those labs were reviewed.   Her last A1c was 7.7, she states  Magnesium 400 MG TABS Take by mouth nightly      cetirizine (ZYRTEC) 10 MG tablet Take 10 mg by mouth daily      CINNAMON PO Take by mouth       No current facility-administered medications for this visit. Allergies   Allergen Reactions    Lisinopril     Norvasc [Amlodipine Besylate] Other (See Comments)     edema    Proventil [Albuterol Sulfate] Other (See Comments)     Paralyzed vocal cords    Tetanus Toxoids        Social History     Tobacco Use    Smoking status: Never Smoker    Smokeless tobacco: Never Used   Substance Use Topics    Alcohol use: No       OBJECTIVE:   /62 (Site: Left Upper Arm, Position: Sitting)   Pulse 77   Temp 97.3 °F (36.3 °C) (Temporal)   Resp 16   Ht 5' 2\" (1.575 m)   Wt 185 lb 12.8 oz (84.3 kg)   SpO2 97%   BMI 33.98 kg/m²   NAD  Neck no bruit or JVD  S1 and S2 normal, no murmurs, clicks, gallops or rubs. Regular rate and rhythm. Chest is clear; no wheezes or rales. No edema or JVD. Neuro alert, no CN or motor deficits  Psych nl mood, thought and judgement      After further investigation, Kayla does not have an endocrinologist available to continue care for patient. She was therefore referred to the [facility] endocrinology group, Dr. Stephenie Handley/transcription disclaimer:  Much of this encounter note is electronic transcription/translation of spoken language to printed texts. The electronic translation of spoken language may be erroneous, or at times, nonsensical words or phrases may be inadvertently transcribed.   Although I have reviewed the note for such errors, some may still exist.

## 2022-02-18 DIAGNOSIS — I10 ESSENTIAL HYPERTENSION: ICD-10-CM

## 2022-02-18 DIAGNOSIS — E78.2 MIXED HYPERLIPIDEMIA: ICD-10-CM

## 2022-02-18 RX ORDER — HYDROCHLOROTHIAZIDE 25 MG/1
25 TABLET ORAL DAILY
Qty: 90 TABLET | Refills: 1 | Status: SHIPPED | OUTPATIENT
Start: 2022-02-18 | End: 2022-08-24

## 2022-02-18 NOTE — TELEPHONE ENCOUNTER
Amy Lloyd is requesting refill(s)   Last OV 02/09/2022 (pertaining to medication)  LR 08/23/2021 (per medication requested)  Next office visit scheduled or attempted 08/10/2022

## 2022-02-18 NOTE — TELEPHONE ENCOUNTER
LOV- 12/2021        Requested Prescriptions     Pending Prescriptions Disp Refills    fenofibrate (TRICOR) 48 MG tablet [Pharmacy Med Name: FENOFIBRATE 48MG TABLETS] 30 tablet 3     Sig: TAKE 1 TABLET BY MOUTH DAILY

## 2022-02-21 RX ORDER — FENOFIBRATE 48 MG/1
48 TABLET, COATED ORAL DAILY
Qty: 30 TABLET | Refills: 3 | Status: SHIPPED | OUTPATIENT
Start: 2022-02-21

## 2022-03-01 LAB — NONINV COLON CA DNA+OCC BLD SCRN STL QL: NEGATIVE

## 2022-03-08 LAB — DIABETIC RETINOPATHY: NEGATIVE

## 2022-03-17 ENCOUNTER — TELEPHONE (OUTPATIENT)
Dept: FAMILY MEDICINE CLINIC | Age: 67
End: 2022-03-17

## 2022-03-17 NOTE — TELEPHONE ENCOUNTER
----- Message from Manuel Michel sent at 3/17/2022 11:46 AM EDT -----  Subject: Referral Request    QUESTIONS   Reason for referral request? pt rashard was given a referral for and   Endocrinologist [facility] but his office is saying   they still have not received the order ---was original requested to be fax   to [phone]--UR calling back today with a different fax # [phone]   ---if any question plz call pt on work # B4 5 after 5PM plz call cell   Has the physician seen you for this condition before? No   Preferred Specialist (if applicable)? Do you already have an appointment scheduled? No  Additional Information for Provider?   ---------------------------------------------------------------------------  --------------  CALL BACK INFO  What is the best way for the office to contact you? OK to leave message on   voicemail  Preferred Call Back Phone Number?  [phone]

## 2022-04-19 RX ORDER — AMLODIPINE BESYLATE 5 MG/1
5 TABLET ORAL DAILY
Qty: 30 TABLET | Refills: 0 | Status: SHIPPED | OUTPATIENT
Start: 2022-04-19 | End: 2022-05-19

## 2022-04-19 RX ORDER — CARVEDILOL 25 MG/1
TABLET ORAL
Qty: 60 TABLET | Refills: 0 | Status: SHIPPED | OUTPATIENT
Start: 2022-04-19 | End: 2022-05-19

## 2022-04-19 NOTE — TELEPHONE ENCOUNTER
11/16/2020 [facility] [place]    LMOM for pt to contact the office for an appt.

## 2022-04-29 NOTE — PROGRESS NOTES
[facility]   Cardiac Consultation    Referring Provider:  Solomon Saini MD     Chief Complaint   Patient presents with    1 Year Follow Up     Last OV 11/2022    Hyperlipidemia    Hypertension        History of Present Illness:   Cruz Sweet is a 77 y.o. female who is here for follow up of  hypertension, hyperlipidemia. Renal artery US normal in 2017. An echocardiogram from 2017 showed an EF of 55%. Today she states she is doing well. She still does yard work without limitations. She checks her blood pressure at home about once a week. She states it runs 120's/60-70's. She denies seeing her blood pressure over 119 systolic. She denies smoking. She states her  used to smoke around her. Her grandfather passed of a heart attack in his 63's. Patient is taking all cardiac medications as prescribed and tolerates them well. Patient denies current edema, chest pain, sob, palpitations, dizziness or syncope. Past Medical History:   has a past medical history of Asthma, Chronic kidney disease, Hyperlipidemia, Hypertension, Myotonia, and Type II or unspecified type diabetes mellitus without mention of complication, not stated as uncontrolled. Surgical History:   has a past surgical history that includes Spine surgery (1995); Hysterectomy; US BIOPSY LIVER PERCUTANEOUS (8/14/2020); and Ovary removal.     Social History:   reports that she has never smoked. She has never used smokeless tobacco. She reports current alcohol use. She reports that she does not use drugs. Family History:  family history includes Diabetes in her father. Home Medications:  Prior to Admission medications    Medication Sig Start Date End Date Taking?  Authorizing Provider   amLODIPine (NORVASC) 5 MG tablet TAKE 1 TABLET BY MOUTH DAILY 4/19/22  Yes Retia Paget, MD   carvedilol (COREG) 25 MG tablet TAKE 1 TABLET BY MOUTH TWICE DAILY 4/19/22  Yes Retia Paget, MD   fenofibrate (TRICOR) 48 MG tablet TAKE 1 TABLET BY MOUTH DAILY 2/21/22  Yes SHIRA Gabriel CNP   hydroCHLOROthiazide (HYDRODIURIL) 25 MG tablet TAKE 1 TABLET BY MOUTH DAILY 2/18/22  Yes Jeremias Knox MD   Insulin Pen Needle (PEN NEEDLES 31GX5/16\") 31G X 8 MM MISC 1 each by Does not apply route 4 times daily 2/9/22  Yes Jeremias Knox MD   metFORMIN (GLUCOPHAGE) 1000 MG tablet TAKE 1 TABLET BY MOUTH TWICE DAILY WITH MEALS  Patient taking differently: Take 500 mg by mouth 1/2 tablet in AM, 1/2 tablet in PM 1/19/22  Yes Jeremias Knox MD   TRULICITY 1.5 DT/7.7HM SOPN ADMINISTER 1.5 MG UNDER THE SKIN 1 TIME A WEEK 1/3/22  Yes SHIRA Gabriel CNP   dapagliflozin (FARXIGA) 5 MG tablet Take 1 tablet by mouth every morning 12/8/21  Yes SHIRA Gabriel CNP   levothyroxine (SYNTHROID) 75 MCG tablet Take 1 tablet by mouth Daily 9/29/21  Yes SHIRA Gabriel CNP   HUMALOG KWIKPEN 100 UNIT/ML SOPN ADMINISTER 5 TO 20 UNITS UNDER THE SKIN THREE TIMES DAILY BEFORE MEALS 6/25/21  Yes Jeremias Knox MD   Insulin Degludec (TRESIBA FLEXTOUCH) 200 UNIT/ML SOPN INJECT 36 UNITS UNDER THE SKIN NIGHTLY 4/29/21  Yes SHIRA Gabriel CNP   Selenium (SELENIMIN PO) Take by mouth   Yes Historical Provider, MD   Cyanocobalamin (B-12) 100 MCG TABS    Yes Historical Provider, MD   vitamin D (CHOLECALCIFEROL) 1000 UNIT TABS tablet Take 1,000 Units by mouth daily   Yes Historical Provider, MD   Multiple Vitamins-Minerals (MULTIVITAMIN WOMEN) TABS Take by mouth daily   Yes Historical Provider, MD   Magnesium 400 MG TABS Take by mouth nightly   Yes Historical Provider, MD   cetirizine (ZYRTEC) 10 MG tablet Take 10 mg by mouth daily   Yes Historical Provider, MD   CINNAMON PO Take by mouth   Yes Historical Provider, MD   Dulaglutide (TRULICITY) 1.5 PN/1.3HP SOPN INJECT 1.5 MG INTO THE SKIN ONCE EVERY WEEK 11/25/20   Reva Mathews MD        Allergies:  Lisinopril, Norvasc [amlodipine besylate], Proventil [albuterol sulfate], and Tetanus toxoids     Review of Systems:   · Constitutional: there has been no unanticipated weight loss. There's been no change in energy level, sleep pattern, or activity level. · Eyes: No visual changes or diplopia. No scleral icterus. · ENT: No Headaches, hearing loss or vertigo. No mouth sores or sore throat. · Cardiovascular: Reviewed in HPI  · Respiratory: No cough or wheezing, no sputum production. No hematemesis. · Gastrointestinal: No abdominal pain, appetite loss, blood in stools. No change in bowel or bladder habits. · Genitourinary: No dysuria, trouble voiding, or hematuria. · Musculoskeletal:  No gait disturbance, weakness or joint complaints. · Integumentary: No rash or pruritis. · Neurological: No headache, diplopia, change in muscle strength, numbness or tingling. No change in gait, balance, coordination, mood, affect, memory, mentation, behavior. · Psychiatric: No anxiety, no depression. · Endocrine: No malaise, fatigue or temperature intolerance. No excessive thirst, fluid intake, or urination. No tremor. · Hematologic/Lymphatic: No abnormal bruising or bleeding, blood clots or swollen lymph nodes. · Allergic/Immunologic: No nasal congestion or hives. Physical Examination:    Vitals:    05/03/22 0803   BP: (!) 162/68   Pulse:    SpO2:         Constitutional and General Appearance: NAD   Respiratory:  · Normal excursion and expansion without use of accessory muscles  · Resp Auscultation: Normal breath sounds without dullness  Cardiovascular:  · The apical impulses not displaced  · Heart tones are crisp and normal  · Cervical veins are not engorged  · The carotid upstroke is normal in amplitude and contour without delay or bruit  · Normal S1S2, No S3, No Murmur  · Peripheral pulses are symmetrical and full  · There is no clubbing, cyanosis of the extremities.   · No edema  · Femoral Arteries: 2+ and equal  · Pedal Pulses: 2+ and equal   Abdomen:  · No masses or tenderness  · Liver/Spleen: No Abnormalities Noted  Neurological/Psychiatric:  · Alert and oriented in all spheres  · Moves all extremities well  · Exhibits normal gait balance and coordination  · No abnormalities of mood, affect, memory, mentation, or behavior are noted      Renal artery duplex 9/12/17  1. Technically limited exam due to depth and bowel gas. 2. Within the limits of this exam, there is no evidence to suggest renal  artery stenosis bilaterally. 3. Resistive indexes in the bilateral upper and lower poles are within  normal limits      Echocardiogram 9/17/17  Normal left ventricle systolic function with an estimated ejection fraction  of 55%. No regional wall motion abnormalities are seen. Normal left ventricular diastolic filling pressure. No significant valvular heart disease. Systolic pulmonary artery pressure (SPAP) is normal and estimated at 24 mmHg (RA pressure 3 mmHg). Assessment:     1. Essential hypertension - elevated today but states always normal at home    2. Mixed hyperlipidemia - stable   3. Type 2 diabetes mellitus - followed by pcp    4. CRI (chronic renal insufficiency)   5. Second hand smoke exposure - discussed avoidance   FH of CAD  Statin intolerance due to elevated liver enzymes - discussed alternatives for chol tx    Plan:  CT Cardiac calcium score. Consider Repatha and ASA if elevated  Cardiac medications reviewed including indications and pertinent side effects. Medication list updated at this visit. Check blood pressure and heart rate at home a few times per week  Regular exercise and following a healthy diet encouraged   Follow up with me in 3 motnhs    This note was scribed in the presence of Jenni Navarro MD by Imtiaz Nuñez RN. The scribes documentation has been prepared under my direction and personally reviewed by me in its entirety. I confirm that the note above accurately reflects all work, treatment, procedures, and medical decision making performed by me.     Dr. Jenni Navarro, MD

## 2022-05-03 ENCOUNTER — OFFICE VISIT (OUTPATIENT)
Dept: CARDIOLOGY CLINIC | Age: 67
End: 2022-05-03
Payer: COMMERCIAL

## 2022-05-03 VITALS
OXYGEN SATURATION: 98 % | HEIGHT: 62 IN | WEIGHT: 193 LBS | DIASTOLIC BLOOD PRESSURE: 68 MMHG | BODY MASS INDEX: 35.51 KG/M2 | HEART RATE: 76 BPM | SYSTOLIC BLOOD PRESSURE: 162 MMHG

## 2022-05-03 DIAGNOSIS — Z82.49 FAMILY HISTORY OF HEART DISEASE: ICD-10-CM

## 2022-05-03 DIAGNOSIS — E78.2 MIXED HYPERLIPIDEMIA: ICD-10-CM

## 2022-05-03 DIAGNOSIS — I10 PRIMARY HYPERTENSION: Primary | ICD-10-CM

## 2022-05-03 DIAGNOSIS — Z79.4 TYPE 2 DIABETES MELLITUS WITH DIABETIC NEPHROPATHY, WITH LONG-TERM CURRENT USE OF INSULIN (HCC): ICD-10-CM

## 2022-05-03 DIAGNOSIS — E11.21 TYPE 2 DIABETES MELLITUS WITH DIABETIC NEPHROPATHY, WITH LONG-TERM CURRENT USE OF INSULIN (HCC): ICD-10-CM

## 2022-05-03 PROCEDURE — 99214 OFFICE O/P EST MOD 30 MIN: CPT | Performed by: INTERNAL MEDICINE

## 2022-05-03 NOTE — PATIENT INSTRUCTIONS
Your provider has ordered testing for further evaluation. An order/prescription has been included in your paper work.  To schedule outpatient testing, contact Central Scheduling by calling [address] [place] [address] ([phone]). Plan:  CT Cardiac calcium score. I will call you with results   Cardiac medications reviewed including indications and pertinent side effects. Medication list updated at this visit.    Check blood pressure and heart rate at home a few times per week  Regular exercise and following a healthy diet encouraged   Follow up with me in 3 months

## 2022-05-16 ENCOUNTER — HOSPITAL ENCOUNTER (OUTPATIENT)
Dept: WOMENS IMAGING | Age: 67
Discharge: HOME | End: 2022-05-16
Payer: COMMERCIAL

## 2022-05-16 ENCOUNTER — TELEPHONE (OUTPATIENT)
Dept: CARDIOLOGY CLINIC | Age: 67
End: 2022-05-16

## 2022-05-16 ENCOUNTER — HOSPITAL ENCOUNTER (OUTPATIENT)
Dept: CT IMAGING | Age: 67
Discharge: HOME | End: 2022-05-16
Payer: COMMERCIAL

## 2022-05-16 DIAGNOSIS — Z82.49 FAMILY HISTORY OF HEART DISEASE: ICD-10-CM

## 2022-05-16 DIAGNOSIS — E78.2 MIXED HYPERLIPIDEMIA: ICD-10-CM

## 2022-05-16 DIAGNOSIS — E78.2 MIXED HYPERLIPIDEMIA: Primary | ICD-10-CM

## 2022-05-16 DIAGNOSIS — M85.851 OSTEOPENIA OF RIGHT FEMORAL NECK: ICD-10-CM

## 2022-05-16 PROCEDURE — 77080 DXA BONE DENSITY AXIAL: CPT

## 2022-05-16 PROCEDURE — 75571 CT HRT W/O DYE W/CA TEST: CPT

## 2022-05-16 NOTE — TELEPHONE ENCOUNTER
Recommend starting Aspirin 81 mg daily? Statin intolerance due to elevated liver enzymes. Ok to start prior auth for Repatha?

## 2022-05-16 NOTE — TELEPHONE ENCOUNTER
----- Message from Iban Raines MD sent at 5/16/2022  9:48 AM EDT -----  The patient's calcium score is mildly elevated suggesting mild plaque.

## 2022-05-17 NOTE — TELEPHONE ENCOUNTER
Spoke with pt. She voiced understanding of calcium score results. She is agreeable to starting ASA 81 mg daily and Repatha. Informed pt that she needs to get fasting lipids and repatha PA will be started. Pt voiced understanding, Informed pt she would hear back from our office once repatha was approved.

## 2022-05-17 NOTE — TELEPHONE ENCOUNTER
LMOM for pt to contact the office, if pt is agreeable for repatha she will need to have fasting lipids completed prior to starting PA.

## 2022-05-19 RX ORDER — AMLODIPINE BESYLATE 5 MG/1
5 TABLET ORAL DAILY
Qty: 90 TABLET | Refills: 3 | Status: SHIPPED | OUTPATIENT
Start: 2022-05-19

## 2022-05-19 RX ORDER — CARVEDILOL 25 MG/1
TABLET ORAL
Qty: 180 TABLET | Refills: 3 | Status: SHIPPED | OUTPATIENT
Start: 2022-05-19

## 2022-05-20 ENCOUNTER — HOSPITAL ENCOUNTER (OUTPATIENT)
Age: 67
Discharge: HOME | End: 2022-05-20
Payer: COMMERCIAL

## 2022-05-20 DIAGNOSIS — E78.2 MIXED HYPERLIPIDEMIA: ICD-10-CM

## 2022-05-20 LAB
CHOLESTEROL, TOTAL: 290 MG/DL (ref 0–199)
HDLC SERPL-MCNC: 47 MG/DL (ref 40–60)
LDL CHOLESTEROL CALCULATED: ABNORMAL MG/DL
LDL CHOLESTEROL DIRECT: 191 MG/DL
TRIGL SERPL-MCNC: 388 MG/DL (ref 0–150)
VLDLC SERPL CALC-MCNC: ABNORMAL MG/DL

## 2022-05-20 PROCEDURE — 80061 LIPID PANEL: CPT

## 2022-05-20 PROCEDURE — 36415 COLL VENOUS BLD VENIPUNCTURE: CPT

## 2022-05-23 ENCOUNTER — TELEPHONE (OUTPATIENT)
Dept: CARDIOLOGY CLINIC | Age: 67
End: 2022-05-23

## 2022-05-23 NOTE — TELEPHONE ENCOUNTER
Called and left message for patient to call back. When she calls back let her know- LDL very high and I agree Suzen Holiday is best option. Rhina Edward has started Repatha prior auth. Laureen Le

## 2022-06-14 ENCOUNTER — TELEPHONE (OUTPATIENT)
Dept: CARDIOLOGY CLINIC | Age: 67
End: 2022-06-14

## 2022-06-14 NOTE — TELEPHONE ENCOUNTER
JADA for pt to contact the office and Yane. Spoke with Shell Mclean and let her know  That we attempted to contact pt as well. She v/u.

## 2022-06-14 NOTE — TELEPHONE ENCOUNTER
Shantanu Do from [facility] specialty pharmacy stated that they have tried to reach out pt to inform her that the pa for repatha has been approved. Shantanu Do would like us to try to reach pt to verify address to be able to ship repatha. Shantanu Do can be reached at [phone].

## 2022-07-25 NOTE — TELEPHONE ENCOUNTER
Refill Request         Last Seen: Last Seen Department: 2/9/2022  Last Seen by PCP: 2/9/2022    Last Written: 01/19/2022    Next Appointment:   Future Appointments   Date Time Provider Alfredo Avery   8/8/2022  7:30 AM MD Marina Pozo   8/10/2022  8:00 AM MD Gail Stewart [place] - [facility]       Future appointment scheduled      Requested Prescriptions     Pending Prescriptions Disp Refills    metFORMIN (GLUCOPHAGE) 1000 MG tablet [Pharmacy Med Name: METFORMIN 1000MG TABLETS] 180 tablet 1     Sig: TAKE 1 TABLET BY MOUTH TWICE DAILY WITH MEALS

## 2022-08-05 NOTE — PROGRESS NOTES
[address]   Cardiac Consultation    Referring Provider:  Miquel Tam MD     Chief Complaint   Patient presents with    3 Month Follow-Up    Hyperlipidemia    Hypertension      Nicci Jenkins   1955      History of Present Illness:   Nicci Jenkins is a 79 y.o. female who is here for follow up for coronary artery disease- CT calcium score 297 on 5/16/2022. hypertension, hyperlipidemia. Renal artery US normal in 2017. An echocardiogram from 2017 showed an EF of 55%. Today she states she has been feeling well since her last visit. Her blood pressure at home is running 120/70's. She is tolerating her medications and is taking them as prescribed. She has had 3 doses of Reptha and has had no issues with it so far. She stays active working in her yard and goes up and down stairs with no issues. Some SOB with heavy exertion like walking up a hill. Resolves afater rest. Patient currently denies any weight gain, edema, palpitations, chest pain, shortness of breath, dizziness, and syncope. Past Medical History:   has a past medical history of Asthma, Chronic kidney disease, Hyperlipidemia, Hypertension, Myotonia, and Type II or unspecified type diabetes mellitus without mention of complication, not stated as uncontrolled. Surgical History:   has a past surgical history that includes Spine surgery (1995); Hysterectomy; US BIOPSY LIVER PERCUTANEOUS (8/14/2020); and Ovary removal.     Social History:   reports that she has never smoked. She has never used smokeless tobacco. She reports current alcohol use. She reports that she does not use drugs. Family History:  family history includes Diabetes in her father. Home Medications:  Prior to Admission medications    Medication Sig Start Date End Date Taking?  Authorizing Provider   BRIDGET DIAZICK 760 MG/ML SOAJ Inject 140 mg as directed every 14 days 7/26/22  Yes Historical Provider, MD   metFORMIN (GLUCOPHAGE) 1000 MG tablet TAKE 1 TABLET BY MOUTH TWICE DAILY WITH MEALS 7/25/22  Yes Meghan Chacon MD   amLODIPine (NORVASC) 5 MG tablet TAKE 1 TABLET BY MOUTH DAILY 5/19/22  Yes Jolynn Shepherd MD   carvedilol (COREG) 25 MG tablet TAKE 1 TABLET BY MOUTH TWICE DAILY 5/19/22  Yes Jolynn Shepherd MD   hydroCHLOROthiazide (HYDRODIURIL) 25 MG tablet TAKE 1 TABLET BY MOUTH DAILY 2/18/22  Yes Meghan Chacon MD   Insulin Pen Needle (PEN NEEDLES 31GX5/16\") 31G X 8 MM MISC 1 each by Does not apply route 4 times daily 2/9/22  Yes Meghan Chacon MD   TRULICITY 1.5 PO/5.8JD SOPN ADMINISTER 1.5 MG UNDER THE SKIN 1 TIME A WEEK 1/3/22  Yes SHIRA Khalil CNP   dapagliflozin (FARXIGA) 5 MG tablet Take 1 tablet by mouth every morning 12/8/21  Yes SHIRA Khalil CNP   levothyroxine (SYNTHROID) 75 MCG tablet Take 1 tablet by mouth Daily 9/29/21  Yes SHIRA Khalil CNP   HUMALOG KWIKPEN 100 UNIT/ML SOPN ADMINISTER 5 TO 20 UNITS UNDER THE SKIN THREE TIMES DAILY BEFORE MEALS 6/25/21  Yes Meghan Chacon MD   Insulin Degludec (TRESIBA FLEXTOUCH) 200 UNIT/ML SOPN INJECT 36 UNITS UNDER THE SKIN NIGHTLY 4/29/21  Yes SHIRA Khalil CNP   Selenium (SELENIMIN PO) Take by mouth   Yes Historical Provider, MD   Cyanocobalamin (B-12) 100 MCG TABS    Yes Historical Provider, MD   vitamin D (CHOLECALCIFEROL) 1000 UNIT TABS tablet Take 1,000 Units by mouth daily   Yes Historical Provider, MD   Multiple Vitamins-Minerals (MULTIVITAMIN WOMEN) TABS Take by mouth daily   Yes Historical Provider, MD   Magnesium 400 MG TABS Take by mouth nightly   Yes Historical Provider, MD   cetirizine (ZYRTEC) 10 MG tablet Take 10 mg by mouth daily   Yes Historical Provider, MD   CINNAMON PO Take by mouth   Yes Historical Provider, MD   fenofibrate (TRICOR) 48 MG tablet TAKE 1 TABLET BY MOUTH DAILY  Patient not taking: Reported on 8/8/2022 2/21/22   SHIRA Khalil CNP   Dulaglutide (TRULICITY) 1.5 CX/4.5JA SOPN INJECT 1.5 MG INTO THE SKIN ONCE EVERY WEEK 11/25/20   Joaquin STAPLES Edgar Ewing MD        Allergies:  Lisinopril, Norvasc [amlodipine besylate], Proventil [albuterol sulfate], Statins, and Tetanus toxoids     Review of Systems:   Constitutional: there has been no unanticipated weight loss. There's been no change in energy level, sleep pattern, or activity level. Eyes: No visual changes or diplopia. No scleral icterus. ENT: No Headaches, hearing loss or vertigo. No mouth sores or sore throat. Cardiovascular: Reviewed in HPI  Respiratory: No cough or wheezing, no sputum production. No hematemesis. Gastrointestinal: No abdominal pain, appetite loss, blood in stools. No change in bowel or bladder habits. Genitourinary: No dysuria, trouble voiding, or hematuria. Musculoskeletal:  No gait disturbance, weakness or joint complaints. Integumentary: No rash or pruritis. Neurological: No headache, diplopia, change in muscle strength, numbness or tingling. No change in gait, balance, coordination, mood, affect, memory, mentation, behavior. Psychiatric: No anxiety, no depression. Endocrine: No malaise, fatigue or temperature intolerance. No excessive thirst, fluid intake, or urination. No tremor. Hematologic/Lymphatic: No abnormal bruising or bleeding, blood clots or swollen lymph nodes. Allergic/Immunologic: No nasal congestion or hives. Physical Examination:    Vitals:    08/08/22 0723   BP: 138/68   Pulse: 84   SpO2: 95%          Constitutional and General Appearance: NAD   Respiratory:  Normal excursion and expansion without use of accessory muscles  Resp Auscultation: Normal breath sounds without dullness  Cardiovascular: The apical impulses not displaced  Heart tones are crisp and normal  Cervical veins are not engorged  The carotid upstroke is normal in amplitude and contour without delay or bruit  Normal S1S2, No S3, No Murmur  Peripheral pulses are symmetrical and full  There is no clubbing, cyanosis of the extremities.   No edema  Femoral Arteries: 2+ and equal  Pedal Pulses: 2+ and equal   Abdomen:  No masses or tenderness  Liver/Spleen: No Abnormalities Noted  Neurological/Psychiatric:  Alert and oriented in all spheres  Moves all extremities well  Exhibits normal gait balance and coordination  No abnormalities of mood, affect, memory, mentation, or behavior are noted      Renal artery duplex 9/12/17  1. Technically limited exam due to depth and bowel gas. 2. Within the limits of this exam, there is no evidence to suggest renal  artery stenosis bilaterally. 3. Resistive indexes in the bilateral upper and lower poles are within  normal limits      Echocardiogram 9/17/17  Normal left ventricle systolic function with an estimated ejection fraction  of 55%. No regional wall motion abnormalities are seen. Normal left ventricular diastolic filling pressure. No significant valvular heart disease. Systolic pulmonary artery pressure (SPAP) is normal and estimated at 24 mmHg (RA pressure 3 mmHg). CT calcium score 5/16/2022  Total calcium score of 297 is within the 80th percentile for the patient's age of 78 y/o . Assessment:   Shortness of breath with exertion   Coronary artery disease- noted on CT calcium score 5/16/2022, score 297  Hypertension- elevated today but states always normal at home  Hyperlipidemia- poor control but started Repatha 6 weeks ago  Diabetes mellitus- followed by PCP   Chronic renal insufficiency   Second hand smoke exposure - discussed avoidance   Family history of heart diease   Statin intolerance due to elevated liver enzymes - discussed alternatives for chol tx      Plan:  Continue Aspirin 81 mg daily   Continue Repatha- will recheck fasting lipids in around one month   Stress echocardiogram due to elevated CT calcium score and shortness of breath with exertion   Cardiac medications reviewed including indications and pertinent side effects. Medication list updated at this visit.    Check blood pressure and heart rate at home a few times per week- keep a log with dates and times and bring to office visit   Regular exercise and following a healthy diet encouraged   Follow up with me in 1 year     The scribes documentation has been prepared under my direction and personally reviewed by me in its entirety. I confirm that the note above accurately reflects all work, treatment, procedures, and medical decision making performed by me. Dr. Aidee Quach MD      Scribe's attestation:   This note was scribed in the presence of Dr. Aidee Quach M.D. By Nba Kidd RN

## 2022-08-08 ENCOUNTER — OFFICE VISIT (OUTPATIENT)
Dept: CARDIOLOGY CLINIC | Age: 67
End: 2022-08-08
Payer: COMMERCIAL

## 2022-08-08 VITALS
OXYGEN SATURATION: 95 % | DIASTOLIC BLOOD PRESSURE: 68 MMHG | WEIGHT: 183 LBS | BODY MASS INDEX: 33.68 KG/M2 | HEIGHT: 62 IN | HEART RATE: 84 BPM | SYSTOLIC BLOOD PRESSURE: 138 MMHG

## 2022-08-08 DIAGNOSIS — R06.02 SOB (SHORTNESS OF BREATH): ICD-10-CM

## 2022-08-08 DIAGNOSIS — I49.9 IRREGULAR HEART RATE: Primary | ICD-10-CM

## 2022-08-08 DIAGNOSIS — I25.10 CORONARY ARTERY DISEASE INVOLVING NATIVE CORONARY ARTERY OF NATIVE HEART WITHOUT ANGINA PECTORIS: ICD-10-CM

## 2022-08-08 DIAGNOSIS — E78.2 MIXED HYPERLIPIDEMIA: ICD-10-CM

## 2022-08-08 PROCEDURE — 1123F ACP DISCUSS/DSCN MKR DOCD: CPT | Performed by: INTERNAL MEDICINE

## 2022-08-08 PROCEDURE — 99214 OFFICE O/P EST MOD 30 MIN: CPT | Performed by: INTERNAL MEDICINE

## 2022-08-08 PROCEDURE — 93000 ELECTROCARDIOGRAM COMPLETE: CPT | Performed by: INTERNAL MEDICINE

## 2022-08-08 RX ORDER — EVOLOCUMAB 140 MG/ML
140 INJECTION, SOLUTION SUBCUTANEOUS
COMMUNITY
Start: 2022-07-26

## 2022-08-08 NOTE — PATIENT INSTRUCTIONS
Plan:  Continue Aspirin 81 mg daily   Continue Repatha- will recheck fasting lipids in around one month   Stress echocardiogram due to elevated CT calcium score and shortness of breath with exertion   Cardiac medications reviewed including indications and pertinent side effects. Medication list updated at this visit. Check blood pressure and heart rate at home a few times per week- keep a log with dates and times and bring to office visit   Regular exercise and following a healthy diet encouraged   Follow up with me in 1 year   Your provider has ordered testing for further evaluation. An order/prescription has been included in your paper work. To schedule outpatient testing, contact Central Scheduling by calling [address] [place] [address] ([phone]).

## 2022-08-10 ENCOUNTER — OFFICE VISIT (OUTPATIENT)
Dept: FAMILY MEDICINE CLINIC | Age: 67
End: 2022-08-10
Payer: COMMERCIAL

## 2022-08-10 VITALS
WEIGHT: 185.6 LBS | OXYGEN SATURATION: 98 % | BODY MASS INDEX: 34.16 KG/M2 | HEIGHT: 62 IN | DIASTOLIC BLOOD PRESSURE: 70 MMHG | SYSTOLIC BLOOD PRESSURE: 126 MMHG | HEART RATE: 80 BPM

## 2022-08-10 DIAGNOSIS — N18.32 STAGE 3B CHRONIC KIDNEY DISEASE (HCC): ICD-10-CM

## 2022-08-10 DIAGNOSIS — K75.81 NASH (NONALCOHOLIC STEATOHEPATITIS): ICD-10-CM

## 2022-08-10 DIAGNOSIS — Z23 NEED FOR VACCINATION FOR PNEUMOCOCCUS: ICD-10-CM

## 2022-08-10 DIAGNOSIS — E78.2 MIXED HYPERLIPIDEMIA: ICD-10-CM

## 2022-08-10 DIAGNOSIS — I25.10 CORONARY ARTERY DISEASE INVOLVING NATIVE CORONARY ARTERY OF NATIVE HEART WITHOUT ANGINA PECTORIS: ICD-10-CM

## 2022-08-10 DIAGNOSIS — I10 ESSENTIAL HYPERTENSION: Primary | ICD-10-CM

## 2022-08-10 DIAGNOSIS — E11.21 TYPE 2 DIABETES MELLITUS WITH DIABETIC NEPHROPATHY, WITH LONG-TERM CURRENT USE OF INSULIN (HCC): ICD-10-CM

## 2022-08-10 DIAGNOSIS — Z79.4 TYPE 2 DIABETES MELLITUS WITH DIABETIC NEPHROPATHY, WITH LONG-TERM CURRENT USE OF INSULIN (HCC): ICD-10-CM

## 2022-08-10 DIAGNOSIS — E03.9 HYPOTHYROIDISM, UNSPECIFIED TYPE: ICD-10-CM

## 2022-08-10 PROCEDURE — 1123F ACP DISCUSS/DSCN MKR DOCD: CPT | Performed by: FAMILY MEDICINE

## 2022-08-10 PROCEDURE — 90677 PCV20 VACCINE IM: CPT | Performed by: FAMILY MEDICINE

## 2022-08-10 PROCEDURE — 90471 IMMUNIZATION ADMIN: CPT | Performed by: FAMILY MEDICINE

## 2022-08-10 PROCEDURE — 99214 OFFICE O/P EST MOD 30 MIN: CPT | Performed by: FAMILY MEDICINE

## 2022-08-10 NOTE — PROGRESS NOTES
Angela Vines presents for   Chief Complaint   Patient presents with    Hypertension     Pt states that she is here for a 6 month f/u, is fasting for bw         ASSESSMENT:   Diagnosis Orders   1. Essential hypertension, stable continue amlodipine 5 mg, carvedilol 25 mg twice a day HCTZ 25 mg a day and losartan 25       2. Mixed hyperlipidemia, now managed by cardiology she is on Repatha and fenofibrate       3. Type 2 diabetes mellitus with diabetic nephropathy, with long-term current use of insulin ([facility] [address] still poorly controlled plan per endocrinology continue Farxiga 5 mg, metformin 1000 mg twice a day, Trulicity 3 mg weekly, Tresiba and Humalog patient's latest A1c was 8.5. Continue follow-up with Endo       4. Stage 3b chronic kidney disease ([facility] [address] stable latest GFR from 1 month ago was 40       5. Hypothyroidism, unspecified type, euthyroid plan per endocrinology       6. RASHEED (nonalcoholic steatohepatitis), patient has a referral to GI she plans on scheduling       7. Coronary artery disease involving native coronary artery of native heart without angina pectoris, continue aspirin 81 mg and Repatha. Patient is scheduled for a stress echo continue follow-up with cardiology       8. Need for vaccination for pneumococcus, this completes her series Pneumococcal, PCV20, PREVNAR 21, (age 25 yrs+), IM, PF           Plan:  1)  Medication: continue current medication regimen unchanged, medications are working and tolerated, continue as listed  2)  Recheck in 6 months, sooner should new symptoms or problems arise. 3) LLR, reviewed          SUBJECTIVE:  Angela Vines is a 79 y.o. female who presents for evaluation of diabetes, CAD, CKD, hypothyroid, Rasheed hypertension and hyperlipidemia. She indicates that she is feeling well and denies any symptoms referable to her elevated blood pressure. Specifically denies chest pain, palpitations, dyspnea, orthopnea, PND or peripheral edema or neuro sx.   No anorexia, arthralgia, or leg cramps noted. Current medication regimen is as listed below. She denies any side effects of medication, and has been taking it regularly. Lab Results   Component Value Date    LDLCALC see below 05/20/2022    LDLDIRECT 191 (H) 05/20/2022       Patient had COVID 3 weeks ago. She still has a little hoarseness to her voice otherwise she says she is recovered and back to work. She has been seeing her specialist.  This week she saw Dr. Antoine Cunningham cardiology. That note was reviewed. She is scheduled for stress echo to further evaluate her CAD. He has her on Repatha for her mixed hyperlipidemia. She has instructions to get a lipid panel after her fourth dose. She currently has had 3 doses. Patient saw endocrinology 1 month ago. His note was reviewed his labs were reviewed. He manages her uncontrolled diabetes and her hypothyroidism. Her current dose of levothyroxine is one half of a 75 MCG pill. Her diabetes regimen includes metformin 1000 mg twice a day, Trulicity 3 mg weekly, Tresiba and Humalog insulins and Farxiga 5 mg. She will have lab work done again in 2 months. Her latest BUN/creatinine were from 1 month ago, BUN 33 creatinine 1.4 with a GFR 40. Patient is in need of a Prevnar 20 we will take care of that today and complete her pneumococcal series. She has a mammogram scheduled for October.   Endocrinology referred her to LAINE Aranda for evaluation of her RASHEED    Current Outpatient Medications   Medication Sig Dispense Refill    REPATHA SURECLICK 090 MG/ML SOAJ Inject 140 mg as directed every 14 days      metFORMIN (GLUCOPHAGE) 1000 MG tablet TAKE 1 TABLET BY MOUTH TWICE DAILY WITH MEALS 180 tablet 1    amLODIPine (NORVASC) 5 MG tablet TAKE 1 TABLET BY MOUTH DAILY 90 tablet 3    carvedilol (COREG) 25 MG tablet TAKE 1 TABLET BY MOUTH TWICE DAILY 180 tablet 3    fenofibrate (TRICOR) 48 MG tablet TAKE 1 TABLET BY MOUTH DAILY 30 tablet 3    hydroCHLOROthiazide (HYDRODIURIL) 25 MG

## 2022-08-24 DIAGNOSIS — I10 ESSENTIAL HYPERTENSION: ICD-10-CM

## 2022-08-24 RX ORDER — HYDROCHLOROTHIAZIDE 25 MG/1
25 TABLET ORAL DAILY
Qty: 90 TABLET | Refills: 1 | Status: SHIPPED | OUTPATIENT
Start: 2022-08-24

## 2022-08-24 NOTE — TELEPHONE ENCOUNTER
[facility] [phone] (home) [phone] (work)   is requesting refill(s) of medication Hydrochlorothiazide  to preferred pharmacy [facility] Vinh Wardor 8/10/22 (pertaining to medication)   Last refill 2/18/22 (per medication requested)  Next office visit scheduled or attempted Yes  Date 2/15/22  If No, reason

## 2022-09-02 ENCOUNTER — HOSPITAL ENCOUNTER (OUTPATIENT)
Age: 67
Discharge: HOME | End: 2022-09-02
Payer: COMMERCIAL

## 2022-09-02 DIAGNOSIS — E78.2 MIXED HYPERLIPIDEMIA: ICD-10-CM

## 2022-09-02 LAB
CHOLESTEROL, TOTAL: 141 MG/DL (ref 0–199)
HDLC SERPL-MCNC: 43 MG/DL (ref 40–60)
LDL CHOLESTEROL CALCULATED: 39 MG/DL
TRIGL SERPL-MCNC: 297 MG/DL (ref 0–150)
VLDLC SERPL CALC-MCNC: 59 MG/DL

## 2022-09-02 PROCEDURE — 36415 COLL VENOUS BLD VENIPUNCTURE: CPT

## 2022-09-02 PROCEDURE — 80061 LIPID PANEL: CPT

## 2022-09-06 ENCOUNTER — TELEPHONE (OUTPATIENT)
Dept: CARDIOLOGY CLINIC | Age: 67
End: 2022-09-06

## 2022-09-06 NOTE — TELEPHONE ENCOUNTER
----- Message from Dav Kelley MD sent at 9/5/2022  5:44 PM EDT -----  Please notify patient that their TG are aelevated but o/w chol ok  Rec low carb diet

## 2022-09-09 ENCOUNTER — HOSPITAL ENCOUNTER (OUTPATIENT)
Dept: NON INVASIVE DIAGNOSTICS | Age: 67
Discharge: HOME | End: 2022-09-09

## 2022-09-09 DIAGNOSIS — I25.10 CORONARY ARTERY DISEASE INVOLVING NATIVE CORONARY ARTERY OF NATIVE HEART WITHOUT ANGINA PECTORIS: ICD-10-CM

## 2022-09-09 DIAGNOSIS — R06.02 SOB (SHORTNESS OF BREATH): ICD-10-CM

## 2022-09-09 NOTE — PROGRESS NOTES
Pt was scheduled a 8:30am apt stress echo under the 8:30 complete echo time slot. Stress lab did not have pt under schedule and was unable to add on due to scheduling. Pt was notified to reschedule apt. PT was checked in and arrived early for apt, aprox wait time of 30min in waiting area.

## 2022-09-14 ENCOUNTER — HOSPITAL ENCOUNTER (OUTPATIENT)
Dept: CARDIOLOGY | Age: 67
Discharge: HOME | End: 2022-09-14
Payer: COMMERCIAL

## 2022-09-14 LAB
LV EF: 55 %
LV EF: 55 %
LVEF MODALITY: NORMAL
LVEF MODALITY: NORMAL

## 2022-09-14 PROCEDURE — 93320 DOPPLER ECHO COMPLETE: CPT

## 2022-09-14 PROCEDURE — 93351 STRESS TTE COMPLETE: CPT

## 2022-09-14 NOTE — PROGRESS NOTES
A stress echo stress test was completed on this patient as ordered. The patient tolerated the procedure well.

## 2022-09-15 ENCOUNTER — TELEPHONE (OUTPATIENT)
Dept: CARDIOLOGY CLINIC | Age: 67
End: 2022-09-15

## 2022-09-15 NOTE — TELEPHONE ENCOUNTER
----- Message from Caamcho Galvan MD sent at 9/15/2022  1:06 PM EDT -----  Please notify patient that their stress test is normal

## 2022-10-20 ENCOUNTER — HOSPITAL ENCOUNTER (OUTPATIENT)
Dept: WOMENS IMAGING | Age: 67
Discharge: HOME | End: 2022-10-20
Payer: COMMERCIAL

## 2022-10-20 VITALS — HEIGHT: 62 IN | WEIGHT: 180 LBS | BODY MASS INDEX: 33.13 KG/M2

## 2022-10-20 DIAGNOSIS — Z12.31 VISIT FOR SCREENING MAMMOGRAM: ICD-10-CM

## 2022-10-20 PROCEDURE — 77063 BREAST TOMOSYNTHESIS BI: CPT

## 2022-11-28 DIAGNOSIS — Z79.4 TYPE 2 DIABETES MELLITUS WITH DIABETIC NEPHROPATHY, WITH LONG-TERM CURRENT USE OF INSULIN (HCC): ICD-10-CM

## 2022-11-28 DIAGNOSIS — E11.21 TYPE 2 DIABETES MELLITUS WITH DIABETIC NEPHROPATHY, WITH LONG-TERM CURRENT USE OF INSULIN (HCC): ICD-10-CM

## 2022-11-29 NOTE — TELEPHONE ENCOUNTER
Derrick Durant is requesting refill(s) humalog  Last OV 8/10/22 (pertaining to medication)  LR 6/25/21 (per medication requested)  Next office visit scheduled or attempted Yes   If no, reason:  2/15/23

## 2022-12-01 RX ORDER — INSULIN LISPRO 100 [IU]/ML
INJECTION, SOLUTION INTRAVENOUS; SUBCUTANEOUS
Qty: 18 ADJUSTABLE DOSE PRE-FILLED PEN SYRINGE | Refills: 5 | OUTPATIENT
Start: 2022-12-01

## 2022-12-01 NOTE — TELEPHONE ENCOUNTER
Patient was advised and states she will speak to [facility] to have them send to endocrinologist.

## 2023-01-27 NOTE — TELEPHONE ENCOUNTER
Clemencia Perez is requesting refill(s) metformin  Last OV 8/10/22 (pertaining to medication)  LR 7/25/22 (per medication requested)  Next office visit scheduled or attempted Yes   If no, reason:  2/15/23

## 2023-02-15 ENCOUNTER — OFFICE VISIT (OUTPATIENT)
Dept: FAMILY MEDICINE CLINIC | Age: 68
End: 2023-02-15

## 2023-02-15 VITALS
HEART RATE: 66 BPM | BODY MASS INDEX: 35.15 KG/M2 | HEIGHT: 62 IN | WEIGHT: 191 LBS | SYSTOLIC BLOOD PRESSURE: 130 MMHG | OXYGEN SATURATION: 98 % | DIASTOLIC BLOOD PRESSURE: 60 MMHG

## 2023-02-15 DIAGNOSIS — E11.21 TYPE 2 DIABETES MELLITUS WITH DIABETIC NEPHROPATHY, WITH LONG-TERM CURRENT USE OF INSULIN (HCC): ICD-10-CM

## 2023-02-15 DIAGNOSIS — I25.10 CORONARY ARTERY DISEASE INVOLVING NATIVE CORONARY ARTERY OF NATIVE HEART WITHOUT ANGINA PECTORIS: ICD-10-CM

## 2023-02-15 DIAGNOSIS — I10 ESSENTIAL HYPERTENSION: Primary | ICD-10-CM

## 2023-02-15 DIAGNOSIS — E78.2 MIXED HYPERLIPIDEMIA: ICD-10-CM

## 2023-02-15 DIAGNOSIS — N18.32 STAGE 3B CHRONIC KIDNEY DISEASE (HCC): ICD-10-CM

## 2023-02-15 DIAGNOSIS — K75.81 NASH (NONALCOHOLIC STEATOHEPATITIS): ICD-10-CM

## 2023-02-15 DIAGNOSIS — Z79.4 TYPE 2 DIABETES MELLITUS WITH DIABETIC NEPHROPATHY, WITH LONG-TERM CURRENT USE OF INSULIN (HCC): ICD-10-CM

## 2023-02-15 DIAGNOSIS — E03.9 HYPOTHYROIDISM, UNSPECIFIED TYPE: ICD-10-CM

## 2023-02-15 LAB
A/G RATIO: 1.3 (ref 1.1–2.2)
ALBUMIN SERPL-MCNC: 3.9 G/DL (ref 3.4–5)
ALP BLD-CCNC: 69 U/L (ref 40–129)
ALT SERPL-CCNC: 30 U/L (ref 10–40)
ANION GAP SERPL CALCULATED.3IONS-SCNC: 12 MMOL/L (ref 3–16)
AST SERPL-CCNC: 24 U/L (ref 15–37)
BILIRUB SERPL-MCNC: <0.2 MG/DL (ref 0–1)
BUN BLDV-MCNC: 25 MG/DL (ref 7–20)
CALCIUM SERPL-MCNC: 9.4 MG/DL (ref 8.3–10.6)
CHLORIDE BLD-SCNC: 102 MMOL/L (ref 99–110)
CHOLESTEROL, TOTAL: 176 MG/DL (ref 0–199)
CO2: 26 MMOL/L (ref 21–32)
CREAT SERPL-MCNC: 1.1 MG/DL (ref 0.6–1.2)
GFR SERPL CREATININE-BSD FRML MDRD: 55 ML/MIN/{1.73_M2}
GLUCOSE BLD-MCNC: 230 MG/DL (ref 70–99)
HDLC SERPL-MCNC: 58 MG/DL (ref 40–60)
LDL CHOLESTEROL CALCULATED: 62 MG/DL
POTASSIUM SERPL-SCNC: 4.8 MMOL/L (ref 3.5–5.1)
SODIUM BLD-SCNC: 140 MMOL/L (ref 136–145)
TOTAL PROTEIN: 6.8 G/DL (ref 6.4–8.2)
TRIGL SERPL-MCNC: 280 MG/DL (ref 0–150)
VLDLC SERPL CALC-MCNC: 56 MG/DL

## 2023-02-15 RX ORDER — DULAGLUTIDE 4.5 MG/.5ML
4.5 INJECTION, SOLUTION SUBCUTANEOUS WEEKLY
COMMUNITY

## 2023-02-15 ASSESSMENT — PATIENT HEALTH QUESTIONNAIRE - PHQ9
5. POOR APPETITE OR OVEREATING: 0
1. LITTLE INTEREST OR PLEASURE IN DOING THINGS: 0
7. TROUBLE CONCENTRATING ON THINGS, SUCH AS READING THE NEWSPAPER OR WATCHING TELEVISION: 0
2. FEELING DOWN, DEPRESSED OR HOPELESS: 0
4. FEELING TIRED OR HAVING LITTLE ENERGY: 0
SUM OF ALL RESPONSES TO PHQ QUESTIONS 1-9: 0
6. FEELING BAD ABOUT YOURSELF - OR THAT YOU ARE A FAILURE OR HAVE LET YOURSELF OR YOUR FAMILY DOWN: 0
9. THOUGHTS THAT YOU WOULD BE BETTER OFF DEAD, OR OF HURTING YOURSELF: 0
3. TROUBLE FALLING OR STAYING ASLEEP: 0
SUM OF ALL RESPONSES TO PHQ QUESTIONS 1-9: 0
8. MOVING OR SPEAKING SO SLOWLY THAT OTHER PEOPLE COULD HAVE NOTICED. OR THE OPPOSITE, BEING SO FIGETY OR RESTLESS THAT YOU HAVE BEEN MOVING AROUND A LOT MORE THAN USUAL: 0
10. IF YOU CHECKED OFF ANY PROBLEMS, HOW DIFFICULT HAVE THESE PROBLEMS MADE IT FOR YOU TO DO YOUR WORK, TAKE CARE OF THINGS AT HOME, OR GET ALONG WITH OTHER PEOPLE: 0
SUM OF ALL RESPONSES TO PHQ QUESTIONS 1-9: 0
SUM OF ALL RESPONSES TO PHQ QUESTIONS 1-9: 0
SUM OF ALL RESPONSES TO PHQ9 QUESTIONS 1 & 2: 0

## 2023-02-15 NOTE — PROGRESS NOTES
Clemencia Perez presents for   Chief Complaint   Patient presents with    Hypertension     Pt states that she is here for a  6 month f/u, is fasting for bw    Hyperlipidemia        ASSESSMENT:   Diagnosis Orders   1. Essential hypertension, stable continue regimen of amlodipine 5 mg, carvedilol 25 mg twice daily, HCTZ 25 mg daily and losartan 25 mg daily Comprehensive Metabolic Panel      2. Type 2 diabetes mellitus with diabetic nephropathy, with long-term current use of insulin (HCC), continue follow-up with endocrinology       3. Stage 3b chronic kidney disease (HCC), labs are pending Comprehensive Metabolic Panel      4. Mixed hyperlipidemia, labs are pending patient is on Repatha and fenofibrate managed by cardiology Lipid Panel      5. RASHEED (nonalcoholic steatohepatitis), continue GI follow-up       6. Hypothyroidism, unspecified type, continue endocrinology follow-up       7. Coronary artery disease involving native coronary artery of native heart without angina pectoris, continue cardiology follow-up            Plan:  1)  Medication: continue current medication regimen unchanged, medications are working and tolerated, continue as listed  2)  Recheck in 6 months, sooner should new symptoms or problems arise.  3) LLR          SUBJECTIVE:  Clemencia Perez is a 67 y.o. female who presents for evaluation of diabetes, CKD, hypothyroidism, Rasheed, CAD, hypertension and hyperlipidemia. She indicates that she is feeling well and denies any symptoms referable to her elevated blood pressure.   Specifically denies chest pain, palpitations, dyspnea, orthopnea, PND or peripheral edema or neuro sx.  No anorexia, arthralgia, or leg cramps noted. Current medication regimen is as listed below. She denies any side effects of medication, and has been taking it regularly.   Lab Results   Component Value Date    LDLCALC 39 09/02/2022    LDLDIRECT 191 (H) 05/20/2022       Patient is here for 6-month follow-up.  Medication list was  reviewed and corrected. He last saw endocrinology in October. That note was reviewed and medications updated. Patient's A1c at that time was 6.9. She is due to see him again next month. Patient's last BUN/creatinine were 24 and 1.27 with a GFR of 46. We will reassess this today. Patient sees cardiology for management of her mixed hyperlipidemia and her CAD. She is on Repatha and fenofibrate. Her last lipids were done in September 2022. We will recheck these today. She is not due to see cardiology again until August 2023. Patient had a stress echo done in the fall 2022. This was also reviewed. Patient has Judi Belloh. She saw GI in October. They are monitoring her liver functions. Patient also sees endocrinology for her hypothyroidism. Patient's blood pressure has been managed with 4 agents. Amlodipine 5 mg, carvedilol 25 mg twice daily, HCTZ 25 mg and losartan 25 mg.     Current Outpatient Medications   Medication Sig Dispense Refill    dapagliflozin (FARXIGA) 10 MG tablet Take 10 mg by mouth every morning      metFORMIN (GLUCOPHAGE) 500 MG tablet Take 500 mg by mouth 2 times daily (with meals)      Dulaglutide (TRULICITY) 4.5 /4.6UY SOPN Inject 4.5 mg into the skin once a week      hydroCHLOROthiazide (HYDRODIURIL) 25 MG tablet TAKE 1 TABLET BY MOUTH DAILY 90 tablet 1    REPATHA SURECLICK 978 MG/ML SOAJ Inject 140 mg as directed every 14 days      amLODIPine (NORVASC) 5 MG tablet TAKE 1 TABLET BY MOUTH DAILY 90 tablet 3    carvedilol (COREG) 25 MG tablet TAKE 1 TABLET BY MOUTH TWICE DAILY 180 tablet 3    fenofibrate (TRICOR) 48 MG tablet TAKE 1 TABLET BY MOUTH DAILY 30 tablet 3    Insulin Pen Needle (PEN NEEDLES 31GX5/16\") 31G X 8 MM MISC 1 each by Does not apply route 4 times daily 150 each 5    levothyroxine (SYNTHROID) 75 MCG tablet Take 1 tablet by mouth Daily 30 tablet 5    HUMALOG KWIKPEN 100 UNIT/ML SOPN ADMINISTER 5 TO 20 UNITS UNDER THE SKIN THREE TIMES DAILY BEFORE MEALS 18 pen 5    Insulin Degludec (TRESIBA FLEXTOUCH) 200 UNIT/ML SOPN INJECT 36 UNITS UNDER THE SKIN NIGHTLY 9 mL 3    Selenium (SELENIMIN PO) Take by mouth      Cyanocobalamin (B-12) 100 MCG TABS       vitamin D (CHOLECALCIFEROL) 1000 UNIT TABS tablet Take 1,000 Units by mouth daily      Multiple Vitamins-Minerals (MULTIVITAMIN WOMEN) TABS Take by mouth daily      Magnesium 400 MG TABS Take by mouth nightly      cetirizine (ZYRTEC) 10 MG tablet Take 10 mg by mouth daily      CINNAMON PO Take by mouth       No current facility-administered medications for this visit. Allergies   Allergen Reactions    Lisinopril     Norvasc [Amlodipine Besylate] Other (See Comments)     edema    Proventil [Albuterol Sulfate] Other (See Comments)     Paralyzed vocal cords    Statins      Elevated liver enzymes    Tetanus Toxoids        Social History     Tobacco Use    Smoking status: Never    Smokeless tobacco: Never   Substance Use Topics    Alcohol use: Yes     Comment: occasionally        OBJECTIVE:   /60   Pulse 66   Ht 5' 2\" (1.575 m)   Wt 191 lb (86.6 kg)   SpO2 98%   BMI 34.93 kg/m²   NAD  Neck no bruit or JVD  S1 and S2 normal, no murmurs, clicks, gallops or rubs. Regular rate and rhythm. Chest is clear; no wheezes or rales. No edema or JVD. Neuro alert, no CN or motor deficits  Psych nl mood, thought and judgement                EMR Dragon/transcription disclaimer:  Much of this encounter note is electronic transcription/translation of spoken language to printed texts. The electronic translation of spoken language may be erroneous, or at times, nonsensical words or phrases may be inadvertently transcribed.   Although I have reviewed the note for such errors, some may still exist.

## 2023-03-04 DIAGNOSIS — I10 ESSENTIAL HYPERTENSION: ICD-10-CM

## 2023-03-06 RX ORDER — HYDROCHLOROTHIAZIDE 25 MG/1
25 TABLET ORAL DAILY
Qty: 90 TABLET | Refills: 1 | Status: SHIPPED | OUTPATIENT
Start: 2023-03-06

## 2023-03-06 NOTE — TELEPHONE ENCOUNTER
Paty Shaikh is requesting refill(s) HCTZ  Last OV 2/15/23 (pertaining to medication)  LR 8/24/22 (per medication requested)  Next office visit scheduled or attempted Yes   If no, reason:  8/16/23

## 2023-03-14 LAB — DIABETIC RETINOPATHY: NEGATIVE

## 2023-05-03 ENCOUNTER — TELEPHONE (OUTPATIENT)
Dept: CARDIOLOGY CLINIC | Age: 68
End: 2023-05-03

## 2023-05-03 NOTE — TELEPHONE ENCOUNTER
Received a call regarding Eddi RUTH. Case #: [id number]. She reports the case is STAT and is now being processed.

## 2023-05-30 RX ORDER — AMLODIPINE BESYLATE 5 MG/1
5 TABLET ORAL DAILY
Qty: 90 TABLET | Refills: 0 | Status: SHIPPED | OUTPATIENT
Start: 2023-05-30

## 2023-05-30 RX ORDER — CARVEDILOL 25 MG/1
TABLET ORAL
Qty: 180 TABLET | Refills: 0 | Status: SHIPPED | OUTPATIENT
Start: 2023-05-30

## 2023-05-30 NOTE — TELEPHONE ENCOUNTER
8/8/2022 [facility]  8/9/2023 upcoming [facility] appt

## 2023-06-02 ENCOUNTER — TELEPHONE (OUTPATIENT)
Dept: CARDIOLOGY CLINIC | Age: 68
End: 2023-06-02

## 2023-06-02 RX ORDER — EVOLOCUMAB 140 MG/ML
140 INJECTION, SOLUTION SUBCUTANEOUS
Qty: 2 ADJUSTABLE DOSE PRE-FILLED PEN SYRINGE | Refills: 11 | Status: SHIPPED | OUTPATIENT
Start: 2023-06-02

## 2023-06-02 NOTE — TELEPHONE ENCOUNTER
Suraj Riddle from [employer] [employer] and stated they needed new script for Repatha Sureclick 500 mg/ML [facility] sent to     [address], [address]   [address], [address] [place] [id number]   Phone:  [phone]  Fax:  [phone]

## 2023-08-09 ENCOUNTER — OFFICE VISIT (OUTPATIENT)
Dept: CARDIOLOGY CLINIC | Age: 68
End: 2023-08-09
Payer: MEDICARE

## 2023-08-09 VITALS
OXYGEN SATURATION: 97 % | BODY MASS INDEX: 35.15 KG/M2 | SYSTOLIC BLOOD PRESSURE: 174 MMHG | DIASTOLIC BLOOD PRESSURE: 76 MMHG | HEIGHT: 62 IN | WEIGHT: 191 LBS | HEART RATE: 56 BPM

## 2023-08-09 DIAGNOSIS — I10 PRIMARY HYPERTENSION: Primary | ICD-10-CM

## 2023-08-09 PROCEDURE — 3074F SYST BP LT 130 MM HG: CPT | Performed by: INTERNAL MEDICINE

## 2023-08-09 PROCEDURE — 1123F ACP DISCUSS/DSCN MKR DOCD: CPT | Performed by: INTERNAL MEDICINE

## 2023-08-09 PROCEDURE — G8399 PT W/DXA RESULTS DOCUMENT: HCPCS | Performed by: INTERNAL MEDICINE

## 2023-08-09 PROCEDURE — G8417 CALC BMI ABV UP PARAM F/U: HCPCS | Performed by: INTERNAL MEDICINE

## 2023-08-09 PROCEDURE — 3017F COLORECTAL CA SCREEN DOC REV: CPT | Performed by: INTERNAL MEDICINE

## 2023-08-09 PROCEDURE — G8427 DOCREV CUR MEDS BY ELIG CLIN: HCPCS | Performed by: INTERNAL MEDICINE

## 2023-08-09 PROCEDURE — 1090F PRES/ABSN URINE INCON ASSESS: CPT | Performed by: INTERNAL MEDICINE

## 2023-08-09 PROCEDURE — 1036F TOBACCO NON-USER: CPT | Performed by: INTERNAL MEDICINE

## 2023-08-09 PROCEDURE — 99214 OFFICE O/P EST MOD 30 MIN: CPT | Performed by: INTERNAL MEDICINE

## 2023-08-09 PROCEDURE — 3078F DIAST BP <80 MM HG: CPT | Performed by: INTERNAL MEDICINE

## 2023-08-09 RX ORDER — VALSARTAN 80 MG/1
80 TABLET ORAL NIGHTLY
Qty: 90 TABLET | Refills: 3 | Status: SHIPPED | OUTPATIENT
Start: 2023-08-09

## 2023-08-09 NOTE — PATIENT INSTRUCTIONS
Plan:  ~ Recommend valsartan 80 mg nightly   ~ BMP in 1 week   ~Recommend low carb diet   Cardiac medications reviewed including indications and pertinent side effects. Medication list updated at this visit.    Check blood pressure and heart rate at home a few times per week- keep a log with dates and times and bring to office visit   Regular exercise and following a healthy diet encouraged   Follow up with me

## 2023-08-16 ENCOUNTER — OFFICE VISIT (OUTPATIENT)
Dept: FAMILY MEDICINE CLINIC | Age: 68
End: 2023-08-16

## 2023-08-16 VITALS
WEIGHT: 191.6 LBS | BODY MASS INDEX: 35.26 KG/M2 | HEIGHT: 62 IN | DIASTOLIC BLOOD PRESSURE: 70 MMHG | HEART RATE: 66 BPM | OXYGEN SATURATION: 97 % | SYSTOLIC BLOOD PRESSURE: 140 MMHG

## 2023-08-16 DIAGNOSIS — I10 ESSENTIAL HYPERTENSION: Primary | ICD-10-CM

## 2023-08-16 DIAGNOSIS — N18.32 STAGE 3B CHRONIC KIDNEY DISEASE (HCC): ICD-10-CM

## 2023-08-16 DIAGNOSIS — Z79.4 TYPE 2 DIABETES MELLITUS WITH DIABETIC NEPHROPATHY, WITH LONG-TERM CURRENT USE OF INSULIN (HCC): ICD-10-CM

## 2023-08-16 DIAGNOSIS — I25.10 CORONARY ARTERY DISEASE INVOLVING NATIVE CORONARY ARTERY OF NATIVE HEART WITHOUT ANGINA PECTORIS: ICD-10-CM

## 2023-08-16 DIAGNOSIS — E11.21 TYPE 2 DIABETES MELLITUS WITH DIABETIC NEPHROPATHY, WITH LONG-TERM CURRENT USE OF INSULIN (HCC): ICD-10-CM

## 2023-08-16 DIAGNOSIS — E78.2 MIXED HYPERLIPIDEMIA: ICD-10-CM

## 2023-08-16 DIAGNOSIS — E66.01 SEVERE OBESITY (BMI 35.0-39.9) WITH COMORBIDITY (HCC): ICD-10-CM

## 2023-08-16 DIAGNOSIS — K75.81 NASH (NONALCOHOLIC STEATOHEPATITIS): ICD-10-CM

## 2023-08-16 DIAGNOSIS — E03.9 HYPOTHYROIDISM, UNSPECIFIED TYPE: ICD-10-CM

## 2023-08-16 LAB
ALBUMIN SERPL-MCNC: 4.1 G/DL (ref 3.4–5)
ALBUMIN/GLOB SERPL: 1.6 {RATIO} (ref 1.1–2.2)
ALP SERPL-CCNC: 78 U/L (ref 40–129)
ALT SERPL-CCNC: 32 U/L (ref 10–40)
ANION GAP SERPL CALCULATED.3IONS-SCNC: 9 MMOL/L (ref 3–16)
AST SERPL-CCNC: 26 U/L (ref 15–37)
BILIRUB SERPL-MCNC: <0.2 MG/DL (ref 0–1)
BUN SERPL-MCNC: 31 MG/DL (ref 7–20)
CALCIUM SERPL-MCNC: 9.8 MG/DL (ref 8.3–10.6)
CHLORIDE SERPL-SCNC: 100 MMOL/L (ref 99–110)
CHOLEST SERPL-MCNC: 164 MG/DL (ref 0–199)
CO2 SERPL-SCNC: 31 MMOL/L (ref 21–32)
CREAT SERPL-MCNC: 1.2 MG/DL (ref 0.6–1.2)
GFR SERPLBLD CREATININE-BSD FMLA CKD-EPI: 49 ML/MIN/{1.73_M2}
GLUCOSE SERPL-MCNC: 223 MG/DL (ref 70–99)
HDLC SERPL-MCNC: 49 MG/DL (ref 40–60)
LDLC SERPL CALC-MCNC: ABNORMAL MG/DL
LDLC SERPL-MCNC: 60 MG/DL
POTASSIUM SERPL-SCNC: 5.4 MMOL/L (ref 3.5–5.1)
PROT SERPL-MCNC: 6.6 G/DL (ref 6.4–8.2)
SODIUM SERPL-SCNC: 140 MMOL/L (ref 136–145)
TRIGL SERPL-MCNC: 345 MG/DL (ref 0–150)
VLDLC SERPL CALC-MCNC: ABNORMAL MG/DL

## 2023-08-16 RX ORDER — TETRAHYDROZOLINE HCL 0.05 %
1 DROPS OPHTHALMIC (EYE)
COMMUNITY

## 2023-08-16 SDOH — ECONOMIC STABILITY: HOUSING INSECURITY
IN THE LAST 12 MONTHS, WAS THERE A TIME WHEN YOU DID NOT HAVE A STEADY PLACE TO SLEEP OR SLEPT IN A SHELTER (INCLUDING NOW)?: NO

## 2023-08-16 SDOH — ECONOMIC STABILITY: INCOME INSECURITY: HOW HARD IS IT FOR YOU TO PAY FOR THE VERY BASICS LIKE FOOD, HOUSING, MEDICAL CARE, AND HEATING?: NOT HARD AT ALL

## 2023-08-16 SDOH — ECONOMIC STABILITY: FOOD INSECURITY: WITHIN THE PAST 12 MONTHS, THE FOOD YOU BOUGHT JUST DIDN'T LAST AND YOU DIDN'T HAVE MONEY TO GET MORE.: NEVER TRUE

## 2023-08-16 SDOH — ECONOMIC STABILITY: FOOD INSECURITY: WITHIN THE PAST 12 MONTHS, YOU WORRIED THAT YOUR FOOD WOULD RUN OUT BEFORE YOU GOT MONEY TO BUY MORE.: NEVER TRUE

## 2023-08-16 NOTE — PROGRESS NOTES
Alejo Goldman presents for   Chief Complaint   Patient presents with    Hypertension    Hypothyroidism    Diabetes     Pt is here for 6 month follow up         ASSESSMENT:   Diagnosis Orders   1. Essential hypertension, controlled continue valsartan 80 mg, amlodipine 5 mg, carvedilol 25 mg twice a day and HCTZ 25 mg a day Comprehensive Metabolic Panel      2. Type 2 diabetes mellitus with diabetic nephropathy, with long-term current use of insulin ([address]), follow-up with endocrinology       3. Severe obesity (BMI 35.0-39. 9) with comorbidity ([address]), BMI 35 continue lifestyle efforts       4. Stage 3b chronic kidney disease ([address]), labs are pending last GFR was 55 we will reassess       5. Mixed hyperlipidemia, plan per cardiology Repatha and fenofibrate Lipid Panel      6. RASHEED (nonalcoholic steatohepatitis), patient reminded she needs to follow-up with GI       7. Coronary artery disease involving native coronary artery of native heart without angina pectoris continue 81 mg aspirin and cardiology follow-up       8. Hypothyroidism, unspecified type, managed by endocrinology            Plan:  1)  Medication: continue current medication regimen unchanged, medications are working and tolerated, continue as listed  2)  Recheck in 6 months, sooner should new symptoms or problems arise. 3) LLR          SUBJECTIVE:  Alejo Goldman is a 76 y.o. female who presents for evaluation of CAD, CKD, diabetes, Sheran Bear, hypothyroidism, hypertension and hyperlipidemia. She indicates that she is feeling well and denies any symptoms referable to her elevated blood pressure. Specifically denies chest pain, palpitations, dyspnea, orthopnea, PND or peripheral edema or neuro sx. No anorexia, arthralgia, or leg cramps noted. Current medication regimen is as listed below. She denies any side effects of medication, and has been taking it regularly.    Lab Results   Component Value Date    LDLCALC 62 02/15/2023    LDLDIRECT 191 (H) 05/20/2022

## 2023-08-21 DIAGNOSIS — E87.5 SERUM POTASSIUM ELEVATED: Primary | ICD-10-CM

## 2023-09-01 ENCOUNTER — NURSE ONLY (OUTPATIENT)
Dept: FAMILY MEDICINE CLINIC | Age: 68
End: 2023-09-01
Payer: MEDICARE

## 2023-09-01 DIAGNOSIS — E87.5 SERUM POTASSIUM ELEVATED: ICD-10-CM

## 2023-09-01 PROCEDURE — 36415 COLL VENOUS BLD VENIPUNCTURE: CPT | Performed by: FAMILY MEDICINE

## 2023-09-02 LAB — POTASSIUM SERPL-SCNC: 4.8 MMOL/L (ref 3.5–5.1)

## 2023-09-05 RX ORDER — CARVEDILOL 25 MG/1
TABLET ORAL
Qty: 180 TABLET | Refills: 3 | Status: SHIPPED | OUTPATIENT
Start: 2023-09-05

## 2023-09-05 RX ORDER — AMLODIPINE BESYLATE 5 MG/1
5 TABLET ORAL DAILY
Qty: 90 TABLET | Refills: 3 | Status: SHIPPED | OUTPATIENT
Start: 2023-09-05

## 2023-09-24 DIAGNOSIS — I10 ESSENTIAL HYPERTENSION: ICD-10-CM

## 2023-09-25 RX ORDER — HYDROCHLOROTHIAZIDE 25 MG/1
25 TABLET ORAL DAILY
Qty: 90 TABLET | Refills: 1 | Status: SHIPPED | OUTPATIENT
Start: 2023-09-25

## 2023-09-25 NOTE — TELEPHONE ENCOUNTER
Bill Martinez is requesting refill(s) HCTZ  Last OV 8/16/23 (pertaining to medication)  LR 3/6/23 (per medication requested)  Next office visit scheduled or attempted Yes   If no, reason:  2/20/24

## 2023-10-25 LAB
CREATININE URINE: 117.3 MG/DL
MICROALBUMIN/CREAT 24H UR: 50.8 MG/DL
MICROALBUMIN/CREAT UR-RTO: 433 MG/G

## 2023-10-30 ENCOUNTER — HOSPITAL ENCOUNTER (OUTPATIENT)
Dept: WOMENS IMAGING | Age: 68
Discharge: HOME | End: 2023-10-30
Payer: MEDICARE

## 2023-10-30 VITALS — BODY MASS INDEX: 34.41 KG/M2 | HEIGHT: 62 IN | WEIGHT: 187 LBS

## 2023-10-30 DIAGNOSIS — Z12.31 ENCOUNTER FOR SCREENING MAMMOGRAM FOR MALIGNANT NEOPLASM OF BREAST: ICD-10-CM

## 2023-10-30 PROCEDURE — 77063 BREAST TOMOSYNTHESIS BI: CPT

## 2024-02-07 LAB
ESTIMATED AVERAGE GLUCOSE: NORMAL
HBA1C MFR BLD: 8.3 %
TSH SERPL DL<=0.05 MIU/L-ACNC: 5.24 UIU/ML

## 2024-02-20 ENCOUNTER — OFFICE VISIT (OUTPATIENT)
Dept: FAMILY MEDICINE CLINIC | Age: 69
End: 2024-02-20
Payer: MEDICARE

## 2024-02-20 VITALS
BODY MASS INDEX: 35.7 KG/M2 | SYSTOLIC BLOOD PRESSURE: 138 MMHG | OXYGEN SATURATION: 96 % | DIASTOLIC BLOOD PRESSURE: 70 MMHG | HEART RATE: 93 BPM | WEIGHT: 194 LBS | HEIGHT: 62 IN

## 2024-02-20 DIAGNOSIS — E66.01 SEVERE OBESITY (BMI 35.0-39.9) WITH COMORBIDITY (HCC): ICD-10-CM

## 2024-02-20 DIAGNOSIS — E11.21 TYPE 2 DIABETES MELLITUS WITH DIABETIC NEPHROPATHY, WITH LONG-TERM CURRENT USE OF INSULIN (HCC): ICD-10-CM

## 2024-02-20 DIAGNOSIS — I10 ESSENTIAL HYPERTENSION: Primary | ICD-10-CM

## 2024-02-20 DIAGNOSIS — E78.2 MIXED HYPERLIPIDEMIA: ICD-10-CM

## 2024-02-20 DIAGNOSIS — N18.32 STAGE 3B CHRONIC KIDNEY DISEASE (HCC): ICD-10-CM

## 2024-02-20 DIAGNOSIS — E03.9 HYPOTHYROIDISM, UNSPECIFIED TYPE: ICD-10-CM

## 2024-02-20 DIAGNOSIS — K75.81 NASH (NONALCOHOLIC STEATOHEPATITIS): ICD-10-CM

## 2024-02-20 DIAGNOSIS — I25.10 CORONARY ARTERY DISEASE INVOLVING NATIVE CORONARY ARTERY OF NATIVE HEART WITHOUT ANGINA PECTORIS: ICD-10-CM

## 2024-02-20 DIAGNOSIS — Z79.4 TYPE 2 DIABETES MELLITUS WITH DIABETIC NEPHROPATHY, WITH LONG-TERM CURRENT USE OF INSULIN (HCC): ICD-10-CM

## 2024-02-20 LAB
ALBUMIN SERPL-MCNC: 3.7 G/DL (ref 3.4–5)
ALBUMIN/GLOB SERPL: 1.2 {RATIO} (ref 1.1–2.2)
ALP SERPL-CCNC: 79 U/L (ref 40–129)
ALT SERPL-CCNC: 23 U/L (ref 10–40)
ANION GAP SERPL CALCULATED.3IONS-SCNC: 10 MMOL/L (ref 3–16)
AST SERPL-CCNC: 23 U/L (ref 15–37)
BILIRUB SERPL-MCNC: <0.2 MG/DL (ref 0–1)
BUN SERPL-MCNC: 25 MG/DL (ref 7–20)
CALCIUM SERPL-MCNC: 8.9 MG/DL (ref 8.3–10.6)
CHLORIDE SERPL-SCNC: 99 MMOL/L (ref 99–110)
CO2 SERPL-SCNC: 28 MMOL/L (ref 21–32)
CREAT SERPL-MCNC: 1.5 MG/DL (ref 0.6–1.2)
GFR SERPLBLD CREATININE-BSD FMLA CKD-EPI: 38 ML/MIN/{1.73_M2}
GLUCOSE SERPL-MCNC: 203 MG/DL (ref 70–99)
POTASSIUM SERPL-SCNC: 4.4 MMOL/L (ref 3.5–5.1)
PROT SERPL-MCNC: 6.7 G/DL (ref 6.4–8.2)
SODIUM SERPL-SCNC: 137 MMOL/L (ref 136–145)

## 2024-02-20 PROCEDURE — 99215 OFFICE O/P EST HI 40 MIN: CPT | Performed by: FAMILY MEDICINE

## 2024-02-20 PROCEDURE — 36415 COLL VENOUS BLD VENIPUNCTURE: CPT | Performed by: FAMILY MEDICINE

## 2024-02-20 PROCEDURE — 1123F ACP DISCUSS/DSCN MKR DOCD: CPT | Performed by: FAMILY MEDICINE

## 2024-02-20 PROCEDURE — 3075F SYST BP GE 130 - 139MM HG: CPT | Performed by: FAMILY MEDICINE

## 2024-02-20 PROCEDURE — 3078F DIAST BP <80 MM HG: CPT | Performed by: FAMILY MEDICINE

## 2024-02-20 RX ORDER — ICOSAPENT ETHYL 1000 MG/1
2 CAPSULE ORAL 2 TIMES DAILY WITH MEALS
COMMUNITY
Start: 2023-11-17 | End: 2024-02-20

## 2024-02-20 ASSESSMENT — PATIENT HEALTH QUESTIONNAIRE - PHQ9
10. IF YOU CHECKED OFF ANY PROBLEMS, HOW DIFFICULT HAVE THESE PROBLEMS MADE IT FOR YOU TO DO YOUR WORK, TAKE CARE OF THINGS AT HOME, OR GET ALONG WITH OTHER PEOPLE: 0
SUM OF ALL RESPONSES TO PHQ9 QUESTIONS 1 & 2: 0
2. FEELING DOWN, DEPRESSED OR HOPELESS: 0
3. TROUBLE FALLING OR STAYING ASLEEP: 0
4. FEELING TIRED OR HAVING LITTLE ENERGY: 0
8. MOVING OR SPEAKING SO SLOWLY THAT OTHER PEOPLE COULD HAVE NOTICED. OR THE OPPOSITE, BEING SO FIGETY OR RESTLESS THAT YOU HAVE BEEN MOVING AROUND A LOT MORE THAN USUAL: 0
1. LITTLE INTEREST OR PLEASURE IN DOING THINGS: 0
9. THOUGHTS THAT YOU WOULD BE BETTER OFF DEAD, OR OF HURTING YOURSELF: 0
7. TROUBLE CONCENTRATING ON THINGS, SUCH AS READING THE NEWSPAPER OR WATCHING TELEVISION: 0
5. POOR APPETITE OR OVEREATING: 0
SUM OF ALL RESPONSES TO PHQ QUESTIONS 1-9: 0
6. FEELING BAD ABOUT YOURSELF - OR THAT YOU ARE A FAILURE OR HAVE LET YOURSELF OR YOUR FAMILY DOWN: 0

## 2024-02-20 NOTE — PROGRESS NOTES
hypertension and hyperlipidemia. She indicates that she is feeling well and denies any symptoms referable to her elevated blood pressure.   Specifically denies chest pain, palpitations, dyspnea, orthopnea, PND or peripheral edema or neuro sx.  No anorexia, arthralgia, or leg cramps noted. Current medication regimen is as listed below. She denies any side effects of medication, and has been taking it regularly.   Lab Results   Component Value Date    LDLCALC see below 08/16/2023    LDLDIRECT 60 08/16/2023       Patient is a very complicated medical history.  She sees multiple specialists.  She recently saw endocrinology that note was reviewed.  Her A1c remains above 8.  They continue to adjust medication.  Currently she is on Trulicity 4.5 mg weekly, Humalog and Tresiba insulins Farxiga 10 mg and metformin 500 mg twice a day.  Patient does have CKD.  Farxiga 10 mg helps with that, valsartan will help with that.  Her last GFR was 49.  We will reassess today.  Patient has a history of Berry.  She has had elevated liver functions in the 100s in 2020.  After stopping the statins her numbers did improve.  She has not seen GI since 2022.  She was asked to schedule with them 6 months ago and has not done so yet.  Again she was given the phone number to call to schedule that appointment.  Patient's BMI continues to be above 35.  She will continue to work on lifestyle efforts.  Patient has hypothyroidism and this is managed by endocrinology as well.  Patient sees cardiology for management of her hyperlipidemia, hypertension and CAD.  Patient states that she stopped taking Repatha 2 months ago because it was too expensive.  She did not let cardiology know.  So she has not been taking any medicine other than Tricor for hyperlipidemia.  Her blood pressure is good today.  She is on valsartan 80 mg, amlodipine 5 mg, carvedilol 25 twice a day and HCTZ 25 mg daily.    Current Outpatient Medications   Medication Sig Dispense Refill

## 2024-02-21 DIAGNOSIS — E11.21 TYPE 2 DIABETES MELLITUS WITH DIABETIC NEPHROPATHY, WITH LONG-TERM CURRENT USE OF INSULIN (HCC): ICD-10-CM

## 2024-02-21 DIAGNOSIS — N18.32 STAGE 3B CHRONIC KIDNEY DISEASE (HCC): Primary | ICD-10-CM

## 2024-02-21 DIAGNOSIS — Z79.4 TYPE 2 DIABETES MELLITUS WITH DIABETIC NEPHROPATHY, WITH LONG-TERM CURRENT USE OF INSULIN (HCC): ICD-10-CM

## 2024-02-29 ENCOUNTER — TELEPHONE (OUTPATIENT)
Dept: FAMILY MEDICINE CLINIC | Age: 69
End: 2024-02-29

## 2024-02-29 NOTE — TELEPHONE ENCOUNTER
Patient returned call to say that she got the messages from our office and has an appointment with Dr Grant on May 17th.

## 2024-03-05 DIAGNOSIS — I10 ESSENTIAL HYPERTENSION: ICD-10-CM

## 2024-03-05 RX ORDER — HYDROCHLOROTHIAZIDE 25 MG/1
25 TABLET ORAL DAILY
Qty: 90 TABLET | Refills: 1 | Status: SHIPPED | OUTPATIENT
Start: 2024-03-05

## 2024-03-05 NOTE — TELEPHONE ENCOUNTER
Clemencia Perez is requesting refill(s) hctz  Last OV 2/20/24 (pertaining to medication)  LR 9/25/23 (per medication requested)  Next office visit scheduled or attempted Yes   If no, reason:  8/20/24

## 2024-03-26 LAB — DIABETIC RETINOPATHY: NEGATIVE

## 2024-05-08 LAB
ESTIMATED AVERAGE GLUCOSE: NORMAL
HBA1C MFR BLD: 8.3 %

## 2024-06-01 ENCOUNTER — HOSPITAL ENCOUNTER (OUTPATIENT)
Age: 69
Setting detail: SPECIMEN
Discharge: HOME | End: 2024-06-01
Payer: MEDICARE

## 2024-06-01 ENCOUNTER — HOSPITAL ENCOUNTER (OUTPATIENT)
Dept: ULTRASOUND IMAGING | Age: 69
Discharge: HOME | End: 2024-06-01
Attending: INTERNAL MEDICINE
Payer: MEDICARE

## 2024-06-01 DIAGNOSIS — N18.32 CHRONIC KIDNEY DISEASE (CKD) STAGE G3B/A1, MODERATELY DECREASED GLOMERULAR FILTRATION RATE (GFR) BETWEEN 30-44 ML/MIN/1.73 SQUARE METER AND ALBUMINURIA CREATININE RATIO LESS THAN 30 MG/G (HCC): ICD-10-CM

## 2024-06-01 LAB
25(OH)D3 SERPL-MCNC: 57.4 NG/ML
ALBUMIN SERPL-MCNC: 3.8 G/DL (ref 3.4–5)
ANION GAP SERPL CALCULATED.3IONS-SCNC: 8 MMOL/L (ref 3–16)
BUN SERPL-MCNC: 26 MG/DL (ref 7–20)
C3 SERPL-MCNC: 146.1 MG/DL (ref 90–180)
C4 SERPL-MCNC: 26.9 MG/DL (ref 10–40)
CALCIUM SERPL-MCNC: 9.6 MG/DL (ref 8.3–10.6)
CHLORIDE SERPL-SCNC: 99 MMOL/L (ref 99–110)
CO2 SERPL-SCNC: 29 MMOL/L (ref 21–32)
CREAT SERPL-MCNC: 1.2 MG/DL (ref 0.6–1.2)
CREAT UR-MCNC: 79.1 MG/DL (ref 28–259)
GFR SERPLBLD CREATININE-BSD FMLA CKD-EPI: 49 ML/MIN/{1.73_M2}
GLUCOSE SERPL-MCNC: 162 MG/DL (ref 70–99)
PHOSPHATE SERPL-MCNC: 3.5 MG/DL (ref 2.5–4.9)
POTASSIUM SERPL-SCNC: 4.9 MMOL/L (ref 3.5–5.1)
PROT SERPL-MCNC: 7.1 G/DL (ref 6.4–8.2)
PROT UR-MCNC: 67 MG/DL
PROT/CREAT UR-RTO: 0.8 MG/DL
PTH-INTACT SERPL-MCNC: 49.5 PG/ML (ref 14–72)
RPT COMMENT: NORMAL
SODIUM SERPL-SCNC: 136 MMOL/L (ref 136–145)
T4 FREE SERPL-MCNC: 1.3 NG/DL (ref 0.9–1.8)
TSH SERPL DL<=0.005 MIU/L-ACNC: 4.2 UIU/ML (ref 0.27–4.2)

## 2024-06-01 PROCEDURE — 86039 ANTINUCLEAR ANTIBODIES (ANA): CPT

## 2024-06-01 PROCEDURE — 83521 IG LIGHT CHAINS FREE EACH: CPT

## 2024-06-01 PROCEDURE — 84165 PROTEIN E-PHORESIS SERUM: CPT

## 2024-06-01 PROCEDURE — 36415 COLL VENOUS BLD VENIPUNCTURE: CPT

## 2024-06-01 PROCEDURE — 76770 US EXAM ABDO BACK WALL COMP: CPT

## 2024-06-01 PROCEDURE — 86160 COMPLEMENT ANTIGEN: CPT

## 2024-06-01 PROCEDURE — 84443 ASSAY THYROID STIM HORMONE: CPT

## 2024-06-01 PROCEDURE — 84439 ASSAY OF FREE THYROXINE: CPT

## 2024-06-01 PROCEDURE — 80069 RENAL FUNCTION PANEL: CPT

## 2024-06-01 PROCEDURE — 82306 VITAMIN D 25 HYDROXY: CPT

## 2024-06-01 PROCEDURE — 82570 ASSAY OF URINE CREATININE: CPT

## 2024-06-01 PROCEDURE — 84155 ASSAY OF PROTEIN SERUM: CPT

## 2024-06-01 PROCEDURE — 86038 ANTINUCLEAR ANTIBODIES: CPT

## 2024-06-01 PROCEDURE — 83970 ASSAY OF PARATHORMONE: CPT

## 2024-06-01 PROCEDURE — 84156 ASSAY OF PROTEIN URINE: CPT

## 2024-06-02 LAB — ANA SER QL IA: POSITIVE

## 2024-06-03 LAB
ALBUMIN SERPL ELPH-MCNC: 3.1 G/DL (ref 3.1–4.9)
ALPHA1 GLOB SERPL ELPH-MCNC: 0.2 G/DL (ref 0.2–0.4)
ALPHA2 GLOB SERPL ELPH-MCNC: 1.2 G/DL (ref 0.4–1.1)
B-GLOBULIN SERPL ELPH-MCNC: 1.3 G/DL (ref 0.9–1.6)
GAMMA GLOB SERPL ELPH-MCNC: 1.3 G/DL (ref 0.6–1.8)
PROT SERPL-MCNC: 7.1 G/DL (ref 6.4–8.2)
SPE/IFE INTERPRETATION: NORMAL

## 2024-06-04 LAB
KAPPA LC FREE SER-MCNC: 49.79 MG/L (ref 3.3–19.4)
KAPPA LC FREE/LAMBDA FREE SER: 0.92 {RATIO} (ref 0.26–1.65)
LAMBDA LC FREE SERPL-MCNC: 54.36 MG/L (ref 5.71–26.3)
RPT COMMENT: ABNORMAL

## 2024-06-05 LAB
ANTINUCLEAR AB INTERPRETIVE COMMENT: NORMAL
CYTOPLASMIC AB PATTERN SER IF-IMP: ABNORMAL
CYTOPLASMIC PATTERN TITER: ABNORMAL
NUCLEAR IGG SER QL IF: NORMAL

## 2024-06-06 ENCOUNTER — TELEPHONE (OUTPATIENT)
Dept: FAMILY MEDICINE CLINIC | Age: 69
End: 2024-06-06

## 2024-06-11 ENCOUNTER — TELEMEDICINE (OUTPATIENT)
Dept: FAMILY MEDICINE CLINIC | Age: 69
End: 2024-06-11
Payer: MEDICARE

## 2024-06-11 DIAGNOSIS — Z00.00 INITIAL MEDICARE ANNUAL WELLNESS VISIT: Primary | ICD-10-CM

## 2024-06-11 PROCEDURE — 1123F ACP DISCUSS/DSCN MKR DOCD: CPT | Performed by: FAMILY MEDICINE

## 2024-06-11 PROCEDURE — G0438 PPPS, INITIAL VISIT: HCPCS | Performed by: FAMILY MEDICINE

## 2024-06-11 ASSESSMENT — PATIENT HEALTH QUESTIONNAIRE - PHQ9
SUM OF ALL RESPONSES TO PHQ QUESTIONS 1-9: 0
1. LITTLE INTEREST OR PLEASURE IN DOING THINGS: NOT AT ALL
SUM OF ALL RESPONSES TO PHQ QUESTIONS 1-9: 0
2. FEELING DOWN, DEPRESSED OR HOPELESS: NOT AT ALL
SUM OF ALL RESPONSES TO PHQ QUESTIONS 1-9: 0
SUM OF ALL RESPONSES TO PHQ9 QUESTIONS 1 & 2: 0
SUM OF ALL RESPONSES TO PHQ QUESTIONS 1-9: 0

## 2024-06-11 ASSESSMENT — LIFESTYLE VARIABLES
HOW MANY STANDARD DRINKS CONTAINING ALCOHOL DO YOU HAVE ON A TYPICAL DAY: 1 OR 2
HOW OFTEN DO YOU HAVE A DRINK CONTAINING ALCOHOL: 2-4 TIMES A MONTH

## 2024-06-11 NOTE — PROGRESS NOTES
mmHg  Patient-Reported Weight: 189lb  Patient-Reported Height: 5' 2\"              Allergies   Allergen Reactions    Lisinopril     Proventil [Albuterol Sulfate] Other (See Comments)     Paralyzed vocal cords    Statins      Elevated liver enzymes    Tetanus Toxoids      Prior to Visit Medications    Medication Sig Taking? Authorizing Provider   hydroCHLOROthiazide (HYDRODIURIL) 25 MG tablet TAKE 1 TABLET BY MOUTH DAILY Yes Zandra Perez PA   amLODIPine (NORVASC) 5 MG tablet TAKE 1 TABLET BY MOUTH DAILY Yes Bob Poe MD   carvedilol (COREG) 25 MG tablet TAKE 1 TABLET BY MOUTH TWICE DAILY Yes Bob Poe MD   valsartan (DIOVAN) 80 MG tablet Take 1 tablet by mouth at bedtime Yes Amando Magallon MD   dapagliflozin (FARXIGA) 10 MG tablet Take 1 tablet by mouth every morning Yes Giovanny Mckinley MD   metFORMIN (GLUCOPHAGE) 500 MG tablet Take 1 tablet by mouth 2 times daily (with meals) Yes Giovanny Mckinley MD   Dulaglutide (TRULICITY) 4.5 MG/0.5ML SOPN Inject 4.5 mg into the skin once a week Yes Giovanny Mckinley MD   fenofibrate (TRICOR) 48 MG tablet TAKE 1 TABLET BY MOUTH DAILY Yes Raquel Dodson APRN - CNP   levothyroxine (SYNTHROID) 75 MCG tablet Take 1 tablet by mouth Daily Yes Raquel Dodson APRN - CNP   HUMALOG KWIKPEN 100 UNIT/ML SOPN ADMINISTER 5 TO 20 UNITS UNDER THE SKIN THREE TIMES DAILY BEFORE MEALS Yes Zofia Medeiros MD   Insulin Degludec (TRESIBA FLEXTOUCH) 200 UNIT/ML SOPN INJECT 36 UNITS UNDER THE SKIN NIGHTLY Yes Raquel Dodson APRN - CNP   Selenium (SELENIMIN PO) Take by mouth Yes Giovanny Mckinley MD   Cyanocobalamin (B-12) 100 MCG TABS  Yes Giovanny Mckinley MD   vitamin D (CHOLECALCIFEROL) 1000 UNIT TABS tablet Take 1 tablet by mouth daily Yes Giovanny Mckinley MD   Multiple Vitamins-Minerals (MULTIVITAMIN WOMEN) TABS Take by mouth daily Yes Giovanny Mckinley MD   Magnesium 400 MG TABS Take by mouth nightly Yes Giovanny Mckinley MD   CINNAMON PO Take

## 2024-08-04 DIAGNOSIS — I10 PRIMARY HYPERTENSION: ICD-10-CM

## 2024-08-06 DIAGNOSIS — I10 PRIMARY HYPERTENSION: ICD-10-CM

## 2024-08-06 RX ORDER — VALSARTAN 80 MG/1
80 TABLET ORAL NIGHTLY
Qty: 30 TABLET | Refills: 0 | Status: SHIPPED | OUTPATIENT
Start: 2024-08-06 | End: 2024-08-07

## 2024-08-07 RX ORDER — VALSARTAN 80 MG/1
80 TABLET ORAL NIGHTLY
Qty: 30 TABLET | Refills: 0 | Status: SHIPPED | OUTPATIENT
Start: 2024-08-07

## 2024-08-12 NOTE — PROGRESS NOTES
Auscultation: Normal breath sounds without dullness  Cardiovascular:  The apical impulses not displaced  Heart tones are crisp and normal  Cervical veins are not engorged  The carotid upstroke is normal in amplitude and contour without delay or bruit  Normal S1S2, No S3, No Murmur  Peripheral pulses are symmetrical and full  There is no clubbing, cyanosis of the extremities.  No edema  Femoral Arteries: 2+ and equal  Pedal Pulses: 2+ and equal   Abdomen:  No masses or tenderness  Liver/Spleen: No Abnormalities Noted  Neurological/Psychiatric:  Alert and oriented in all spheres  Moves all extremities well  Exhibits normal gait balance and coordination  No abnormalities of mood, affect, memory, mentation, or behavior are noted      Renal artery duplex 9/12/17  1. Technically limited exam due to depth and bowel gas.  2. Within the limits of this exam, there is no evidence to suggest renal  artery stenosis bilaterally.  3. Resistive indexes in the bilateral upper and lower poles are within  normal limits      Echocardiogram 9/17/17  Normal left ventricle systolic function with an estimated ejection fraction  of 55%.  No regional wall motion abnormalities are seen.  Normal left ventricular diastolic filling pressure.  No significant valvular heart disease.  Systolic pulmonary artery pressure (SPAP) is normal and estimated at 24 mmHg (RA pressure 3 mmHg).    CT calcium score 5/16/2022  Total calcium score of 297 is within the 80th percentile for the patient's age of 67 y/o .        Echo 9/14/2022  Summary   -- Normal left ventricle systolic function with an estimated ejection   fraction of 55%. No regional wall motion abnormalities are seen. Normal left   ventricular diastolic filling pressure.   Trace mitral and tricuspid regurgitation.   Inadequate tricuspid regurgitation to estimate systolic pulmonary artery   pressure.    Echo exercise stress test 9/14/2022  Summary   Baseline EKG shows normal sinus rhythm.

## 2024-08-13 ENCOUNTER — OFFICE VISIT (OUTPATIENT)
Dept: CARDIOLOGY CLINIC | Age: 69
End: 2024-08-13
Payer: MEDICARE

## 2024-08-13 VITALS
WEIGHT: 192.5 LBS | HEIGHT: 62 IN | DIASTOLIC BLOOD PRESSURE: 72 MMHG | BODY MASS INDEX: 35.43 KG/M2 | SYSTOLIC BLOOD PRESSURE: 148 MMHG | HEART RATE: 78 BPM | OXYGEN SATURATION: 98 %

## 2024-08-13 DIAGNOSIS — E78.2 MIXED HYPERLIPIDEMIA: ICD-10-CM

## 2024-08-13 DIAGNOSIS — I25.10 CORONARY ARTERY DISEASE INVOLVING NATIVE CORONARY ARTERY OF NATIVE HEART WITHOUT ANGINA PECTORIS: Primary | ICD-10-CM

## 2024-08-13 DIAGNOSIS — I10 PRIMARY HYPERTENSION: ICD-10-CM

## 2024-08-13 PROCEDURE — 3078F DIAST BP <80 MM HG: CPT | Performed by: INTERNAL MEDICINE

## 2024-08-13 PROCEDURE — 99214 OFFICE O/P EST MOD 30 MIN: CPT | Performed by: INTERNAL MEDICINE

## 2024-08-13 PROCEDURE — 3077F SYST BP >= 140 MM HG: CPT | Performed by: INTERNAL MEDICINE

## 2024-08-13 PROCEDURE — 1123F ACP DISCUSS/DSCN MKR DOCD: CPT | Performed by: INTERNAL MEDICINE

## 2024-08-13 RX ORDER — VALSARTAN 80 MG/1
80 TABLET ORAL 2 TIMES DAILY
Qty: 180 TABLET | Refills: 3 | Status: SHIPPED | OUTPATIENT
Start: 2024-08-13

## 2024-08-13 NOTE — PATIENT INSTRUCTIONS
Plan:  ~Increase Valsartan to 80 mg twice a day   ~Labs when you see Zofia Medeiros MD- Fasting lipids and CMP    Cardiac medications reviewed including indications and pertinent side effects. Medication list updated at this visit.   Patient verbalizes understanding of the need for treatment and education has been provided at today's visit. Additional education material will be provided in after visit summary.    Check blood pressure and heart rate at home a few times per week- keep a log with dates and times and bring to office visit    ~Blood pressure goal is 120-130/70-80's, rarely over 140/90  Regular exercise and following a healthy diet encouraged   Follow up with me in 1 year

## 2024-08-20 ENCOUNTER — OFFICE VISIT (OUTPATIENT)
Dept: FAMILY MEDICINE CLINIC | Age: 69
End: 2024-08-20
Payer: MEDICARE

## 2024-08-20 ENCOUNTER — HOSPITAL ENCOUNTER (OUTPATIENT)
Age: 69
Discharge: HOME | End: 2024-08-20
Payer: MEDICARE

## 2024-08-20 VITALS
SYSTOLIC BLOOD PRESSURE: 142 MMHG | HEART RATE: 69 BPM | HEIGHT: 62 IN | BODY MASS INDEX: 35.59 KG/M2 | WEIGHT: 193.4 LBS | OXYGEN SATURATION: 98 % | DIASTOLIC BLOOD PRESSURE: 68 MMHG

## 2024-08-20 DIAGNOSIS — Z79.85 LONG-TERM CURRENT USE OF INJECTABLE NONINSULIN ANTIDIABETIC MEDICATION: ICD-10-CM

## 2024-08-20 DIAGNOSIS — Z79.4 TYPE 2 DIABETES MELLITUS WITH DIABETIC NEPHROPATHY, WITH LONG-TERM CURRENT USE OF INSULIN (HCC): ICD-10-CM

## 2024-08-20 DIAGNOSIS — I10 ESSENTIAL HYPERTENSION: Primary | ICD-10-CM

## 2024-08-20 DIAGNOSIS — E66.01 SEVERE OBESITY (BMI 35.0-39.9) WITH COMORBIDITY (HCC): ICD-10-CM

## 2024-08-20 DIAGNOSIS — N18.32 STAGE 3B CHRONIC KIDNEY DISEASE (HCC): ICD-10-CM

## 2024-08-20 DIAGNOSIS — K75.81 NASH (NONALCOHOLIC STEATOHEPATITIS): ICD-10-CM

## 2024-08-20 DIAGNOSIS — E03.9 HYPOTHYROIDISM, UNSPECIFIED TYPE: ICD-10-CM

## 2024-08-20 DIAGNOSIS — E11.21 TYPE 2 DIABETES MELLITUS WITH DIABETIC NEPHROPATHY, WITH LONG-TERM CURRENT USE OF INSULIN (HCC): ICD-10-CM

## 2024-08-20 DIAGNOSIS — E78.2 MIXED HYPERLIPIDEMIA: ICD-10-CM

## 2024-08-20 DIAGNOSIS — I25.10 CORONARY ARTERY DISEASE INVOLVING NATIVE CORONARY ARTERY OF NATIVE HEART WITHOUT ANGINA PECTORIS: ICD-10-CM

## 2024-08-20 LAB
ALBUMIN SERPL-MCNC: 4.1 G/DL (ref 3.4–5)
ALBUMIN/GLOB SERPL: 1.2 {RATIO} (ref 1.1–2.2)
ALP SERPL-CCNC: 73 U/L (ref 40–129)
ALT SERPL-CCNC: 28 U/L (ref 10–40)
ANION GAP SERPL CALCULATED.3IONS-SCNC: 11 MMOL/L (ref 3–16)
AST SERPL-CCNC: 32 U/L (ref 15–37)
BILIRUB SERPL-MCNC: 0.3 MG/DL (ref 0–1)
BUN SERPL-MCNC: 33 MG/DL (ref 7–20)
CALCIUM SERPL-MCNC: 9.5 MG/DL (ref 8.3–10.6)
CHLORIDE SERPL-SCNC: 100 MMOL/L (ref 99–110)
CHOLEST SERPL-MCNC: 295 MG/DL (ref 0–199)
CO2 SERPL-SCNC: 27 MMOL/L (ref 21–32)
CREAT SERPL-MCNC: 1.5 MG/DL (ref 0.6–1.2)
GFR SERPLBLD CREATININE-BSD FMLA CKD-EPI: 37 ML/MIN/{1.73_M2}
GLUCOSE SERPL-MCNC: 129 MG/DL (ref 70–99)
HDLC SERPL-MCNC: 38 MG/DL (ref 40–60)
LDLC SERPL CALC-MCNC: ABNORMAL MG/DL
LDLC SERPL-MCNC: 166 MG/DL
POTASSIUM SERPL-SCNC: 4.8 MMOL/L (ref 3.5–5.1)
PROT SERPL-MCNC: 7.4 G/DL (ref 6.4–8.2)
SODIUM SERPL-SCNC: 138 MMOL/L (ref 136–145)
TRIGL SERPL-MCNC: 390 MG/DL (ref 0–150)
VLDLC SERPL CALC-MCNC: ABNORMAL MG/DL

## 2024-08-20 PROCEDURE — 3052F HG A1C>EQUAL 8.0%<EQUAL 9.0%: CPT | Performed by: FAMILY MEDICINE

## 2024-08-20 PROCEDURE — 1123F ACP DISCUSS/DSCN MKR DOCD: CPT | Performed by: FAMILY MEDICINE

## 2024-08-20 PROCEDURE — 80053 COMPREHEN METABOLIC PANEL: CPT

## 2024-08-20 PROCEDURE — 99214 OFFICE O/P EST MOD 30 MIN: CPT | Performed by: FAMILY MEDICINE

## 2024-08-20 PROCEDURE — 3078F DIAST BP <80 MM HG: CPT | Performed by: FAMILY MEDICINE

## 2024-08-20 PROCEDURE — 80061 LIPID PANEL: CPT

## 2024-08-20 PROCEDURE — 3077F SYST BP >= 140 MM HG: CPT | Performed by: FAMILY MEDICINE

## 2024-08-20 PROCEDURE — G2211 COMPLEX E/M VISIT ADD ON: HCPCS | Performed by: FAMILY MEDICINE

## 2024-08-20 PROCEDURE — 36415 COLL VENOUS BLD VENIPUNCTURE: CPT

## 2024-08-20 SDOH — ECONOMIC STABILITY: FOOD INSECURITY: WITHIN THE PAST 12 MONTHS, YOU WORRIED THAT YOUR FOOD WOULD RUN OUT BEFORE YOU GOT MONEY TO BUY MORE.: NEVER TRUE

## 2024-08-20 SDOH — ECONOMIC STABILITY: INCOME INSECURITY: HOW HARD IS IT FOR YOU TO PAY FOR THE VERY BASICS LIKE FOOD, HOUSING, MEDICAL CARE, AND HEATING?: NOT HARD AT ALL

## 2024-08-20 SDOH — ECONOMIC STABILITY: FOOD INSECURITY: WITHIN THE PAST 12 MONTHS, THE FOOD YOU BOUGHT JUST DIDN'T LAST AND YOU DIDN'T HAVE MONEY TO GET MORE.: NEVER TRUE

## 2024-08-20 NOTE — PROGRESS NOTES
Clemencia Perez presents for   Chief Complaint   Patient presents with    Hypertension     Pt states that she is here for a 6 month f/u, is fasting for bw    Hyperlipidemia        ASSESSMENT:   Diagnosis Orders   1. Essential hypertension, acceptable control, continue follow-up with cardiology.  Patient on valsartan 80 mg twice daily amlodipine 5 mg, carvedilol 25 twice daily and HCTZ 25 mg       2. Mixed hyperlipidemia, patient only taking Tricor, she stopped Repatha months ago.  Cardiology has blood work orders for patient to do       3. Coronary artery disease involving native coronary artery of native heart without angina pectoris, stable, continue cardiology follow-up       4. Type 2 diabetes mellitus with diabetic nephropathy, with long-term current use of insulin (HCC), needs improvement, continue endocrinology follow-up latest A1c was 8.3.  Continue Farxiga 10 mg, metformin 500 twice daily, Trulicity 4.5 mg weekly, Humalog and Tresiba insulins       5. Stage 3b chronic kidney disease (HCC), stable, GFR at 49 continue nephrology follow-up       6. Severe obesity (BMI 35.0-39.9) with comorbidity (HCC), unchanged BMI still at 35 continue lifestyle effort       7. RASHEED (nonalcoholic steatohepatitis), labs have been ordered, needs GI follow-up       8. Hypothyroidism, unspecified type, controlled, continue endocrinology follow-up       9. Long-term current use of injectable noninsulin antidiabetic medication, patient on maximum dose of Trulicity per endocrinology            Plan:  1)  Medication: continue current medication regimen unchanged, medications are working and tolerated, continue as listed  2)  Recheck in 6 months, sooner should new symptoms or problems arise.  3) LLR, reviewed          SUBJECTIVE:  Clemencia Perez is a 69 y.o. female who presents for evaluation of CAD, hyperlipidemia, hypertension, CKD, RASHEED, hypothyroidism, diabetes . She indicates that she is feeling well and denies any symptoms

## 2024-08-21 ENCOUNTER — TELEPHONE (OUTPATIENT)
Dept: CARDIOLOGY CLINIC | Age: 69
End: 2024-08-21

## 2024-08-21 NOTE — TELEPHONE ENCOUNTER
----- Message from Dr. Bob Poe MD sent at 8/20/2024  4:55 PM EDT -----  Labs reviewed following increasing valsartan hand.  Potassium is within normal limits, creatinine has risen as expected 1.5 from 1.2.  This is similar to 6 months ago.  Would recommend we continue medication and monitor tolerance.  Triglycerides are once again very elevated, LDL pending.  Further recommendations on lipid control to come following LDL result

## 2024-08-21 NOTE — TELEPHONE ENCOUNTER
[facility] [place] [place]     FYI- Pt returned call. Relayed WEST message. Pt V/U. Pt reports she has not been taking reaptha for a few months now because it was too expensive. She thinks she is taking another cholesterol medication. Upon review of her chart there is not another cholesterol medication on her med list. Pt is at work and is unsure of her exact medications.

## 2024-08-21 NOTE — TELEPHONE ENCOUNTER
Have her call in with accurate medications (particularly any lipid lowering med) once she reviews medications after return home from work today. We can update list at that time and I will review this and LDL which remains pending and have further recs.

## 2024-08-21 NOTE — RESULT ENCOUNTER NOTE
Pt returned call. Relayed RJM message. Pt V/U. Pt reports she has not been taking reaptha for a few months now because it was too expensive. She thinks she is taking another cholesterol medication. Upon review of her chart there is not another cholesterol medication on her med list. Pt is at work and is unsure of her exact medications.

## 2024-08-22 NOTE — RESULT ENCOUNTER NOTE
Left VM for patient to call back     Message per Dr. Poe-LDL 60 > 166. Sig increase off Repatha. If this was too expensive, should explore praluent as alternative. She is taking fenofibrate it appears - this is not enough. Statin intolerance per allergies. Praluent best next step.

## 2024-08-27 DIAGNOSIS — E78.2 MIXED HYPERLIPIDEMIA: ICD-10-CM

## 2024-08-27 DIAGNOSIS — I10 PRIMARY HYPERTENSION: ICD-10-CM

## 2024-08-27 DIAGNOSIS — I10 ESSENTIAL HYPERTENSION: ICD-10-CM

## 2024-08-27 DIAGNOSIS — I25.10 CORONARY ARTERY DISEASE INVOLVING NATIVE CORONARY ARTERY OF NATIVE HEART WITHOUT ANGINA PECTORIS: Primary | ICD-10-CM

## 2024-08-27 RX ORDER — HYDROCHLOROTHIAZIDE 25 MG/1
25 TABLET ORAL DAILY
Qty: 90 TABLET | Refills: 1 | Status: SHIPPED | OUTPATIENT
Start: 2024-08-27

## 2024-08-27 RX ORDER — CARVEDILOL 25 MG/1
TABLET ORAL
Qty: 180 TABLET | Refills: 3 | Status: SHIPPED | OUTPATIENT
Start: 2024-08-27

## 2024-08-27 RX ORDER — AMLODIPINE BESYLATE 5 MG/1
5 TABLET ORAL DAILY
Qty: 90 TABLET | Refills: 3 | Status: SHIPPED | OUTPATIENT
Start: 2024-08-27

## 2024-08-27 NOTE — TELEPHONE ENCOUNTER
[place] [facility] [place]: [place]2/[place] [facility] [place]    Labs- BMP/CBC n/a    CMP- 8/20/24    Labs ordered, will call pt at a better time to relay

## 2024-08-27 NOTE — TELEPHONE ENCOUNTER
Clemencia Perez is requesting refill(s) HCTZ  Last OV 2/20/24 (pertaining to medication)  LR 3/5/24 (per medication requested)  Next office visit scheduled or attempted Yes   If no, reason:  2/21/25

## 2024-08-27 NOTE — TELEPHONE ENCOUNTER
Attempted to reach pt, left vm with instructions on labs and informed they are ordered per pt HIPAA form.

## 2024-09-05 DIAGNOSIS — E78.00 PURE HYPERCHOLESTEROLEMIA: Primary | ICD-10-CM

## 2024-09-05 RX ORDER — EZETIMIBE 10 MG/1
10 TABLET ORAL DAILY
Qty: 90 TABLET | Refills: 1 | Status: SHIPPED | OUTPATIENT
Start: 2024-09-05

## 2024-09-20 LAB
ANION GAP SERPL CALCULATED.3IONS-SCNC: 10 MMOL/L (ref 5–13)
BUN / CREAT RATIO: 19
BUN BLDV-MCNC: 30 MG/DL (ref 7–25)
CALCIUM SERPL-MCNC: 9.4 MG/DL (ref 8.5–10.5)
CHLORIDE BLD-SCNC: 103 MMOL/L (ref 98–110)
CO2: 25 MMOL/L (ref 22–29)
CREAT SERPL-MCNC: 1.61 MG/DL (ref 0.5–1.2)
EGFR (CKD-EPI): 34 SEE NOTE
GLUCOSE BLD-MCNC: 279 MG/DL (ref 71–99)
POTASSIUM SERPL-SCNC: 4.8 MMOL/L (ref 3.5–5.1)
SODIUM BLD-SCNC: 138 MMOL/L (ref 135–146)

## 2024-10-04 ENCOUNTER — TELEPHONE (OUTPATIENT)
Dept: CARDIOLOGY CLINIC | Age: 69
End: 2024-10-04

## 2024-10-04 NOTE — TELEPHONE ENCOUNTER
Rose from [facility] pharmacy contacted office stating they have been unsuccessful in reaching pt to get set up w/ repatha. CB# [phone]

## 2024-10-16 NOTE — TELEPHONE ENCOUNTER
Attempted to reach pt to see if she could possibly quality for pt asst. Unable to reach. Left vm to call office back.

## 2024-10-16 NOTE — TELEPHONE ENCOUNTER
Pt called and stated that the Repatha was too expensive and would like to know if theres anything else that could be prescribed.

## 2024-11-01 ENCOUNTER — HOSPITAL ENCOUNTER (OUTPATIENT)
Dept: WOMENS IMAGING | Age: 69
Discharge: HOME | End: 2024-11-01
Payer: MEDICARE

## 2024-11-01 VITALS — HEIGHT: 62 IN | WEIGHT: 192 LBS | BODY MASS INDEX: 35.33 KG/M2

## 2024-11-01 DIAGNOSIS — Z12.31 ENCOUNTER FOR SCREENING MAMMOGRAM FOR MALIGNANT NEOPLASM OF BREAST: ICD-10-CM

## 2024-11-01 PROCEDURE — 77063 BREAST TOMOSYNTHESIS BI: CPT

## 2025-02-21 ENCOUNTER — OFFICE VISIT (OUTPATIENT)
Dept: FAMILY MEDICINE CLINIC | Age: 70
End: 2025-02-21
Payer: MEDICARE

## 2025-02-21 ENCOUNTER — TELEPHONE (OUTPATIENT)
Dept: FAMILY MEDICINE CLINIC | Age: 70
End: 2025-02-21

## 2025-02-21 VITALS
DIASTOLIC BLOOD PRESSURE: 72 MMHG | WEIGHT: 205.8 LBS | HEART RATE: 65 BPM | BODY MASS INDEX: 37.87 KG/M2 | SYSTOLIC BLOOD PRESSURE: 124 MMHG | OXYGEN SATURATION: 99 % | HEIGHT: 62 IN

## 2025-02-21 DIAGNOSIS — E03.9 HYPOTHYROIDISM, UNSPECIFIED TYPE: ICD-10-CM

## 2025-02-21 DIAGNOSIS — N18.32 STAGE 3B CHRONIC KIDNEY DISEASE (HCC): ICD-10-CM

## 2025-02-21 DIAGNOSIS — E66.01 SEVERE OBESITY (BMI 35.0-39.9) WITH COMORBIDITY: ICD-10-CM

## 2025-02-21 DIAGNOSIS — E11.21 TYPE 2 DIABETES MELLITUS WITH DIABETIC NEPHROPATHY, WITH LONG-TERM CURRENT USE OF INSULIN (HCC): ICD-10-CM

## 2025-02-21 DIAGNOSIS — I25.10 CORONARY ARTERY DISEASE INVOLVING NATIVE CORONARY ARTERY OF NATIVE HEART WITHOUT ANGINA PECTORIS: ICD-10-CM

## 2025-02-21 DIAGNOSIS — E78.2 MIXED HYPERLIPIDEMIA: ICD-10-CM

## 2025-02-21 DIAGNOSIS — K75.81 NASH (NONALCOHOLIC STEATOHEPATITIS): ICD-10-CM

## 2025-02-21 DIAGNOSIS — I10 ESSENTIAL HYPERTENSION: Primary | ICD-10-CM

## 2025-02-21 DIAGNOSIS — Z79.4 TYPE 2 DIABETES MELLITUS WITH DIABETIC NEPHROPATHY, WITH LONG-TERM CURRENT USE OF INSULIN (HCC): ICD-10-CM

## 2025-02-21 PROCEDURE — 3078F DIAST BP <80 MM HG: CPT | Performed by: FAMILY MEDICINE

## 2025-02-21 PROCEDURE — 1159F MED LIST DOCD IN RCRD: CPT | Performed by: FAMILY MEDICINE

## 2025-02-21 PROCEDURE — 1123F ACP DISCUSS/DSCN MKR DOCD: CPT | Performed by: FAMILY MEDICINE

## 2025-02-21 PROCEDURE — 1160F RVW MEDS BY RX/DR IN RCRD: CPT | Performed by: FAMILY MEDICINE

## 2025-02-21 PROCEDURE — 3074F SYST BP LT 130 MM HG: CPT | Performed by: FAMILY MEDICINE

## 2025-02-21 PROCEDURE — G2211 COMPLEX E/M VISIT ADD ON: HCPCS | Performed by: FAMILY MEDICINE

## 2025-02-21 PROCEDURE — 99214 OFFICE O/P EST MOD 30 MIN: CPT | Performed by: FAMILY MEDICINE

## 2025-02-21 RX ORDER — AMLODIPINE BESYLATE 10 MG/1
10 TABLET ORAL DAILY
COMMUNITY

## 2025-02-21 SDOH — ECONOMIC STABILITY: FOOD INSECURITY: WITHIN THE PAST 12 MONTHS, THE FOOD YOU BOUGHT JUST DIDN'T LAST AND YOU DIDN'T HAVE MONEY TO GET MORE.: NEVER TRUE

## 2025-02-21 SDOH — ECONOMIC STABILITY: FOOD INSECURITY: WITHIN THE PAST 12 MONTHS, YOU WORRIED THAT YOUR FOOD WOULD RUN OUT BEFORE YOU GOT MONEY TO BUY MORE.: NEVER TRUE

## 2025-02-21 ASSESSMENT — PATIENT HEALTH QUESTIONNAIRE - PHQ9
SUM OF ALL RESPONSES TO PHQ9 QUESTIONS 1 & 2: 0
3. TROUBLE FALLING OR STAYING ASLEEP: NOT AT ALL
SUM OF ALL RESPONSES TO PHQ QUESTIONS 1-9: 0
10. IF YOU CHECKED OFF ANY PROBLEMS, HOW DIFFICULT HAVE THESE PROBLEMS MADE IT FOR YOU TO DO YOUR WORK, TAKE CARE OF THINGS AT HOME, OR GET ALONG WITH OTHER PEOPLE: NOT DIFFICULT AT ALL
9. THOUGHTS THAT YOU WOULD BE BETTER OFF DEAD, OR OF HURTING YOURSELF: NOT AT ALL
2. FEELING DOWN, DEPRESSED OR HOPELESS: NOT AT ALL
6. FEELING BAD ABOUT YOURSELF - OR THAT YOU ARE A FAILURE OR HAVE LET YOURSELF OR YOUR FAMILY DOWN: NOT AT ALL
1. LITTLE INTEREST OR PLEASURE IN DOING THINGS: NOT AT ALL
SUM OF ALL RESPONSES TO PHQ QUESTIONS 1-9: 0
7. TROUBLE CONCENTRATING ON THINGS, SUCH AS READING THE NEWSPAPER OR WATCHING TELEVISION: NOT AT ALL
4. FEELING TIRED OR HAVING LITTLE ENERGY: NOT AT ALL
5. POOR APPETITE OR OVEREATING: NOT AT ALL
SUM OF ALL RESPONSES TO PHQ QUESTIONS 1-9: 0
8. MOVING OR SPEAKING SO SLOWLY THAT OTHER PEOPLE COULD HAVE NOTICED. OR THE OPPOSITE, BEING SO FIGETY OR RESTLESS THAT YOU HAVE BEEN MOVING AROUND A LOT MORE THAN USUAL: NOT AT ALL
SUM OF ALL RESPONSES TO PHQ QUESTIONS 1-9: 0

## 2025-02-21 NOTE — TELEPHONE ENCOUNTER
Patient stated that her handicap placard expires this month and needs a new letter for the DMV asked that it be faxed to her at [phone]. She had forgot to mention it during her visit.

## 2025-02-21 NOTE — PROGRESS NOTES
Clemencia Perez presents for   Chief Complaint   Patient presents with    Hypertension     Pt states that she is here for a 6 month f/u, is fasting for bw but had some bw done for Dr. Maura Grant on 1/21/25    Hyperlipidemia        ASSESSMENT:   Diagnosis Orders   1. Essential hypertension, controlled, continue valsartan 80 mg twice daily, amlodipine 10 mg daily, carvedilol 25 mg twice daily       2. Mixed hyperlipidemia, uncontrolled, patient cannot tolerate statins, Repatha too expensive.  She is on Tricor and Zetia 10 mg daily, follow-up with       3. Coronary artery disease involving native coronary artery of native heart without angina pectoris, asymptomatic, continue cardiology follow       4. Type 2 diabetes mellitus with diabetic nephropathy, with long-term current use of insulin (HCC), improved, continue endocrinology follow-up continue Tresiba, Farxiga 10 mg and metformin 500 mg twice a day       5. Stage 3b chronic kidney disease (HCC), persisting, continue nephrology follow-up       6. Severe obesity (BMI 35.0-39.9) with comorbidity, unchanged, continue lifestyle efforts, GLP-1 too expensive       7. RASHEED (nonalcoholic steatohepatitis), uncertain control referred back to GI       8. Hypothyroidism, unspecified type, controlled, continue endocrinology follow            Plan:  1)  Medication: continue current medication regimen unchanged, medications are working and tolerated, continue as listed  2)  Recheck in 6 months, sooner should new symptoms or problems arise.  3) LLR          SUBJECTIVE:  Clemencia Perez is a 69 y.o. female who presents for evaluation of diabetes, CKD, hypothyroidism, RASHEED, obesity, hypertension and hyperlipidemia. She indicates that she is feeling well and denies any symptoms referable to her elevated blood pressure.   Specifically denies chest pain, palpitations, dyspnea, orthopnea, PND or peripheral edema or neuro sx.  No anorexia, arthralgia, or leg cramps noted. Current  76

## 2025-04-18 DIAGNOSIS — I10 PRIMARY HYPERTENSION: ICD-10-CM

## 2025-04-18 DIAGNOSIS — E78.2 MIXED HYPERLIPIDEMIA: ICD-10-CM

## 2025-04-18 DIAGNOSIS — E78.00 PURE HYPERCHOLESTEROLEMIA: ICD-10-CM

## 2025-04-18 DIAGNOSIS — I25.10 CORONARY ARTERY DISEASE INVOLVING NATIVE CORONARY ARTERY OF NATIVE HEART WITHOUT ANGINA PECTORIS: ICD-10-CM

## 2025-04-18 LAB
ANION GAP SERPL CALCULATED.3IONS-SCNC: 10 MMOL/L (ref 3–16)
ANION GAP SERPL CALCULATED.3IONS-SCNC: 10 MMOL/L (ref 3–16)
BUN SERPL-MCNC: 24 MG/DL (ref 7–20)
BUN SERPL-MCNC: 27 MG/DL (ref 7–20)
CALCIUM SERPL-MCNC: 9.6 MG/DL (ref 8.3–10.6)
CALCIUM SERPL-MCNC: 9.6 MG/DL (ref 8.3–10.6)
CHLORIDE SERPL-SCNC: 102 MMOL/L (ref 99–110)
CHLORIDE SERPL-SCNC: 102 MMOL/L (ref 99–110)
CHOLEST SERPL-MCNC: 229 MG/DL (ref 0–199)
CO2 SERPL-SCNC: 28 MMOL/L (ref 21–32)
CO2 SERPL-SCNC: 28 MMOL/L (ref 21–32)
CREAT SERPL-MCNC: 1.2 MG/DL (ref 0.6–1.2)
CREAT SERPL-MCNC: 1.2 MG/DL (ref 0.6–1.2)
DEPRECATED RDW RBC AUTO: 14.1 % (ref 12.4–15.4)
GFR SERPLBLD CREATININE-BSD FMLA CKD-EPI: 49 ML/MIN/{1.73_M2}
GFR SERPLBLD CREATININE-BSD FMLA CKD-EPI: 49 ML/MIN/{1.73_M2}
GLUCOSE SERPL-MCNC: 158 MG/DL (ref 70–99)
GLUCOSE SERPL-MCNC: 158 MG/DL (ref 70–99)
HCT VFR BLD AUTO: 39.7 % (ref 36–48)
HDLC SERPL-MCNC: 42 MG/DL (ref 40–60)
HGB BLD-MCNC: 12.8 G/DL (ref 12–16)
LDLC SERPL CALC-MCNC: 146 MG/DL
MCH RBC QN AUTO: 28.3 PG (ref 26–34)
MCHC RBC AUTO-ENTMCNC: 32.3 G/DL (ref 31–36)
MCV RBC AUTO: 87.4 FL (ref 80–100)
PLATELET # BLD AUTO: 218 K/UL (ref 135–450)
PMV BLD AUTO: 9.4 FL (ref 5–10.5)
POTASSIUM SERPL-SCNC: 5.3 MMOL/L (ref 3.5–5.1)
POTASSIUM SERPL-SCNC: 5.3 MMOL/L (ref 3.5–5.1)
RBC # BLD AUTO: 4.55 M/UL (ref 4–5.2)
SODIUM SERPL-SCNC: 140 MMOL/L (ref 136–145)
SODIUM SERPL-SCNC: 140 MMOL/L (ref 136–145)
TRIGL SERPL-MCNC: 204 MG/DL (ref 0–150)
VLDLC SERPL CALC-MCNC: 41 MG/DL
WBC # BLD AUTO: 6 K/UL (ref 4–11)

## 2025-04-21 ENCOUNTER — RESULTS FOLLOW-UP (OUTPATIENT)
Dept: CARDIOLOGY CLINIC | Age: 70
End: 2025-04-21

## 2025-04-21 ENCOUNTER — RESULTS FOLLOW-UP (OUTPATIENT)
Dept: CARDIOLOGY | Age: 70
End: 2025-04-21

## 2025-04-21 DIAGNOSIS — Z79.899 MEDICATION MANAGEMENT: Primary | ICD-10-CM

## 2025-04-21 NOTE — TELEPHONE ENCOUNTER
Dr. Sands this is a [facility] [place] last seen 8/2024. COuld you review labs? We started patient on Zetia and ask that they repeat labs in 3 months. Statin intolerance due to elevated liver enzymes. unable to afford Repatha   LDL is now 146, it was 166 prior to starting Zetia.     Assessment:   Coronary artery disease- CT calcium score 2022, 297. stable  Hypertension- elevated   Hyperlipidemia- much improved on Repatha   Diabetes mellitus - followed by PCP   Chronic renal insufficiency   Second hand smoke exposure - discussed avoidance   Family history of heart diease   Statin intolerance due to elevated liver enzymes      Plan:  ~Increase Valsartan to 80 mg twice a day   ~Labs when you see Zofia Medeiros MD- Fasting lipids and CMP     Cardiac medications reviewed including indications and pertinent side effects. Medication list updated at this visit.   Patient verbalizes understanding of the need for treatment and education has been provided at today's visit. Additional education material will be provided in after visit summary.    Check blood pressure and heart rate at home a few times per week- keep a log with dates and times and bring to office visit               ~Blood pressure goal is 120-130/70-80's, rarely over 140/90  Regular exercise and following a healthy diet encouraged   Follow up with me in 1 year        Latest Reference Range & Units 04/18/25 10:24   Sodium 136 - 145 mmol/L  136 - 145 mmol/L 140  140   Potassium 3.5 - 5.1 mmol/L  3.5 - 5.1 mmol/L 5.3 (H)  5.3 (H)   Chloride 99 - 110 mmol/L  99 - 110 mmol/L 102  102   CARBON DIOXIDE 21 - 32 mmol/L  21 - 32 mmol/L 28  28   BUN,BUNPL 7 - 20 mg/dL  7 - 20 mg/dL 24 (H)  27 (H)   Creatinine 0.6 - 1.2 mg/dL  0.6 - 1.2 mg/dL 1.2  1.2   Anion Gap 3 - 16   3 - 16  10  10   Est, Glom Filt Rate >60   >60  49 !  49 !   Glucose 70 - 99 mg/dL  70 - 99 mg/dL 158 (H)  158 (H)   Calcium 8.3 - 10.6 mg/dL  8.3 - 10.6 mg/dL 9.6  9.6   Cholesterol, Total 0 - 199 mg/dL 229

## 2025-05-05 ENCOUNTER — TELEPHONE (OUTPATIENT)
Dept: FAMILY MEDICINE CLINIC | Age: 70
End: 2025-05-05

## 2025-06-03 ENCOUNTER — TELEMEDICINE (OUTPATIENT)
Dept: FAMILY MEDICINE CLINIC | Age: 70
End: 2025-06-03
Payer: MEDICARE

## 2025-06-03 DIAGNOSIS — Z00.00 MEDICARE ANNUAL WELLNESS VISIT, SUBSEQUENT: Primary | ICD-10-CM

## 2025-06-03 PROCEDURE — 1159F MED LIST DOCD IN RCRD: CPT | Performed by: FAMILY MEDICINE

## 2025-06-03 PROCEDURE — 1123F ACP DISCUSS/DSCN MKR DOCD: CPT | Performed by: FAMILY MEDICINE

## 2025-06-03 PROCEDURE — G0439 PPPS, SUBSEQ VISIT: HCPCS | Performed by: FAMILY MEDICINE

## 2025-06-03 ASSESSMENT — PATIENT HEALTH QUESTIONNAIRE - PHQ9
2. FEELING DOWN, DEPRESSED OR HOPELESS: NOT AT ALL
SUM OF ALL RESPONSES TO PHQ QUESTIONS 1-9: 0
1. LITTLE INTEREST OR PLEASURE IN DOING THINGS: NOT AT ALL
SUM OF ALL RESPONSES TO PHQ QUESTIONS 1-9: 0

## 2025-06-03 ASSESSMENT — LIFESTYLE VARIABLES
HOW OFTEN DO YOU HAVE A DRINK CONTAINING ALCOHOL: 2-4 TIMES A MONTH
HOW MANY STANDARD DRINKS CONTAINING ALCOHOL DO YOU HAVE ON A TYPICAL DAY: 1 OR 2

## 2025-06-03 NOTE — PROGRESS NOTES
Medicare Annual Wellness Visit    Clemencia Perez is here for Medicare AWV    Assessment & Plan   Medicare annual wellness visit, subsequent     No follow-ups on file.     Subjective       Patient's complete Health Risk Assessment and screening values have been reviewed and are found in Flowsheets. The following problems were reviewed today and where indicated follow up appointments were made and/or referrals ordered.    Positive Risk Factor Screenings with Interventions:              Inactivity:  On average, how many days per week do you engage in moderate to strenuous exercise (like a brisk walk)?: 2 days (!) Abnormal  On average, how many minutes do you engage in exercise at this level?: 30 min  Interventions:  See AVS for additional education material     Abnormal BMI (obese):  There is no height or weight on file to calculate BMI. (!) Abnormal  Interventions:  See AVS for additional education material        Dentist Screen:  Have you seen the dentist within the past year?: (!) No    Intervention:  Advised to schedule with their dentist     Vision Screen:  Do you have difficulty driving, watching TV, or doing any of your daily activities because of your eyesight?: No  Have you had an eye exam within the past year?: (!) No  Interventions:   Patient encouraged to make appointment with their eye specialist    Safety:  Do you have working smoke detectors?: (!) No  Interventions:  See AVS for additional education material     Advanced Directives:  Do you have a Living Will?: (!) No    Intervention:  has NO advanced directive - information provided                     Objective    Patient-Reported Vitals  Patient-Reported Weight: 192lb  Patient-Reported Height: 5' 2\"     Patient did not monitor blood pressure at home            Allergies   Allergen Reactions    Lisinopril     Proventil [Albuterol Sulfate] Other (See Comments)     Paralyzed vocal cords    Statins      Elevated liver enzymes    Tetanus Toxoids

## 2025-07-01 LAB
ESTIMATED AVERAGE GLUCOSE: NORMAL
HBA1C MFR BLD: 7.5 %

## 2025-08-22 ENCOUNTER — OFFICE VISIT (OUTPATIENT)
Dept: FAMILY MEDICINE CLINIC | Age: 70
End: 2025-08-22
Payer: MEDICARE

## 2025-08-22 VITALS
HEART RATE: 80 BPM | DIASTOLIC BLOOD PRESSURE: 66 MMHG | OXYGEN SATURATION: 99 % | SYSTOLIC BLOOD PRESSURE: 138 MMHG | HEIGHT: 62 IN | BODY MASS INDEX: 36.33 KG/M2 | WEIGHT: 197.4 LBS

## 2025-08-22 DIAGNOSIS — E78.2 MIXED HYPERLIPIDEMIA: ICD-10-CM

## 2025-08-22 DIAGNOSIS — Z79.4 TYPE 2 DIABETES MELLITUS WITH DIABETIC NEPHROPATHY, WITH LONG-TERM CURRENT USE OF INSULIN (HCC): ICD-10-CM

## 2025-08-22 DIAGNOSIS — Z12.11 SCREEN FOR COLON CANCER: ICD-10-CM

## 2025-08-22 DIAGNOSIS — E66.01 SEVERE OBESITY (BMI 35.0-39.9) WITH COMORBIDITY (HCC): ICD-10-CM

## 2025-08-22 DIAGNOSIS — E11.21 TYPE 2 DIABETES MELLITUS WITH DIABETIC NEPHROPATHY, WITH LONG-TERM CURRENT USE OF INSULIN (HCC): ICD-10-CM

## 2025-08-22 DIAGNOSIS — E03.9 HYPOTHYROIDISM, UNSPECIFIED TYPE: ICD-10-CM

## 2025-08-22 DIAGNOSIS — I25.10 CORONARY ARTERY DISEASE INVOLVING NATIVE CORONARY ARTERY OF NATIVE HEART WITHOUT ANGINA PECTORIS: ICD-10-CM

## 2025-08-22 DIAGNOSIS — N18.32 STAGE 3B CHRONIC KIDNEY DISEASE (HCC): ICD-10-CM

## 2025-08-22 DIAGNOSIS — I10 ESSENTIAL HYPERTENSION: Primary | ICD-10-CM

## 2025-08-22 PROCEDURE — G2211 COMPLEX E/M VISIT ADD ON: HCPCS | Performed by: FAMILY MEDICINE

## 2025-08-22 PROCEDURE — 3075F SYST BP GE 130 - 139MM HG: CPT | Performed by: FAMILY MEDICINE

## 2025-08-22 PROCEDURE — 1159F MED LIST DOCD IN RCRD: CPT | Performed by: FAMILY MEDICINE

## 2025-08-22 PROCEDURE — 1123F ACP DISCUSS/DSCN MKR DOCD: CPT | Performed by: FAMILY MEDICINE

## 2025-08-22 PROCEDURE — 3051F HG A1C>EQUAL 7.0%<8.0%: CPT | Performed by: FAMILY MEDICINE

## 2025-08-22 PROCEDURE — 3078F DIAST BP <80 MM HG: CPT | Performed by: FAMILY MEDICINE

## 2025-08-22 PROCEDURE — 99214 OFFICE O/P EST MOD 30 MIN: CPT | Performed by: FAMILY MEDICINE

## 2025-08-22 RX ORDER — SEMAGLUTIDE 0.68 MG/ML
0.25 INJECTION, SOLUTION SUBCUTANEOUS
COMMUNITY

## 2025-09-04 DIAGNOSIS — I10 PRIMARY HYPERTENSION: ICD-10-CM

## 2025-09-04 LAB — NONINV COLON CA DNA+OCC BLD SCRN STL QL: NEGATIVE

## 2025-09-04 RX ORDER — CARVEDILOL 25 MG/1
25 TABLET ORAL 2 TIMES DAILY
Qty: 60 TABLET | Refills: 0 | Status: SHIPPED | OUTPATIENT
Start: 2025-09-04

## 2025-09-04 RX ORDER — VALSARTAN 80 MG/1
80 TABLET ORAL 2 TIMES DAILY
Qty: 60 TABLET | Refills: 0 | Status: SHIPPED | OUTPATIENT
Start: 2025-09-04